# Patient Record
Sex: FEMALE | Race: WHITE | NOT HISPANIC OR LATINO | Employment: OTHER | URBAN - METROPOLITAN AREA
[De-identification: names, ages, dates, MRNs, and addresses within clinical notes are randomized per-mention and may not be internally consistent; named-entity substitution may affect disease eponyms.]

---

## 2017-03-29 DIAGNOSIS — E78.2 MIXED HYPERLIPIDEMIA: Primary | ICD-10-CM

## 2017-03-29 RX ORDER — ATORVASTATIN CALCIUM 20 MG/1
20 TABLET, FILM COATED ORAL DAILY
Qty: 90 TABLET | Refills: 0 | Status: SHIPPED | OUTPATIENT
Start: 2017-03-29 | End: 2017-08-04

## 2017-05-19 DIAGNOSIS — I25.10 CAD (CORONARY ARTERY DISEASE): Primary | ICD-10-CM

## 2017-05-19 NOTE — TELEPHONE ENCOUNTER
Per 5/25/16 chart note:  IMPRESSION AND PLAN:   1. Palpitations. She was experiencing palpitations likely related to PVCs as identified on ZIO Patch monitor; however, at this point, she is no longer experiencing any symptoms whatsoever. I suggested she continue with her metoprolol XL at 50 mg daily.   2. Very mild nonobstructive coronary disease as identified on recent angiogram. She has multiple risk factors including a history of dyslipidemia, hypertension, family history of heart disease, obesity, diabetes and a previous history of tobacco abuse. I suggested she continue her aspirin, beta blocker and statin therapy.   3. Hypertension. Blood pressure is adequately controlled.   4. Dyslipidemia, as noted above.       Ms. Gomez is going to visit with us in the future again as needed. She is aware if she has questions or concerns, she can contact our office.

## 2017-08-04 DIAGNOSIS — E78.2 MIXED HYPERLIPIDEMIA: ICD-10-CM

## 2017-08-04 RX ORDER — ATORVASTATIN CALCIUM 20 MG/1
20 TABLET, FILM COATED ORAL DAILY
Qty: 90 TABLET | Refills: 0 | Status: ON HOLD | OUTPATIENT
Start: 2017-08-04 | End: 2020-07-25

## 2017-11-01 DIAGNOSIS — E78.2 MIXED HYPERLIPIDEMIA: ICD-10-CM

## 2017-11-01 RX ORDER — ATORVASTATIN CALCIUM 20 MG/1
20 TABLET, FILM COATED ORAL DAILY
Qty: 90 TABLET | Refills: 0 | OUTPATIENT
Start: 2017-11-01

## 2019-08-29 ENCOUNTER — HOSPITAL ENCOUNTER (EMERGENCY)
Facility: CLINIC | Age: 54
Discharge: HOME | End: 2019-08-29
Attending: EMERGENCY MEDICINE | Admitting: EMERGENCY MEDICINE
Payer: COMMERCIAL

## 2019-08-29 VITALS
RESPIRATION RATE: 16 BRPM | DIASTOLIC BLOOD PRESSURE: 73 MMHG | SYSTOLIC BLOOD PRESSURE: 133 MMHG | OXYGEN SATURATION: 95 % | WEIGHT: 230 LBS | BODY MASS INDEX: 40.75 KG/M2 | TEMPERATURE: 98.4 F | HEIGHT: 63 IN

## 2019-08-29 DIAGNOSIS — K04.7 DENTAL ABSCESS: ICD-10-CM

## 2019-08-29 PROCEDURE — 99283 EMERGENCY DEPT VISIT LOW MDM: CPT | Mod: 25

## 2019-08-29 PROCEDURE — 10021 FNA BX W/O IMG GDN 1ST LES: CPT

## 2019-08-29 RX ORDER — CLINDAMYCIN HCL 300 MG
300 CAPSULE ORAL 3 TIMES DAILY
Qty: 30 CAPSULE | Refills: 0 | Status: SHIPPED | OUTPATIENT
Start: 2019-08-29 | End: 2019-08-29

## 2019-08-29 RX ORDER — CHLORHEXIDINE GLUCONATE ORAL RINSE 1.2 MG/ML
15 SOLUTION DENTAL 2 TIMES DAILY
Qty: 300 ML | Refills: 0 | Status: SHIPPED | OUTPATIENT
Start: 2019-08-29 | End: 2019-08-29

## 2019-08-29 RX ORDER — CLINDAMYCIN HCL 300 MG
300 CAPSULE ORAL 3 TIMES DAILY
Qty: 30 CAPSULE | Refills: 0 | Status: ON HOLD | OUTPATIENT
Start: 2019-08-29 | End: 2019-09-01

## 2019-08-29 RX ORDER — CHLORHEXIDINE GLUCONATE ORAL RINSE 1.2 MG/ML
15 SOLUTION DENTAL 2 TIMES DAILY
Qty: 300 ML | Refills: 0 | Status: ON HOLD | OUTPATIENT
Start: 2019-08-29 | End: 2020-07-25

## 2019-08-29 ASSESSMENT — ENCOUNTER SYMPTOMS
FEVER: 0
FACIAL SWELLING: 1
SHORTNESS OF BREATH: 0

## 2019-08-29 ASSESSMENT — MIFFLIN-ST. JEOR: SCORE: 1599.46

## 2019-08-29 NOTE — ED PROVIDER NOTES
History     Chief Complaint:  Facial swelling    HPI   Radha Gomez is a 54 year old female who presents with facial swelling. The patient noticed facial and neck swelling 3 days ago that has progressively worsened since. Today, she went to the dentist who noticed an abscess that required drainage. The patient states the abscess was not drained as the dentist was concerned the patients breathing would stop. She was referred to the ER. Here, the patient denies any shortness of breath or fever.     Allergies:  Abilify   Paxil   Contrast dye   Ditropan   Wellbutrin      Medications:    Xanax   Aspirin   Lipitor   Rexulti   Norco   Ibuprofen   Meclizine   Glucophage   Methadone   Toprol   Aygestin   Omeprazole   Pyridium   Seroquel   Senokot   Zoloft   Tizanidine   Effexor     Past Medical History:    Anxiety   Arthritis   Back injury   Bipolar 1 disorder   Borderline personality disorder   Chest pain   Coronary artery disease   Depression   Hyperlipidemia   Hypertension   IC   Osteoarthritis   Palpitations   Post traumatic stress disorder     Past Surgical History:    Abdomen surgery   Bladder biopsy  Colonoscopy   Esophagoscopy, gastroscopy, and duodenoscopy   GYN surgery   Left heart cath   Cholecystectomy   Laparoscopy   Orthopedic surgery    Family History:    Depression   Substance abuse   Dementia   mental illness   Coronary artery disease   Diabetes mellitus   Heart failure   Suicide   Bipolar disorder     Social History:  Marital Status:     Smoker:   Former, quit 2012   Smokeless:   Never   Alcohol:   No   Drugs:   No   Arrives with:        Review of Systems   Constitutional: Negative for fever.   HENT: Positive for facial swelling.    Respiratory: Negative for shortness of breath.    All other systems reviewed and are negative.    Physical Exam     Patient Vitals for the past 24 hrs:   BP Temp Temp src Heart Rate Resp SpO2 Height Weight   08/29/19 1023 133/73 98.4  F (36.9  C) Oral 76 16  "95 % 1.588 m (5' 2.5\") 104.3 kg (230 lb)     Physical Exam  Constitutional: The patient is oriented to person, place, and time.   HENT:   Head: Atraumatic  Right Ear: Normal  Left Ear: Normal  Nose: Nose normal.   Mouth/Throat: Oropharynx is clear and moist. No exudate. Swelling along the right side of the jaw. Gingival mucosa along the first premolar; erythematous and slightly swollen. No active drainage. No swelling in the floor of the mouth  Neck: Normal range of motion. Neck supple. Tender adenopathy on the right  Cardiovascular: Normal rate, regular rhythm, no murmur gallops or rubs. Intact distal pulses.    Pulmonary/Chest: CTA bilaterally. No wheezes rale or rhonchi. No stridor     Emergency Department Course     Procedures:      Aspiration     LOCATIONS:  Right side of jaw     ANESTHESIA:  Local field block using lidocaine, total of 1 mLs     PREPARATION:  Cleansed with Normal Saline and Shur Clens     PROCEDURE:   An 19-gauge needle was inserted into the area of flutuance.  Marked purulence, blood, and fluid was noted.  The wound was left open. Plan is for QID salt water rinses, follow up with dental/oral surgery.    PATIENT STATUS:        Patient tolerated the procedure well. There were no complications.    Emergency Department Course:  1022 I performed an exam of the patient as documented above.   1030 I anesthetized the patient's abscess.   1050 Abscess was drained.     Findings and plan explained. Patient discharged home with instructions regarding supportive care and reasons to return. The importance of close follow-up was reviewed. I personally answered all related questions prior to discharge.    Impression & Plan    Medical Decision Making:  Radha Gomez is a 54 years old female who presents with right sided facial swelling and tooth pain. She saw her dentist this morning who was concerned about a soft tissue abscess. On my evaluation, she has tenderness to palpation over the first premolar with " gingival inflammation and swelling. She also has darien cervical adenopathy. There are no signs of airway comprise or swelling. There is no swelling in the floor of the mouth. I did perform and aspiration at the base of the affected tooth and got a very small amount of purulent material but mostly blood. At this point, I feel she can be safely discharged to home. I placed her on clindamycin and Peridex oral rinses. She should follow up with her dentist next week for more definitive treatment and return for problems.     Diagnosis:    ICD-10-CM    1. Dental abscess K04.7      Disposition:  discharged to home with Clindamycin and Peridex.     Scribe Disclosure:  I, Angelito Mooney, am serving as a scribe on 8/29/2019 at 10:22 AM to personally document services performed by J Carlos Lane MD based on my observations and the provider's statements to me.     Angelito Mooney  8/29/2019    EMERGENCY DEPARTMENT       J Carlos Lane MD  08/29/19 [id number]

## 2019-08-29 NOTE — ED TRIAGE NOTES
Sent from dentist for eval of facial and neck swellings starting on Monday. Pt speaking in full sentences. Has pain, taking advil which helps

## 2019-08-29 NOTE — ED AVS SNAPSHOT
Emergency Department  [address] [facility] [address]  Phone:  [phone]  Fax:  [phone]                                    Radha Gomez   MRN: [id number]    Department:   Emergency Department   Date of Visit:  8/29/2019           After Visit Summary Signature Page    I have received my discharge instructions, and my questions have been answered. I have discussed any challenges I see with this plan with the nurse or doctor.    ..........................................................................................................................................  Patient/Patient Representative Signature      ..........................................................................................................................................  Patient Representative Print Name and Relationship to Patient    ..................................................               ................................................  Date                                   Time    ..........................................................................................................................................  Reviewed by Signature/Title    ...................................................              ..............................................  Date                                               Time          22EPIC Rev 08/18

## 2019-08-30 ENCOUNTER — APPOINTMENT (OUTPATIENT)
Dept: CT IMAGING | Facility: CLINIC | Age: 54
DRG: 138 | End: 2019-08-30
Payer: COMMERCIAL

## 2019-08-30 ENCOUNTER — HOSPITAL ENCOUNTER (INPATIENT)
Facility: CLINIC | Age: 54
LOS: 2 days | Discharge: HOME | DRG: 138 | End: 2019-09-01
Attending: EMERGENCY MEDICINE | Admitting: HOSPITALIST
Payer: COMMERCIAL

## 2019-08-30 ENCOUNTER — ANESTHESIA (OUTPATIENT)
Dept: SURGERY | Facility: CLINIC | Age: 54
DRG: 138 | End: 2019-08-30
Payer: COMMERCIAL

## 2019-08-30 ENCOUNTER — ANESTHESIA EVENT (OUTPATIENT)
Dept: SURGERY | Facility: CLINIC | Age: 54
DRG: 138 | End: 2019-08-30
Payer: COMMERCIAL

## 2019-08-30 DIAGNOSIS — Z78.9 FAILURE OF OUTPATIENT TREATMENT: ICD-10-CM

## 2019-08-30 DIAGNOSIS — K04.7 DENTAL ABSCESS: ICD-10-CM

## 2019-08-30 DIAGNOSIS — K04.7 TOOTH ABSCESS: Primary | ICD-10-CM

## 2019-08-30 DIAGNOSIS — K04.7 PERIAPICAL ABSCESS: ICD-10-CM

## 2019-08-30 DIAGNOSIS — L03.211 FACIAL CELLULITIS: ICD-10-CM

## 2019-08-30 LAB
ALBUMIN SERPL-MCNC: 3.2 G/DL (ref 3.4–5)
ALP SERPL-CCNC: 106 U/L (ref 40–150)
ALT SERPL W P-5'-P-CCNC: 24 U/L (ref 0–50)
ANION GAP SERPL CALCULATED.3IONS-SCNC: 6 MMOL/L (ref 3–14)
AST SERPL W P-5'-P-CCNC: 16 U/L (ref 0–45)
BASOPHILS # BLD AUTO: 0 10E9/L (ref 0–0.2)
BASOPHILS NFR BLD AUTO: 0.2 %
BILIRUB SERPL-MCNC: 0.2 MG/DL (ref 0.2–1.3)
BUN SERPL-MCNC: 17 MG/DL (ref 7–30)
CALCIUM SERPL-MCNC: 8.5 MG/DL (ref 8.5–10.1)
CHLORIDE SERPL-SCNC: 111 MMOL/L (ref 94–109)
CO2 SERPL-SCNC: 25 MMOL/L (ref 20–32)
CREAT SERPL-MCNC: 1.12 MG/DL (ref 0.52–1.04)
DIFFERENTIAL METHOD BLD: ABNORMAL
EOSINOPHIL # BLD AUTO: 0.3 10E9/L (ref 0–0.7)
EOSINOPHIL NFR BLD AUTO: 3.2 %
ERYTHROCYTE [DISTWIDTH] IN BLOOD BY AUTOMATED COUNT: 14 % (ref 10–15)
GFR SERPL CREATININE-BSD FRML MDRD: 55 ML/MIN/{1.73_M2}
GLUCOSE BLDC GLUCOMTR-MCNC: 103 MG/DL (ref 70–99)
GLUCOSE SERPL-MCNC: 123 MG/DL (ref 70–99)
GRAM STN SPEC: ABNORMAL
GRAM STN SPEC: ABNORMAL
HCT VFR BLD AUTO: 32.8 % (ref 35–47)
HGB BLD-MCNC: 11 G/DL (ref 11.7–15.7)
IMM GRANULOCYTES # BLD: 0 10E9/L (ref 0–0.4)
IMM GRANULOCYTES NFR BLD: 0.2 %
INR PPP: 0.91 (ref 0.86–1.14)
LYMPHOCYTES # BLD AUTO: 2.2 10E9/L (ref 0.8–5.3)
LYMPHOCYTES NFR BLD AUTO: 26.3 %
Lab: ABNORMAL
MCH RBC QN AUTO: 29.5 PG (ref 26.5–33)
MCHC RBC AUTO-ENTMCNC: 33.5 G/DL (ref 31.5–36.5)
MCV RBC AUTO: 88 FL (ref 78–100)
MONOCYTES # BLD AUTO: 1 10E9/L (ref 0–1.3)
MONOCYTES NFR BLD AUTO: 11.8 %
NEUTROPHILS # BLD AUTO: 4.8 10E9/L (ref 1.6–8.3)
NEUTROPHILS NFR BLD AUTO: 58.3 %
NRBC # BLD AUTO: 0 10*3/UL
NRBC BLD AUTO-RTO: 0 /100
PLATELET # BLD AUTO: 256 10E9/L (ref 150–450)
POTASSIUM SERPL-SCNC: 4.5 MMOL/L (ref 3.4–5.3)
PROT SERPL-MCNC: 6.9 G/DL (ref 6.8–8.8)
RBC # BLD AUTO: 3.73 10E12/L (ref 3.8–5.2)
SODIUM SERPL-SCNC: 142 MMOL/L (ref 133–144)
SPECIMEN SOURCE: ABNORMAL
WBC # BLD AUTO: 8.2 10E9/L (ref 4–11)

## 2019-08-30 PROCEDURE — 36000058 ZZH SURGERY LEVEL 3 EA 15 ADDTL MIN: Performed by: DENTIST

## 2019-08-30 PROCEDURE — 25000128 H RX IP 250 OP 636: Performed by: NURSE ANESTHETIST, CERTIFIED REGISTERED

## 2019-08-30 PROCEDURE — 25000132 ZZH RX MED GY IP 250 OP 250 PS 637: Performed by: DENTIST

## 2019-08-30 PROCEDURE — 87106 FUNGI IDENTIFICATION YEAST: CPT | Performed by: DENTIST

## 2019-08-30 PROCEDURE — 87076 CULTURE ANAEROBE IDENT EACH: CPT | Performed by: DENTIST

## 2019-08-30 PROCEDURE — 85025 COMPLETE CBC W/AUTO DIFF WBC: CPT | Performed by: PHYSICIAN ASSISTANT

## 2019-08-30 PROCEDURE — 96365 THER/PROPH/DIAG IV INF INIT: CPT

## 2019-08-30 PROCEDURE — 70490 CT SOFT TISSUE NECK W/O DYE: CPT

## 2019-08-30 PROCEDURE — 0W930ZZ DRAINAGE OF ORAL CAVITY AND THROAT, OPEN APPROACH: ICD-10-PCS | Performed by: DENTIST

## 2019-08-30 PROCEDURE — 87070 CULTURE OTHR SPECIMN AEROBIC: CPT | Performed by: DENTIST

## 2019-08-30 PROCEDURE — 87075 CULTR BACTERIA EXCEPT BLOOD: CPT | Performed by: DENTIST

## 2019-08-30 PROCEDURE — 87205 SMEAR GRAM STAIN: CPT | Performed by: DENTIST

## 2019-08-30 PROCEDURE — 80053 COMPREHEN METABOLIC PANEL: CPT | Performed by: PHYSICIAN ASSISTANT

## 2019-08-30 PROCEDURE — 25000128 H RX IP 250 OP 636: Performed by: DENTIST

## 2019-08-30 PROCEDURE — 12000000 ZZH R&B MED SURG/OB

## 2019-08-30 PROCEDURE — 0CDXXZ1 EXTRACTION OF LOWER TOOTH, MULTIPLE, EXTERNAL APPROACH: ICD-10-PCS | Performed by: DENTIST

## 2019-08-30 PROCEDURE — 36000056 ZZH SURGERY LEVEL 3 1ST 30 MIN: Performed by: DENTIST

## 2019-08-30 PROCEDURE — 25000125 ZZHC RX 250: Performed by: DENTIST

## 2019-08-30 PROCEDURE — 25000566 ZZH SEVOFLURANE, EA 15 MIN: Performed by: DENTIST

## 2019-08-30 PROCEDURE — 87186 SC STD MICRODIL/AGAR DIL: CPT | Performed by: DENTIST

## 2019-08-30 PROCEDURE — 37000008 ZZH ANESTHESIA TECHNICAL FEE, 1ST 30 MIN: Performed by: DENTIST

## 2019-08-30 PROCEDURE — 87077 CULTURE AEROBIC IDENTIFY: CPT | Performed by: DENTIST

## 2019-08-30 PROCEDURE — 85610 PROTHROMBIN TIME: CPT | Performed by: PHYSICIAN ASSISTANT

## 2019-08-30 PROCEDURE — 25000128 H RX IP 250 OP 636: Performed by: PHYSICIAN ASSISTANT

## 2019-08-30 PROCEDURE — 40000169 ZZH STATISTIC PRE-PROCEDURE ASSESSMENT I: Performed by: DENTIST

## 2019-08-30 PROCEDURE — 25000128 H RX IP 250 OP 636: Performed by: ANESTHESIOLOGY

## 2019-08-30 PROCEDURE — 37000009 ZZH ANESTHESIA TECHNICAL FEE, EACH ADDTL 15 MIN: Performed by: DENTIST

## 2019-08-30 PROCEDURE — 71000012 ZZH RECOVERY PHASE 1 LEVEL 1 FIRST HR: Performed by: DENTIST

## 2019-08-30 PROCEDURE — 25800030 ZZH RX IP 258 OP 636: Performed by: NURSE ANESTHETIST, CERTIFIED REGISTERED

## 2019-08-30 PROCEDURE — 25000132 ZZH RX MED GY IP 250 OP 250 PS 637: Performed by: PHYSICIAN ASSISTANT

## 2019-08-30 PROCEDURE — 96366 THER/PROPH/DIAG IV INF ADDON: CPT

## 2019-08-30 PROCEDURE — 00000146 ZZHCL STATISTIC GLUCOSE BY METER IP

## 2019-08-30 PROCEDURE — 27210794 ZZH OR GENERAL SUPPLY STERILE: Performed by: DENTIST

## 2019-08-30 PROCEDURE — 99223 1ST HOSP IP/OBS HIGH 75: CPT | Mod: AI | Performed by: HOSPITALIST

## 2019-08-30 PROCEDURE — 99285 EMERGENCY DEPT VISIT HI MDM: CPT | Mod: 25

## 2019-08-30 PROCEDURE — 25000125 ZZHC RX 250: Performed by: NURSE ANESTHETIST, CERTIFIED REGISTERED

## 2019-08-30 PROCEDURE — 25800030 ZZH RX IP 258 OP 636: Performed by: DENTIST

## 2019-08-30 PROCEDURE — 71000013 ZZH RECOVERY PHASE 1 LEVEL 1 EA ADDTL HR: Performed by: DENTIST

## 2019-08-30 RX ORDER — LIDOCAINE HYDROCHLORIDE 20 MG/ML
INJECTION, SOLUTION INFILTRATION; PERINEURAL PRN
Status: DISCONTINUED | OUTPATIENT
Start: 2019-08-30 | End: 2019-08-30

## 2019-08-30 RX ORDER — POLYETHYLENE GLYCOL 3350 17 G/17G
17 POWDER, FOR SOLUTION ORAL DAILY PRN
Status: DISCONTINUED | OUTPATIENT
Start: 2019-08-30 | End: 2019-09-01 | Stop reason: HOSPADM

## 2019-08-30 RX ORDER — HYDROMORPHONE HYDROCHLORIDE 1 MG/ML
.3-.5 INJECTION, SOLUTION INTRAMUSCULAR; INTRAVENOUS; SUBCUTANEOUS EVERY 5 MIN PRN
Status: DISCONTINUED | OUTPATIENT
Start: 2019-08-30 | End: 2019-08-30 | Stop reason: HOSPADM

## 2019-08-30 RX ORDER — POTASSIUM CHLORIDE 29.8 MG/ML
20 INJECTION INTRAVENOUS
Status: DISCONTINUED | OUTPATIENT
Start: 2019-08-30 | End: 2019-09-01 | Stop reason: HOSPADM

## 2019-08-30 RX ORDER — SODIUM CHLORIDE, SODIUM LACTATE, POTASSIUM CHLORIDE, CALCIUM CHLORIDE 600; 310; 30; 20 MG/100ML; MG/100ML; MG/100ML; MG/100ML
INJECTION, SOLUTION INTRAVENOUS CONTINUOUS
Status: DISCONTINUED | OUTPATIENT
Start: 2019-08-30 | End: 2019-08-30 | Stop reason: HOSPADM

## 2019-08-30 RX ORDER — AMOXICILLIN 250 MG
2 CAPSULE ORAL 2 TIMES DAILY PRN
Status: DISCONTINUED | OUTPATIENT
Start: 2019-08-30 | End: 2019-09-01 | Stop reason: HOSPADM

## 2019-08-30 RX ORDER — AMPICILLIN AND SULBACTAM 2; 1 G/1; G/1
3 INJECTION, POWDER, FOR SOLUTION INTRAMUSCULAR; INTRAVENOUS ONCE
Status: COMPLETED | OUTPATIENT
Start: 2019-08-30 | End: 2019-08-30

## 2019-08-30 RX ORDER — LURASIDONE HYDROCHLORIDE 60 MG/1
60 TABLET, FILM COATED ORAL EVERY EVENING
Status: ON HOLD | COMMUNITY
End: 2020-07-29

## 2019-08-30 RX ORDER — CHLORHEXIDINE GLUCONATE ORAL RINSE 1.2 MG/ML
10 SOLUTION DENTAL ONCE
Status: COMPLETED | OUTPATIENT
Start: 2019-08-30 | End: 2019-08-30

## 2019-08-30 RX ORDER — ACETAMINOPHEN 650 MG/1
650 SUPPOSITORY RECTAL EVERY 4 HOURS PRN
Status: DISCONTINUED | OUTPATIENT
Start: 2019-08-30 | End: 2019-09-01 | Stop reason: HOSPADM

## 2019-08-30 RX ORDER — LABETALOL HYDROCHLORIDE 5 MG/ML
10 INJECTION, SOLUTION INTRAVENOUS
Status: DISCONTINUED | OUTPATIENT
Start: 2019-08-30 | End: 2019-09-01 | Stop reason: HOSPADM

## 2019-08-30 RX ORDER — PROPOFOL 10 MG/ML
INJECTION, EMULSION INTRAVENOUS PRN
Status: DISCONTINUED | OUTPATIENT
Start: 2019-08-30 | End: 2019-08-30

## 2019-08-30 RX ORDER — AMOXICILLIN 250 MG
1 CAPSULE ORAL 2 TIMES DAILY PRN
Status: DISCONTINUED | OUTPATIENT
Start: 2019-08-30 | End: 2019-09-01 | Stop reason: HOSPADM

## 2019-08-30 RX ORDER — SODIUM CHLORIDE, SODIUM LACTATE, POTASSIUM CHLORIDE, CALCIUM CHLORIDE 600; 310; 30; 20 MG/100ML; MG/100ML; MG/100ML; MG/100ML
INJECTION, SOLUTION INTRAVENOUS CONTINUOUS PRN
Status: DISCONTINUED | OUTPATIENT
Start: 2019-08-30 | End: 2019-08-30

## 2019-08-30 RX ORDER — NALOXONE HYDROCHLORIDE 0.4 MG/ML
.1-.4 INJECTION, SOLUTION INTRAMUSCULAR; INTRAVENOUS; SUBCUTANEOUS
Status: DISCONTINUED | OUTPATIENT
Start: 2019-08-30 | End: 2019-09-01 | Stop reason: HOSPADM

## 2019-08-30 RX ORDER — PROPRANOLOL HYDROCHLORIDE 80 MG/1
80 CAPSULE, EXTENDED RELEASE ORAL DAILY
COMMUNITY
End: 2020-10-16

## 2019-08-30 RX ORDER — VENLAFAXINE HYDROCHLORIDE 150 MG/1
300 CAPSULE, EXTENDED RELEASE ORAL DAILY
COMMUNITY

## 2019-08-30 RX ORDER — BISACODYL 10 MG
10 SUPPOSITORY, RECTAL RECTAL DAILY PRN
Status: DISCONTINUED | OUTPATIENT
Start: 2019-08-30 | End: 2019-09-01 | Stop reason: HOSPADM

## 2019-08-30 RX ORDER — NALOXONE HYDROCHLORIDE 0.4 MG/ML
.1-.4 INJECTION, SOLUTION INTRAMUSCULAR; INTRAVENOUS; SUBCUTANEOUS
Status: DISCONTINUED | OUTPATIENT
Start: 2019-08-30 | End: 2019-08-31

## 2019-08-30 RX ORDER — PRAZOSIN HYDROCHLORIDE 5 MG/1
10 CAPSULE ORAL AT BEDTIME
COMMUNITY
End: 2020-11-23 | Stop reason: SINTOL

## 2019-08-30 RX ORDER — LIDOCAINE HYDROCHLORIDE AND EPINEPHRINE BITARTRATE 20; .01 MG/ML; MG/ML
INJECTION, SOLUTION SUBCUTANEOUS PRN
Status: DISCONTINUED | OUTPATIENT
Start: 2019-08-30 | End: 2019-08-30 | Stop reason: HOSPADM

## 2019-08-30 RX ORDER — ONDANSETRON 4 MG/1
4 TABLET, ORALLY DISINTEGRATING ORAL EVERY 30 MIN PRN
Status: DISCONTINUED | OUTPATIENT
Start: 2019-08-30 | End: 2019-08-30 | Stop reason: HOSPADM

## 2019-08-30 RX ORDER — POTASSIUM CHLORIDE 1.5 G/1.58G
20-40 POWDER, FOR SOLUTION ORAL
Status: DISCONTINUED | OUTPATIENT
Start: 2019-08-30 | End: 2019-09-01 | Stop reason: HOSPADM

## 2019-08-30 RX ORDER — POTASSIUM CHLORIDE 1500 MG/1
20-40 TABLET, EXTENDED RELEASE ORAL
Status: DISCONTINUED | OUTPATIENT
Start: 2019-08-30 | End: 2019-09-01 | Stop reason: HOSPADM

## 2019-08-30 RX ORDER — MAGNESIUM HYDROXIDE 1200 MG/15ML
LIQUID ORAL PRN
Status: DISCONTINUED | OUTPATIENT
Start: 2019-08-30 | End: 2019-08-30 | Stop reason: HOSPADM

## 2019-08-30 RX ORDER — FENTANYL CITRATE 50 UG/ML
INJECTION, SOLUTION INTRAMUSCULAR; INTRAVENOUS PRN
Status: DISCONTINUED | OUTPATIENT
Start: 2019-08-30 | End: 2019-08-30

## 2019-08-30 RX ORDER — HYDROMORPHONE HYDROCHLORIDE 1 MG/ML
.3-.5 INJECTION, SOLUTION INTRAMUSCULAR; INTRAVENOUS; SUBCUTANEOUS
Status: DISCONTINUED | OUTPATIENT
Start: 2019-08-30 | End: 2019-09-01 | Stop reason: HOSPADM

## 2019-08-30 RX ORDER — MAGNESIUM SULFATE HEPTAHYDRATE 40 MG/ML
4 INJECTION, SOLUTION INTRAVENOUS EVERY 4 HOURS PRN
Status: DISCONTINUED | OUTPATIENT
Start: 2019-08-30 | End: 2019-09-01 | Stop reason: HOSPADM

## 2019-08-30 RX ORDER — SODIUM CHLORIDE 9 MG/ML
INJECTION, SOLUTION INTRAVENOUS CONTINUOUS
Status: DISCONTINUED | OUTPATIENT
Start: 2019-08-30 | End: 2019-09-01 | Stop reason: HOSPADM

## 2019-08-30 RX ORDER — OXYCODONE AND ACETAMINOPHEN 5; 325 MG/1; MG/1
1 TABLET ORAL ONCE
Status: COMPLETED | OUTPATIENT
Start: 2019-08-30 | End: 2019-08-30

## 2019-08-30 RX ORDER — LIDOCAINE 40 MG/G
CREAM TOPICAL
Status: DISCONTINUED | OUTPATIENT
Start: 2019-08-30 | End: 2019-09-01 | Stop reason: HOSPADM

## 2019-08-30 RX ORDER — BUSPIRONE HYDROCHLORIDE 30 MG/1
TABLET ORAL 2 TIMES DAILY
COMMUNITY

## 2019-08-30 RX ORDER — AMPICILLIN AND SULBACTAM 2; 1 G/1; G/1
3 INJECTION, POWDER, FOR SOLUTION INTRAMUSCULAR; INTRAVENOUS EVERY 6 HOURS
Status: DISCONTINUED | OUTPATIENT
Start: 2019-08-30 | End: 2019-09-01 | Stop reason: HOSPADM

## 2019-08-30 RX ORDER — ACETAMINOPHEN 325 MG/1
650 TABLET ORAL EVERY 4 HOURS PRN
Status: DISCONTINUED | OUTPATIENT
Start: 2019-08-30 | End: 2019-09-01 | Stop reason: HOSPADM

## 2019-08-30 RX ORDER — ONDANSETRON 2 MG/ML
INJECTION INTRAMUSCULAR; INTRAVENOUS PRN
Status: DISCONTINUED | OUTPATIENT
Start: 2019-08-30 | End: 2019-08-30

## 2019-08-30 RX ORDER — FENTANYL CITRATE 50 UG/ML
25-100 INJECTION, SOLUTION INTRAMUSCULAR; INTRAVENOUS
Status: DISCONTINUED | OUTPATIENT
Start: 2019-08-30 | End: 2019-08-30 | Stop reason: HOSPADM

## 2019-08-30 RX ORDER — ONDANSETRON 4 MG/1
4 TABLET, ORALLY DISINTEGRATING ORAL EVERY 6 HOURS PRN
Status: DISCONTINUED | OUTPATIENT
Start: 2019-08-30 | End: 2019-09-01 | Stop reason: HOSPADM

## 2019-08-30 RX ORDER — POTASSIUM CL/LIDO/0.9 % NACL 10MEQ/0.1L
10 INTRAVENOUS SOLUTION, PIGGYBACK (ML) INTRAVENOUS
Status: DISCONTINUED | OUTPATIENT
Start: 2019-08-30 | End: 2019-09-01 | Stop reason: HOSPADM

## 2019-08-30 RX ORDER — ONDANSETRON 2 MG/ML
4 INJECTION INTRAMUSCULAR; INTRAVENOUS EVERY 30 MIN PRN
Status: DISCONTINUED | OUTPATIENT
Start: 2019-08-30 | End: 2019-08-30 | Stop reason: HOSPADM

## 2019-08-30 RX ORDER — CHLORHEXIDINE GLUCONATE ORAL RINSE 1.2 MG/ML
15 SOLUTION DENTAL 2 TIMES DAILY
Status: DISCONTINUED | OUTPATIENT
Start: 2019-08-31 | End: 2019-09-01 | Stop reason: HOSPADM

## 2019-08-30 RX ORDER — ONDANSETRON 2 MG/ML
4 INJECTION INTRAMUSCULAR; INTRAVENOUS EVERY 6 HOURS PRN
Status: DISCONTINUED | OUTPATIENT
Start: 2019-08-30 | End: 2019-09-01 | Stop reason: HOSPADM

## 2019-08-30 RX ORDER — FENTANYL CITRATE 50 UG/ML
25-50 INJECTION, SOLUTION INTRAMUSCULAR; INTRAVENOUS
Status: DISCONTINUED | OUTPATIENT
Start: 2019-08-30 | End: 2019-08-30 | Stop reason: HOSPADM

## 2019-08-30 RX ORDER — GLYCOPYRROLATE 2 MG/1
4 TABLET ORAL EVERY MORNING
COMMUNITY
End: 2020-07-31

## 2019-08-30 RX ORDER — POTASSIUM CHLORIDE 7.45 MG/ML
10 INJECTION INTRAVENOUS
Status: DISCONTINUED | OUTPATIENT
Start: 2019-08-30 | End: 2019-09-01 | Stop reason: HOSPADM

## 2019-08-30 RX ADMIN — FENTANYL CITRATE 50 MCG: 50 INJECTION, SOLUTION INTRAMUSCULAR; INTRAVENOUS at 20:58

## 2019-08-30 RX ADMIN — FENTANYL CITRATE 50 MCG: 50 INJECTION INTRAMUSCULAR; INTRAVENOUS at 22:43

## 2019-08-30 RX ADMIN — SODIUM CHLORIDE, POTASSIUM CHLORIDE, SODIUM LACTATE AND CALCIUM CHLORIDE: 600; 310; 30; 20 INJECTION, SOLUTION INTRAVENOUS at 20:46

## 2019-08-30 RX ADMIN — MIDAZOLAM 2 MG: 1 INJECTION INTRAMUSCULAR; INTRAVENOUS at 20:46

## 2019-08-30 RX ADMIN — FENTANYL CITRATE 50 MCG: 50 INJECTION, SOLUTION INTRAMUSCULAR; INTRAVENOUS at 21:10

## 2019-08-30 RX ADMIN — CHLORHEXIDINE GLUCONATE 10 ML: 1.2 RINSE ORAL at 20:41

## 2019-08-30 RX ADMIN — HYDROMORPHONE HYDROCHLORIDE 0.5 MG: 1 INJECTION, SOLUTION INTRAMUSCULAR; INTRAVENOUS; SUBCUTANEOUS at 22:51

## 2019-08-30 RX ADMIN — SUCCINYLCHOLINE CHLORIDE 100 MG: 20 INJECTION, SOLUTION INTRAMUSCULAR; INTRAVENOUS; PARENTERAL at 20:50

## 2019-08-30 RX ADMIN — HYDROMORPHONE HYDROCHLORIDE 0.5 MG: 1 INJECTION, SOLUTION INTRAMUSCULAR; INTRAVENOUS; SUBCUTANEOUS at 22:15

## 2019-08-30 RX ADMIN — SODIUM CHLORIDE: 9 INJECTION, SOLUTION INTRAVENOUS at 23:52

## 2019-08-30 RX ADMIN — FENTANYL CITRATE 50 MCG: 50 INJECTION, SOLUTION INTRAMUSCULAR; INTRAVENOUS at 20:50

## 2019-08-30 RX ADMIN — OXYCODONE HYDROCHLORIDE AND ACETAMINOPHEN 1 TABLET: 5; 325 TABLET ORAL at 16:49

## 2019-08-30 RX ADMIN — AMPICILLIN AND SULBACTAM 3 G: 1; 2 INJECTION, POWDER, FOR SOLUTION INTRAMUSCULAR; INTRAVENOUS at 16:50

## 2019-08-30 RX ADMIN — ONDANSETRON 4 MG: 2 INJECTION INTRAMUSCULAR; INTRAVENOUS at 21:15

## 2019-08-30 RX ADMIN — PROPOFOL 200 MG: 10 INJECTION, EMULSION INTRAVENOUS at 20:50

## 2019-08-30 RX ADMIN — FENTANYL CITRATE 50 MCG: 50 INJECTION INTRAMUSCULAR; INTRAVENOUS at 21:54

## 2019-08-30 RX ADMIN — FENTANYL CITRATE 50 MCG: 50 INJECTION, SOLUTION INTRAMUSCULAR; INTRAVENOUS at 21:35

## 2019-08-30 RX ADMIN — FENTANYL CITRATE 50 MCG: 50 INJECTION INTRAMUSCULAR; INTRAVENOUS at 22:29

## 2019-08-30 RX ADMIN — SODIUM CHLORIDE 1000 ML: 9 INJECTION, SOLUTION INTRAVENOUS at 16:40

## 2019-08-30 RX ADMIN — LIDOCAINE HYDROCHLORIDE 60 MG: 20 INJECTION, SOLUTION INFILTRATION; PERINEURAL at 20:50

## 2019-08-30 RX ADMIN — FENTANYL CITRATE 50 MCG: 50 INJECTION INTRAMUSCULAR; INTRAVENOUS at 22:02

## 2019-08-30 RX ADMIN — HYDROMORPHONE HYDROCHLORIDE 0.5 MG: 1 INJECTION, SOLUTION INTRAMUSCULAR; INTRAVENOUS; SUBCUTANEOUS at 23:54

## 2019-08-30 ASSESSMENT — ACTIVITIES OF DAILY LIVING (ADL)
TOILETING: 0-->INDEPENDENT
RETIRED_EATING: 0-->INDEPENDENT
TRANSFERRING: 0-->INDEPENDENT
BATHING: 0-->INDEPENDENT
FALL_HISTORY_WITHIN_LAST_SIX_MONTHS: NO
ADLS_ACUITY_SCORE: 13
COGNITION: 0 - NO COGNITION ISSUES REPORTED
RETIRED_COMMUNICATION: 0-->UNDERSTANDS/COMMUNICATES WITHOUT DIFFICULTY
DRESS: 0-->INDEPENDENT
AMBULATION: 0-->INDEPENDENT
SWALLOWING: 0-->SWALLOWS FOODS/LIQUIDS WITHOUT DIFFICULTY

## 2019-08-30 ASSESSMENT — ENCOUNTER SYMPTOMS
SHORTNESS OF BREATH: 0
FACIAL SWELLING: 1

## 2019-08-30 NOTE — PROGRESS NOTES
RECEIVING UNIT ED HANDOFF REVIEW    ED Nurse Handoff Report was reviewed by: Sierra Price RN on August 30, 2019 at 6:32 PM

## 2019-08-30 NOTE — ED NOTES
"[facility]  ED Nurse Handoff Report    ED Chief complaint: Oral Swelling (was here yesterday for mouthswelling and is not better)      ED Diagnosis:   Final diagnoses:   Dental abscess   Facial cellulitis   Failure of outpatient treatment       Code Status: Full Code    Allergies:   Allergies   Allergen Reactions     Abilify [Aripiprazole] Cramps     Total body- couldn't walk     Paxil [Paroxetine] Other (See Comments)     Total body pain     Contrast Dye Swelling     Ditropan [Oxybutynin Chloride]      Can't Urinate, Per Pt.     Wellbutrin [Bupropion] Palpitations     If not XL       Activity level - Baseline/Home:  Independent  Activity Level - Current:   Independent    Patient's Preferred language: English   Needed?: No    Isolation: No  Infection: Not Applicable  Bariatric?: No    Vital Signs:   Vitals:    08/30/19 1607   BP: (!) 176/76   Pulse: 67   Resp: 16   Temp: 97.7  F (36.5  C)   TempSrc: Temporal   SpO2: 97%       Cardiac Rhythm: ,        Pain level: 0-10 Pain Scale: 9    Is this patient confused?: No   Does this patient have a guardian?  No         If yes, is there guardianship documents in the Epic \"Code/ACP\" activity?  N/A         Guardian Notified?  N/A  [place] - Suicide Severity Rating Scale Completed?  Yes  If yes, what color did the patient score?  White    Patient Report: Initial Complaint: oral swelling  Focused Assessment: Patient has been on oral antibiotics for right dental abscess but failing outpatient treatment. Presents to ED with worsening pain and facial swelling.   Tests Performed: labs, CT  Abnormal Results: pending  Treatments provided: NS bolus, Unasyn IV, Percocet    Family Comments:  at bedside    OBS brochure/video discussed/provided to patient/family: N/A              Name of person given brochure if not patient: na              Relationship to patient: na    ED Medications:   Medications   0.9% sodium chloride BOLUS (1,000 mLs Intravenous " New Bag 8/30/19 1640)   ampicillin-sulbactam (UNASYN) 3 g vial to attach to  mL bag (3 g Intravenous New Bag 8/30/19 1650)   oxyCODONE-acetaminophen (PERCOCET) 5-325 MG per tablet 1 tablet (1 tablet Oral Given 8/30/19 1649)       Drips infusing?:  No    For the majority of the shift this patient was Green.   Interventions performed were none.    Severe Sepsis OR Septic Shock Diagnosis Present: No    To be done/followed up on inpatient unit:  close monitor    ED NURSE PHONE NUMBER: *[phone]

## 2019-08-30 NOTE — ED PROVIDER NOTES
History     Chief Complaint:  Oral Swelling    HPI   Radah Gomez is a 54 year old female who presents to the emergency department for evaluation of oral swelling. Of note, the patient was seen in the ED yesterday for oral swelling, diagnosed with dental abscess, and had an unsuccessful aspiration performed. The patient reports she first noted pain on 8/26 and swelling on 8/27. She indicates, since discharge yesterday, she has had increased swelling and pain, despite the aspiration performed; she was seen at the dentist yesterday prior to coming to the ED and was instructed to present to the ED. She states she has been taking antibiotics (Clindamycin and Peridex) as instructed; she has taken 5 doses of clindamycin total. She denies any difficulty opening her jaw or breathing. The patient notes she takes Norco at home without improvement to this pain.    Allergies:  Abilify [Aripiprazole]  Paxil [Paroxetine]  Contrast Dye  Ditropan [Oxybutynin Chloride]  Wellbutrin [Bupropion]     Medications:    Xanax  Aspirin  Lipitor  Latuda  Meclizine  Metformin  Methadone  Propranolol  Omeprazole  Effexor  Tizanidine  Zoloft  Senna  Norco  Seroquel  Pyridium  Clindamycin  Peridex     Past Medical History:    Anxiety  Arthritis  Bipolar 1 disorder  Borderline personality disorder  CAD  Depressive disorder  HLD  HTN  Interstitial cystitis  Osteoarthritis  PTSD    Past Surgical History:    Abdomen surgery  Bladder biopsy  EGD combined  Veneral warts removed  Left heart catheterization   Cholecystectomy  Laparoscopy  Orthopedic surgery    Family History:    Depression  Anxiety  Substance abuse  Dementia  Mental illness  CAD  Diabetes  Heart failure  Suicide    Social History:  Presents with .  Former smoker, 35 pack years, quit 2012.  Negative for alcohol use.  Marital Status:   [2]     Review of Systems   HENT: Positive for facial swelling.    Respiratory: Negative for shortness of breath.    All other systems  reviewed and are negative.    Physical Exam     Patient Vitals for the past 24 hrs:   BP Temp Temp src Pulse Resp SpO2   08/30/19 1607 (!) 176/76 97.7  F (36.5  C) Temporal 67 16 97 %     Physical Exam  General: Alert and interactive. Appears well. Cooperative and pleasant.   Eyes: The pupils are equal and round. EOMs intact. No scleral icterus.  ENT: Poor dentition. There is dental decay at teeth 30 with soft tissue swelling associated. No palpable abscess along gumline and no active drainage. No swelling or tenderness to the floor of the mouth. Tenderness over these teeth and associated gingiva.   Neck: Trachea is in the midline. Erythema and swelling extending into right side of neck, along anterior aspect of neck. No trismus. Protecting airway.   CV: Regular rate and rhythm. S1 and S2 normal without murmur, click, gallop or rub.   Resp: Breath sounds are clear bilaterally, without rhonchi, wheezes, rales. Non-labored, no retractions or accessory muscle use.     GI: Abdomen is soft without distension. No tenderness to palpation. No peritoneal signs.    MS: Moving all extremities well. Good muscle tone.   Skin: Warm and dry. No rash or lesions noted.  Neuro: Alert and oriented x 3. No focal neurologic deficits. Good strength and sensation in upper and lower extremities.   Psych: Awake. Alert.  Normal affect. Appropriate interactions.  Lymph: No anterior or posterior cervical lymphadenopathy noted.    Emergency Department Course     Imaging:  Radiographic findings were communicated with the patient who voiced understanding of the findings.    CT Soft Tissue Neck w/o Contrast:  1. Tooth #30 periapical abscess with overlying inflammatory change  extending into the right neck, consistent with cellulitis. No evidence  of drainable soft tissue abscess.  2. Tooth #14 periapical abscess.  3. Right level Ib lymphadenopathy, likely reactive.  As per radiology.    Laboratory:  CBC: WBC: 8.2, HGB: 11.0 (L), PLT: 256  CMP:  Glucose 123 (H), Chloride 111 (H), Creatinine 1.12 (H), GFR Estimate 55 (L), Albumin 3.2 (L), o/w WNL     Interventions:  1640 NS 1L IV Bolus  1649 Percocet 5-325 mg per tablet, 1 tablet PO  1650 Unasyn 3 g IV    Emergency Department Course:  Nursing notes and vitals reviewed. 1622 I performed an exam of the patient as documented above.     IV inserted. Medicine administered as documented above. Blood drawn. This was sent to the lab for further testing, results above.    1640 I shared service of the patient with Dr. Smith.     1656 I spoke with Dr. Welch, neuro radiology, regarding the patient.    1745 I rechecked the patient and discussed the results of her workup thus far.     1801 I spoke with Dr. Pérez, maxillofacial surgery, regarding the patient.    1810 I spoke with Dr. Dela Cruz, hospitalist, who agreed to admit the patient.    Findings and plan explained to the Patient who consents to admission. Discussed the patient with Dr. Dela Cruz, who will admit the patient to a medical bed for further monitoring, evaluation, and treatment.    I personally reviewed the laboratory results with the Patient and answered all related questions prior to admission.    Impression & Plan      Medical Decision Making:  Radha Gomez is a 54 year old female with a complicated past medical history including anxiety, hyperlipidemia, hypertension, and bipolar disorder, who presents for evaluation of dental swelling, now extending into neck. She was seen yesterday for the same, started on Clindmycin after an unsuccessful incision and drainage. She took five doses and continue to have swelling, now worsening into her neck. CT scan of the neck was obtained in the emergency department which shows signs of the periapical abscess with soft tissue swelling and inflammation surrounding tooth 30.  Discussed case with oral and maxillofacial surgeon Rachid Pérez, who plans to take the patient to the operating room tonight. Given that she  has failed outpatient antibiotic treatment, she will be admitted to the hospitalist service. I discussed the care of the patient with Dr. Dela Cruz, who will admit to an inpatient medical bed for further evaluation and treatment. She was given her first dose of IV Unasyn here as well as pain management with 1 Percocet.  Her labs here are within the normal range without evidence of systemic response or sepsis. Consideration toward Tate's angina or airway compromise, although she has no difficulty breathing and limited trismus at this point. Likely, when she gets this tooth out her swelling will markedly increase. Patient is stable for transfer to the floor at this point.     Diagnosis:    ICD-10-CM    1. Dental abscess K04.7 Comprehensive metabolic panel     INR     INR     CANCELED: INR   2. Facial cellulitis L03.211    3. Failure of outpatient treatment Z78.9    4. Periapical abscess K04.7     #30       Disposition:  Admitted to Dr. Dela Cruz.    IAlejandro, am serving as a scribe on 8/30/2019 at 4:13 PM to personally document services performed by Florencia Luis PA-C, based on my observations and the provider's statements to me.     Alejandro Bear  8/30/2019    EMERGENCY DEPARTMENT       Florencia Luis PA-C  08/30/19 1921

## 2019-08-30 NOTE — PHARMACY-ADMISSION MEDICATION HISTORY
Admission medication history interview status for the 8/30/2019  admission is complete. See EPIC admission navigator for prior to admission medications     Medication history source reliability:Moderate    Actions taken by pharmacist (provider contacted, etc): spoke with pt and called [facility] [place] to confirm several meds     Additional medication history information not noted on PTA med list :None    Medication reconciliation/reorder completed by provider prior to medication history? No    Time spent in this activity: 25 minutes    Prior to Admission medications    Medication Sig Last Dose Taking? Auth Provider   ALPRAZolam (XANAX PO) Take 0.5 mg by mouth daily as needed for anxiety  prn med Yes Reported, Patient   aspirin 81 MG EC tablet Take 1 tablet (81 mg) by mouth daily 8/29/2019 at Unknown time Yes MarchDolores MD   atorvastatin (LIPITOR) 20 MG tablet Take 1 tablet (20 mg) by mouth daily 8/29/2019 at Unknown time Yes March, Dolores Valles MD   busPIRone HCl (BUSPAR) 30 MG tablet Take 30 mg by mouth 2 times daily 8/29/2019 at Unknown time Yes Unknown, Entered By History   chlorhexidine (PERIDEX) 0.12 % solution Swish and spit 15 mLs in mouth 2 times daily 8/30/2019 at Unknown time Yes J Carlos Lane MD   clindamycin (CLEOCIN) 300 MG capsule Take 1 capsule (300 mg) by mouth 3 times daily 8/30/2019 at x2 Yes J Carlos Lane MD   dulaglutide (TRULICITY) 0.75 MG/0.5ML pen Inject 0.75 mg Subcutaneous every 7 days 8/21/2019 Yes Unknown, Entered By History   glycopyrrolate (GLYCATE) 2 MG tablet Take 2 mg by mouth 3 times daily not started Yes Unknown, Entered By History   HYDROcodone-acetaminophen (NORCO) 5-325 MG per tablet Take 2 tablets by mouth 3 times daily as needed for moderate to severe pain  prn med Yes Reported, Patient   IBUPROFEN PO Take 800 mg by mouth 2 times daily as needed for moderate pain  prn med Yes Reported, Patient   lurasidone (LATUDA) 60 MG TABS tablet Take 60  mg by mouth daily 8/29/2019 at Unknown time Yes Unknown, Entered By History   metFORMIN (GLUCOPHAGE) 1000 MG tablet Take 1,000 mg by mouth 2 times daily (with meals) 8/29/2019 at Unknown time Yes Reported, Patient   METHADONE HCL PO Take 10 mg by mouth 2 times daily  8/29/2019 at pm  Yes Reported, Patient   OMEPRAZOLE PO Take 20 mg by mouth every morning OR ZANTAC 8/29/2019 at Unknown time Yes Reported, Patient   Phenazopyridine HCl (PYRIDIUM PO) Take 200 mg by mouth 3 times daily as needed for irritation prn med Yes Unknown, Entered By History   prazosin (MINIPRESS) 5 MG capsule Take 10 mg by mouth At Bedtime 8/29/2019 at Unknown time Yes Unknown, Entered By History   propranolol ER (INDERAL LA) 80 MG 24 hr capsule Take 80 mg by mouth daily 8/29/2019 at Unknown time Yes Unknown, Entered By History   QUEtiapine Fumarate (SEROQUEL PO) Take 300 mg by mouth every evening  8/29/2019 at Unknown time Yes Reported, Patient   ranitidine (ZANTAC) 150 MG tablet Take 150 mg by mouth 2 times daily OR OMEPRAZOLE  Yes Unknown, Entered By History   Sertraline HCl (ZOLOFT PO) Take 100 mg by mouth daily 8/29/2019 at Unknown time Yes Unknown, Entered By History   venlafaxine (EFFEXOR-XR) 150 MG 24 hr capsule Take 300 mg by mouth daily 8/29/2019 at Unknown time Yes Unknown, Entered By History   Edda Rodriguez, PharmD

## 2019-08-30 NOTE — H&P
[facility]    History and Physical  Hospitalist       Date of Admission:  8/30/2019  Date of Service (when I saw the patient): 08/30/19    Assessment & Plan   Radha Gomez is a 54 year old female who presents with tooth pain. Admitted for further evaluation and treatment.     Dental abscess: In the emergency department the patient presents with a creatinine of 1.12.  Liver function testing is normal.  White blood cell count is 8.2 with a hemoglobin of 11.0.Patient INR 0.91.  Patient with a CT of the soft tissue and neck completed without contrast showing tooth #30 with a periapical abscess, see report for further details, tooth 14 with a periapical abscess.  Patient reports swelling in jaw and tooth and cheek pain.  Patient states that she was at the dental specialist yesterday for diagnosis of dental abscess and aspiration performed, unsuccessfully.  Patient returns because of ongoing issues.  Patient states that she has swelling and pain despite the aspiration, seen at the dentist.  Patient states that she has been taking antibiotics but it was getting worse.  Patient states that she can open her mouth and breathe.  Patient takes narcotics at home for pain relief.  Patient denies chest pain, shortness of breath, abdominal pain, nausea, vomiting, dysuria, diarrhea, constipation, lower extremity edema, rash, headache.  - Oral surgery consulted.   - NPO.   - IV Ampicillin.   - Close monitoring.   - Gentle IVF.     Anxiety, depression, bipolar, borderline: Stable  - PTA Xanax prn.   - PTA Rexulti  - PTA Quetiapine.   - PTA Sertraline.   - PTA Venlafaxine.     Cardiac, coronary artery disease, hyperlipidemia, hypertension: Stable. No anginal equivalent, denies chest pain or shortness of breath, low cardiac risk scoring.   - Hold PTA ASA 81mg  - PTA Metoprolol  - PTA Atorvastatin.     Chronic pain, back pain, back injury: Stable.   - PTA Norco  - PTA Methadone  - PTA Tizanidine prn.     Diabetes  mellitus: Patient maintained on metformin as an outpatient.  - PTA Metformin  - ISS    GERD: Stable.  - PTA Omeprazole.     DVT Prophylaxis: Pneumatic Compression Devices  Code Status: Full Code    Disposition: Pending oral surgery evaluation.     Dr. Joni Dela Cruz D.O.  [facility]  Pager [phone]    Primary Care Physician   J Carlos Parsons    Chief Complaint   Tooth pain and swelling.     History is obtained from the patient and medical records.     History of Present Illness   Radha Gomez is a 54 year old female who presents with tooth pain. Admitted for further evaluation and treatment. In the emergency department the patient presents with a creatinine of 1.12.  Liver function testing is normal.  White blood cell count is 8.2 with a hemoglobin of 11.0.Patient INR 0.91.  Patient with a CT of the soft tissue and neck completed without contrast showing tooth #30 with a periapical abscess, see report for further details, tooth 14 with a periapical abscess.  Patient reports swelling in jaw and tooth and cheek pain.  Patient states that she was at the dental specialist yesterday for diagnosis of dental abscess and aspiration performed, unsuccessfully.  Patient returns because of ongoing issues.  Patient states that she has swelling and pain despite the aspiration, seen at the dentist.  Patient states that she has been taking antibiotics but it was getting worse.  Patient states that she can open her mouth and breathe.  Patient takes narcotics at home for pain relief.  Patient denies chest pain, shortness of breath, abdominal pain, nausea, vomiting, dysuria, diarrhea, constipation, lower extremity edema, rash, headache.    Past Medical History    I have reviewed this patient's medical history and updated it with pertinent information if needed.   Past Medical History:   Diagnosis Date     Anxiety      Arthritis      Back injury      Bipolar 1 disorder (H)      Borderline personality  disorder (H)      Chest pain      Coronary artery disease     non-obstructive, per cath 4/2016     Depressive disorder      HLD (hyperlipidemia)      HTN (hypertension)     mild     IC (interstitial cystitis) age 21    feels like a  bladder infection- very painful; symptoms since 15     Osteoarthritis      Palpitations      PTSD (post-traumatic stress disorder)        Past Surgical History   I have reviewed this patient's surgical history and updated it with pertinent information if needed.  Past Surgical History:   Procedure Laterality Date     ABDOMEN SURGERY      3 laproscopies     BIOPSY      bladder     COLONOSCOPY  age 47     ESOPHAGOSCOPY, GASTROSCOPY, DUODENOSCOPY (EGD), COMBINED  6/24/2013    Procedure: COMBINED ESOPHAGOSCOPY, GASTROSCOPY, DUODENOSCOPY (EGD), BIOPSY SINGLE OR MULTIPLE;  gastroscopy;  Surgeon: Nathalie Cotter MD;  Location:  GI     GYN SURGERY      venereal warts removed     HC LEFT HEART CATHETERIZATION  4/15/16    non-obstructive CAD     LAPAROSCOPIC CHOLECYSTECTOMY  6/25/2013    Procedure: LAPAROSCOPIC CHOLECYSTECTOMY;  LAPAROSCOPIC CHOLECYSTECTOMY.;  Surgeon: Salomón Wall MD;  Location:  OR     laporoscopy       ORTHOPEDIC SURGERY         Prior to Admission Medications   Prior to Admission Medications   Prescriptions Last Dose Informant Patient Reported? Taking?   ALPRAZolam (XANAX PO)   Yes No   Sig: Take 0.5 mg by mouth daily as needed for anxiety    HYDROcodone-acetaminophen (NORCO) 5-325 MG per tablet  Spouse/Significant Other Yes No   Sig: Take 2 tablets by mouth 3 times daily as needed for moderate to severe pain    IBUPROFEN PO  Self Yes No   Sig: Take 800 mg by mouth 2 times daily as needed for moderate pain    MECLIZINE HCL PO   Yes No   Sig: Take 25 mg by mouth daily as needed for dizziness   METHADONE HCL PO  Pharmacy Yes No   Sig: Take 5 mg by mouth 3 times daily    OMEPRAZOLE PO  Spouse/Significant Other Yes No   Sig: Take 20 mg by mouth every morning     Phenazopyridine HCl (PYRIDIUM PO)  Self Yes No   Sig: Take 200 mg by mouth 3 times daily as needed for irritation   QUEtiapine Fumarate (SEROQUEL PO)   Yes No   Sig: Take 200 mg by mouth every evening    Sertraline HCl (ZOLOFT PO)   Yes No   Sig: Take 100 mg by mouth daily   TIZANIDINE HCL PO   Yes No   Sig: Take 4-8 mg by mouth daily as needed for muscle spasms    aspirin 81 MG EC tablet   No No   Sig: Take 1 tablet (81 mg) by mouth daily   atorvastatin (LIPITOR) 20 MG tablet   No No   Sig: Take 1 tablet (20 mg) by mouth daily   brexpiprazole (REXULTI) 0.5 MG tablet   Yes No   Sig: Take 0.5 mg by mouth daily    chlorhexidine (PERIDEX) 0.12 % solution   No No   Sig: Swish and spit 15 mLs in mouth 2 times daily   clindamycin (CLEOCIN) 300 MG capsule   No No   Sig: Take 1 capsule (300 mg) by mouth 3 times daily   metFORMIN (GLUCOPHAGE) 1000 MG tablet   Yes No   Sig: Take 1,000 mg by mouth 2 times daily (with meals)   metoprolol (TOPROL XL) 50 MG 24 hr tablet   No No   Sig: Take 1 tablet (50 mg) by mouth daily   norethindrone (AYGESTIN) 5 MG tablet   Yes No   Sig: Take 5 mg by mouth daily 14 days on then 14 days off to regulate periods   sennosides (SENOKOT) 8.6 MG tablet   Yes No   Sig: Take 1 tablet by mouth 2 times daily   venlafaxine (EFFEXOR XR) 75 MG 24 hr capsule   Yes No   Sig: Unsure of dosage      Facility-Administered Medications: None     Allergies   Allergies   Allergen Reactions     Abilify [Aripiprazole] Cramps     Total body- couldn't walk     Paxil [Paroxetine] Other (See Comments)     Total body pain     Contrast Dye Swelling     Ditropan [Oxybutynin Chloride]      Can't Urinate, Per Pt.     Wellbutrin [Bupropion] Palpitations     If not XL       Social History   I have reviewed this patient's social history and updated it with pertinent information if needed. Radha Gomez  reports that she quit smoking about 7 years ago. Her smoking use included cigarettes. She has a 35.00 pack-year smoking  history. She has never used smokeless tobacco. She reports that she does not drink alcohol or use drugs.    Family History   I have reviewed this patient's family history and updated it with pertinent information if needed.   Family History   Problem Relation Age of Onset     Depression Mother      Anxiety Disorder Mother      Substance Abuse Mother      Dementia Mother      Mental Illness Mother         sexually abused by step father      Coronary Artery Disease Mother      Diabetes Mother      Depression Father      Substance Abuse Father      Mental Illness Father         [employer] vet- blamed others, angry     Heart Failure Father      Dementia Maternal Grandmother      Suicide Sister 36        OD     Mental Illness Sister         DID, neglected, abused and in foster homes     Bipolar Disorder Son      Depression Son      Mental Illness Son         ODD     Suicide Daughter 15        attempted X2, OD and cutting; OD     Depression Daughter      Anxiety Disorder Daughter      Mental Illness Daughter 12        trichitillimania. PTSD     Depression Sister      Anxiety Disorder Sister      Depression Sister      Anxiety Disorder Sister      Suicide Sister 38        post partum- lost the son-     Substance Abuse Sister        Review of Systems   The 10 point Review of Systems is negative other than noted in the HPI or here.     Physical Exam   Temp: 97.7  F (36.5  C) Temp src: Temporal BP: (!) 176/76 Pulse: 67   Resp: 16 SpO2: 97 % O2 Device: None (Room air)    Vital Signs with Ranges  Temp:  [97.7  F (36.5  C)] 97.7  F (36.5  C)  Pulse:  [67] 67  Resp:  [16] 16  BP: (176)/(76) 176/76  SpO2:  [97 %] 97 %  230 lbs 0 oz    GENERAL: Alert. Conversational, appropriate. Disheveled.   HEENT: Normocephalic. EOMI. No icterus or injection. Nares normal.  Tooth pain and swelling present.  Able to breathe comfortably.  No sign of trismus.  LUNGS: Clear to auscultation. No dyspnea at rest.   HEART: Regular rate. Extremities  perfused.   ABDOMEN: Obese. Soft, nontender, and nondistended. Positive bowel sounds.   EXTREMITIES: No LE edema noted.   NEUROLOGIC: Moves extremities x4, follows commands.    Data   Data reviewed today:  I personally reviewed both laboratory and imaging data.   Recent Labs   Lab 08/30/19  1638   WBC 8.2   HGB 11.0*   MCV 88         POTASSIUM 4.5   CHLORIDE 111*   CO2 25   BUN 17   CR 1.12*   ANIONGAP 6   MALACHI 8.5   *   ALBUMIN 3.2*   PROTTOTAL 6.9   BILITOTAL 0.2   ALKPHOS 106   ALT 24   AST 16       Recent Results (from the past 24 hour(s))   CT Soft Tissue Neck w/o Contrast    Narrative    CT SCAN OF THE NECK WITHOUT CONTRAST  8/30/2019 5:34 PM     HISTORY: Likely periapical abscess with neck swelling extending into  face.    TECHNIQUE: Axial CT images of the neck without administration of  intravenous contrast with reformations. Radiation dose for this scan  was reduced using automated exposure control, adjustment of the mA  and/or kV according to patient size, or iterative reconstruction  technique.    COMPARISON: None.     FINDINGS:   Teeth: Tooth #30 periapical abscess is present. There is overlying  soft tissue swelling throughout the right cheek. No soft tissue  abscess or drainable fluid collection is identified. There is  overlying inflammatory change extending along the right lower neck  with asymmetric thickening of the right platysma. There is also a  periapical abscess involving tooth #14.    Visualized sinuses, nasopharynx and orbits: Unremarkable     Tongue, oral cavity and oropharynx:  Unremarkable     Hypopharynx: Unremarkable     Larynx and trachea: Unremarkable     Thyroid: Unremarkable     Submandibular glands: Unremarkable     Parotid glands: Normal.       Lymph nodes: Mildly enlarged right level Ib lymph nodes are present,  likely reactive.     Upper mediastinum and lungs: There are groundglass opacities within  the right lung apex which could represent atelectasis,  aspiration, or  infection. The esophagus is patulous. Possible small right pleural  effusion.     Bones: Moderate cervical spine degenerative changes present most  conspicuous at C5-C6 and C6-C7.       Impression    IMPRESSION:  1. Tooth #30 periapical abscess with overlying inflammatory change  extending into the right neck, consistent with cellulitis. No evidence  of drainable soft tissue abscess.  2. Tooth #14 periapical abscess.  3. Right level Ib lymphadenopathy, likely reactive.    OLLIE CHURCHILL MD

## 2019-08-31 PROBLEM — G89.29 CHRONIC PAIN: Status: ACTIVE | Noted: 2019-08-31

## 2019-08-31 PROBLEM — E11.9 TYPE 2 DIABETES MELLITUS (H): Status: ACTIVE | Noted: 2019-08-31

## 2019-08-31 LAB
ANION GAP SERPL CALCULATED.3IONS-SCNC: 5 MMOL/L (ref 3–14)
BUN SERPL-MCNC: 9 MG/DL (ref 7–30)
CALCIUM SERPL-MCNC: 8.3 MG/DL (ref 8.5–10.1)
CHLORIDE SERPL-SCNC: 108 MMOL/L (ref 94–109)
CO2 SERPL-SCNC: 27 MMOL/L (ref 20–32)
CREAT SERPL-MCNC: 0.9 MG/DL (ref 0.52–1.04)
ERYTHROCYTE [DISTWIDTH] IN BLOOD BY AUTOMATED COUNT: 14 % (ref 10–15)
GFR SERPL CREATININE-BSD FRML MDRD: 72 ML/MIN/{1.73_M2}
GLUCOSE SERPL-MCNC: 217 MG/DL (ref 70–99)
HCT VFR BLD AUTO: 31.6 % (ref 35–47)
HGB BLD-MCNC: 10.5 G/DL (ref 11.7–15.7)
MAGNESIUM SERPL-MCNC: 1.4 MG/DL (ref 1.6–2.3)
MCH RBC QN AUTO: 29.5 PG (ref 26.5–33)
MCHC RBC AUTO-ENTMCNC: 33.2 G/DL (ref 31.5–36.5)
MCV RBC AUTO: 89 FL (ref 78–100)
PLATELET # BLD AUTO: 253 10E9/L (ref 150–450)
POTASSIUM SERPL-SCNC: 4 MMOL/L (ref 3.4–5.3)
RBC # BLD AUTO: 3.56 10E12/L (ref 3.8–5.2)
SODIUM SERPL-SCNC: 140 MMOL/L (ref 133–144)
WBC # BLD AUTO: 6.6 10E9/L (ref 4–11)

## 2019-08-31 PROCEDURE — 25000132 ZZH RX MED GY IP 250 OP 250 PS 637: Performed by: HOSPITALIST

## 2019-08-31 PROCEDURE — 85027 COMPLETE CBC AUTOMATED: CPT | Performed by: DENTIST

## 2019-08-31 PROCEDURE — 83735 ASSAY OF MAGNESIUM: CPT | Performed by: DENTIST

## 2019-08-31 PROCEDURE — 99232 SBSQ HOSP IP/OBS MODERATE 35: CPT | Performed by: INTERNAL MEDICINE

## 2019-08-31 PROCEDURE — 36415 COLL VENOUS BLD VENIPUNCTURE: CPT | Performed by: DENTIST

## 2019-08-31 PROCEDURE — 25800030 ZZH RX IP 258 OP 636: Performed by: DENTIST

## 2019-08-31 PROCEDURE — 25000128 H RX IP 250 OP 636: Performed by: DENTIST

## 2019-08-31 PROCEDURE — 25000132 ZZH RX MED GY IP 250 OP 250 PS 637: Performed by: INTERNAL MEDICINE

## 2019-08-31 PROCEDURE — 25000132 ZZH RX MED GY IP 250 OP 250 PS 637: Performed by: DENTIST

## 2019-08-31 PROCEDURE — 83735 ASSAY OF MAGNESIUM: CPT | Performed by: INTERNAL MEDICINE

## 2019-08-31 PROCEDURE — 80048 BASIC METABOLIC PNL TOTAL CA: CPT | Performed by: DENTIST

## 2019-08-31 PROCEDURE — 12000000 ZZH R&B MED SURG/OB

## 2019-08-31 RX ORDER — QUETIAPINE FUMARATE 100 MG/1
300 TABLET, FILM COATED ORAL EVERY EVENING
Status: DISCONTINUED | OUTPATIENT
Start: 2019-08-31 | End: 2019-09-01 | Stop reason: HOSPADM

## 2019-08-31 RX ORDER — HYDROCODONE BITARTRATE AND ACETAMINOPHEN 5; 325 MG/1; MG/1
1-2 TABLET ORAL EVERY 4 HOURS PRN
Status: DISCONTINUED | OUTPATIENT
Start: 2019-08-31 | End: 2019-09-01 | Stop reason: HOSPADM

## 2019-08-31 RX ADMIN — SODIUM CHLORIDE: 9 INJECTION, SOLUTION INTRAVENOUS at 15:44

## 2019-08-31 RX ADMIN — HYDROCODONE BITARTRATE AND ACETAMINOPHEN 2 TABLET: 5; 325 TABLET ORAL at 20:12

## 2019-08-31 RX ADMIN — QUETIAPINE FUMARATE 300 MG: 100 TABLET ORAL at 20:12

## 2019-08-31 RX ADMIN — HYDROMORPHONE HYDROCHLORIDE 0.5 MG: 1 INJECTION, SOLUTION INTRAMUSCULAR; INTRAVENOUS; SUBCUTANEOUS at 21:08

## 2019-08-31 RX ADMIN — ACETAMINOPHEN 650 MG: 325 TABLET ORAL at 09:24

## 2019-08-31 RX ADMIN — AMPICILLIN SODIUM AND SULBACTAM SODIUM 3 G: 2; 1 INJECTION, POWDER, FOR SOLUTION INTRAMUSCULAR; INTRAVENOUS at 11:02

## 2019-08-31 RX ADMIN — HYDROMORPHONE HYDROCHLORIDE 0.5 MG: 1 INJECTION, SOLUTION INTRAMUSCULAR; INTRAVENOUS; SUBCUTANEOUS at 04:22

## 2019-08-31 RX ADMIN — ONDANSETRON 4 MG: 2 INJECTION INTRAMUSCULAR; INTRAVENOUS at 08:22

## 2019-08-31 RX ADMIN — CHLORHEXIDINE GLUCONATE 15 ML: 1.2 RINSE ORAL at 08:29

## 2019-08-31 RX ADMIN — CHLORHEXIDINE GLUCONATE 15 ML: 1.2 RINSE ORAL at 20:19

## 2019-08-31 RX ADMIN — HYDROMORPHONE HYDROCHLORIDE 0.5 MG: 1 INJECTION, SOLUTION INTRAMUSCULAR; INTRAVENOUS; SUBCUTANEOUS at 17:30

## 2019-08-31 RX ADMIN — HYDROMORPHONE HYDROCHLORIDE 0.5 MG: 1 INJECTION, SOLUTION INTRAMUSCULAR; INTRAVENOUS; SUBCUTANEOUS at 08:27

## 2019-08-31 RX ADMIN — QUETIAPINE FUMARATE 300 MG: 100 TABLET ORAL at 00:54

## 2019-08-31 RX ADMIN — MAGNESIUM SULFATE IN WATER 4 G: 40 INJECTION, SOLUTION INTRAVENOUS at 20:16

## 2019-08-31 RX ADMIN — HYDROMORPHONE HYDROCHLORIDE 0.5 MG: 1 INJECTION, SOLUTION INTRAMUSCULAR; INTRAVENOUS; SUBCUTANEOUS at 13:38

## 2019-08-31 RX ADMIN — HYDROCODONE BITARTRATE AND ACETAMINOPHEN 1 TABLET: 5; 325 TABLET ORAL at 15:49

## 2019-08-31 RX ADMIN — HYDROMORPHONE HYDROCHLORIDE 0.5 MG: 1 INJECTION, SOLUTION INTRAMUSCULAR; INTRAVENOUS; SUBCUTANEOUS at 06:40

## 2019-08-31 RX ADMIN — HYDROMORPHONE HYDROCHLORIDE 0.5 MG: 1 INJECTION, SOLUTION INTRAMUSCULAR; INTRAVENOUS; SUBCUTANEOUS at 11:08

## 2019-08-31 RX ADMIN — AMPICILLIN SODIUM AND SULBACTAM SODIUM 3 G: 2; 1 INJECTION, POWDER, FOR SOLUTION INTRAMUSCULAR; INTRAVENOUS at 05:39

## 2019-08-31 RX ADMIN — MAGNESIUM SULFATE IN WATER 4 G: 40 INJECTION, SOLUTION INTRAVENOUS at 15:44

## 2019-08-31 RX ADMIN — AMPICILLIN SODIUM AND SULBACTAM SODIUM 3 G: 2; 1 INJECTION, POWDER, FOR SOLUTION INTRAMUSCULAR; INTRAVENOUS at 00:08

## 2019-08-31 RX ADMIN — ACETAMINOPHEN 650 MG: 325 TABLET ORAL at 13:41

## 2019-08-31 ASSESSMENT — ACTIVITIES OF DAILY LIVING (ADL)
ADLS_ACUITY_SCORE: 13
ADLS_ACUITY_SCORE: 11
ADLS_ACUITY_SCORE: 11
ADLS_ACUITY_SCORE: 13
ADLS_ACUITY_SCORE: 11
ADLS_ACUITY_SCORE: 13

## 2019-08-31 NOTE — PROGRESS NOTES
[facility] Brief Consult Note    54 year old female with 4 days of right sided facial pain, 2 days of swelling with failure of outpatient clindamycin therapy. Afebrile.     Physical Exam:   Blood pressure 130/73, pulse 65, temperature 97.4  F (36.3  C), temperature source Oral, resp. rate 18, weight 106.9 kg (235 lb 10.8 oz), SpO2 96 %.  Right mandibular swelling and erythema. Swelling is in buccal region and extends into submandibular region. Inferior border of mandible is not palpable in area of swelling. Mandibular opening >45mm. Tenderness to percussion/palpation of tooth #30, residual root fragment tooth #28.     No leukocytosis. WBC 8.2  No coagulopathy. INR 0.91 Plt 256    Assessment/Plan: 54 year old female with history of diabetes, anxiety, depression, CAD, HTN, PTSD who presents with right mandibular cellulitis vs. Abscess related to tooth #30, as well as retained root tip of tooth #28.     Pre-op H&P with Hospitalist, patient deemed low cardiac risk to proceed.    To OR for I&D and extraction of #28,30. Risks reviewed as noted in pre-op note. Patient elects to proceed as discussed. See consult note for full plan.     Rachid Pérez DDS  [facility]  [address], [address]  [place] [address]  Clinic/On-call Phone: [phone]  Clinic Fax: [phone]

## 2019-08-31 NOTE — PROGRESS NOTES
RN [id number]:  Patient continues with pain/swelling  to right side of face.  Getting PO norco/IV dilaudid for breakthrough & tylenol as needed.  Tolerating diet.  Magnesium 1.4, started to replace IV & pt complained of IV hurting even after slowing it down.  Changed IV.  Will try again after IV antibiotic finished. Vss

## 2019-08-31 NOTE — BRIEF OP NOTE
"   [facility]    Brief Operative Note    Pre-operative diagnosis: Right mandibular cellulitis vs. Abscess, odontogenic abscess #30. Retained root #28  Post-operative diagnosis Right mandibular abscess, odontogenic abscess #30. Retained root #28  Procedure: Procedure(s):  INCISION AND DRAINAGE, MANDIBLE ABSCESS  EXTRACTION, TOOTH #30 AND #28  Surgeon: Surgeon(s) and Role:     * Rachid Pérez DDS - Primary  Anesthesia: General   Estimated blood loss: 15 mL  Drains:  Penrose drain (1/4\") - sutured with 3-0 silk in right mandibular vestibule  Specimens:   ID Type Source Tests Collected by Time Destination   1 : Right Mandible Abscess Fluid Mandible ANAEROBIC BACTERIAL CULTURE, FLUID CULTURE AEROBIC BACTERIAL, GRAM STAIN aRchid Pérez DDS 8/30/2019  9:07 PM      Findings:   2cc purulent drainage right mandibular vestibule.  Complications: None  Implants:  * No implants in log *     Rachid Pérez DDS  [facility]  [address], [address]  [place] [address]  Clinic/On-call Phone: [phone]  Clinic Fax: [phone]        "

## 2019-08-31 NOTE — PLAN OF CARE
Cognitive Concerns/ Orientation : Alert and oriented x 4    BEHAVIOR & AGGRESSION TOOL COLOR: Green   CIWA SCORE: n/a     ABNL VS/O2: VSS 95% 3 lpm NC   MOBILITY: SBA   PAIN MANAGMENT: Complains of right side facial pain, prn dilaudid and ice packs as needed   DIET: [facility] dental soft   BOWEL/BLADDER: Continent   ABNL LAB/BG: n/a   DRAIN/DEVICES: Penrose drain where tooth #30 was located.  Peripheral IV right hand infusing NS at 75cc per hour.   TELEMETRY RHYTHM: n/a   SKIN: Bruising   TESTS/PROCEDURES: I&D of mandible abscess and tooth extraction of #28 and #30 on 8-30-19   D/C DAY/GOALS/PLACE: pending   OTHER IMPORTANT INFO: Capnography in place.  IV Unasyn per orders.

## 2019-08-31 NOTE — PROGRESS NOTES
Oral & Maxillofacial Surgery pre-operative note:    Radha Gomez MRN# [id number]   Age: 54 year old YOB: [birth year]    Pre-Operative Diagnosis:  1. Right mandibular cellulitis vs. Abscess  2. Odontogenic abscess, tooth #30  3. Retained root #28    Planned Procedure:  1. Extraction of teeth #28,30  2. Incision and drainage, right mandible    Planned Anesthesia: General via oral endotracheal tube    Surgeon: Rachid Pérez DDS    Consent:  Written and verbal consent obtained from patient.  Discussion included risks/complications including but not limited post-operative pain, swelling, bleeding, infection, dehiscence of wounds, need for follow-up, temporary/permanent paresthesia/anesthesia of CN V3 mental nerve distribution, damage to CN VII (motor to muscles of facial expression) with temporary or permanent paralysis, scar formation, malocclusion, failure to resolve chief complaint, or need for additional procedures.  Patient agrees to procedure as written, signed consent in chart.    Rachid Pérez DDS  [facility]  [address], [address]  [place] [address]  Clinic/On-call Phone: [phone]  Clinic Fax: [phone]

## 2019-08-31 NOTE — OP NOTE
Oral & Maxillofacial Surgery Operative Report  [facility]     Date of service: 08/30/2019    Patient: Radha Gomez  Patient MRN: [id number]  Patient YOB: 1965    Surgeon: Rachid Pérez DDS    Pre-operative diagnosis:  1. Right mandibular cellulitis vs. Abscess  2. Odontogenic abscess, tooth #30  3. Retained root #28    Post-operative diagnosis:  1. Right mandibular vestibular abscess  2. Odontogenic abscess, tooth #30  3. Retained root #28    Procedures performed  1. Extraction of teeth #28,30  2. Incision and drainage, right mandibular vestibule    Anesthesia: General via oral endotracheal tube. 4ml of 2% lidocaine with 1:100,000 epinehprine administered    Indications for the procedure: See full consult note. Radha Gomez is a 54 year old female who presented with worsening right mandibular pain and swelling following outpatient failure of clindamycin therapy. Patient afebrile, no leukocytosis, no airway compromise at present; however, worsening swelling with extension into submandibular region with concern for airway compromise, notably going into the Labor Day weekend. Recommended urgent extraction of teeth #28,30 and incision and drainage to allow for resolution of infectious process and minimize risk potential airway compromise. Written and verbal consent obtained from patient.  Discussion included risks/complications including but not limited post-operative pain, swelling, bleeding, infection, dehiscence of wounds, need for follow-up, temporary/permanent paresthesia/anesthesia of CN V3 mental nerve distribution, damage to CN VII (motor to muscles of facial expression) with temporary or permanent paralysis, scar formation, malocclusion, failure to resolve chief complaint, or need for additional procedures.  Patient agrees to procedure as written, signed consent in chart.      Description of procedure: The patient was met in the preoperative holding area. The history and  physical was reviewed and the informed consent was reviewed and signed. The patient met with the anesthesia team, reviewed the history and physical and no contraindications were noted. The patient was then transported to operating room #31 via [facility]. The patient was transferred from the [facility] to the operating room table. The patient was then placed in the supine position. The patient was appropriately tucked and padded. The patient then underwent a smooth IV induction and placement of an oral endotracheal tube. The patient was then prepped and draped in a customary fashion for oral maxillofacial surgical procedures. A timeout was performed according to the [facility] protocol.     Local anesthesia was administered as noted above. A throat pack was placed. Tooth #28 was elevated with a #2 molt curette and removed. The site was curetted and irrigated. The right mandibular vestibular swelling was noted buccal to teeth #29,30,31. A vestibular incision was made along the distal of #30 and mesial of #31 approximately 1cm in length. Incision made along distal of #30 to avoid the mental foramen/mental nerve. Approximately 2cc of purulent drainage encountered upon incision. Culture swab obtained. Exploration with closed hemostats to break up loculations. Copious irrigation. Removed tooth #30 with #23 forceps. Curettage/irrigation. Penrose drain placed into vestibular incision and secured with 3-0 silk suture. Copious irrigation of sites and suction. Ghost pack placed at site #30, hemostasis noted.     The throat pack was then removed with suction. The needle and sponge counts were noted to be correct.  The patient was then turned over to the Anesthesia team for emergence from anesthesia and was extubated in the operating room without complications. The patient was then transported to the PACU in stable condition with plans for transfer to the floor for post-operative airway monitoring and pain  "control.    EBL: 15 mL  Fluids: See anesthesia report  Drains: 1/4\" penrose, right mandibular vestibule  Complications: None  Specimens: Right mandibular abscess, aerobe/anaerobe and gram stain  Findings: 2cc purulent drainage, right mandibular vestibule. Residual root tip #28. Forcep (23 forcep) removal of tooth #30 with granulation tissue noted at apex of #30 (curetted as above).     Rachid Pérez DDS  [facility]  [facility], [address]  [place] [address]  Clinic/On-call Phone: [phone]  Clinic Fax: [phone]            "

## 2019-08-31 NOTE — PLAN OF CARE
DATE & TIME: 8/31/19 7-3pm   Cognitive Concerns/ Orientation : A&OX4   BEHAVIOR & AGGRESSION TOOL COLOR: Green  CIWA SCORE: NA   ABNL VS/O2: Vss, on RA  MOBILITY: Independent  PAIN MANAGMENT: IV dilaudid (Md to change to PO norco)/PO tylenol  DIET: Mechanical soft  BOWEL/BLADDER: Continent of both  ABNL LAB/BG: WNL  DRAIN/DEVICES: 2 teeth removed #28 & #30, has penrose drain   TELEMETRY RHYTHM: NA  SKIN: WNL  TESTS/PROCEDURES: NA  D/C DAY/GOALS/PLACE: Discharge tomorrow  OTHER IMPORTANT INFO: Right side of face/chin/neck swollen, but improved.  Getting IV antibiotics. Pt refused Capno.  Tolerating diet.  Using ice packs, saline swich/spit.

## 2019-08-31 NOTE — ANESTHESIA PREPROCEDURE EVALUATION
Anesthesia Pre-Procedure Evaluation    Patient: Radha Gomez   MRN: [id number] : 1965          Preoperative Diagnosis: unknown    Procedure(s):  INCISION AND DRAINAGE, MANDIBLE Abscess  EXTRACTION, TOOTH #30    Past Medical History:   Diagnosis Date     Anxiety      Arthritis      Back injury      Bipolar 1 disorder (H)      Borderline personality disorder (H)      Chest pain      Coronary artery disease     non-obstructive, per cath 2016     Depressive disorder      HLD (hyperlipidemia)      HTN (hypertension)     mild     IC (interstitial cystitis) age 21    feels like a  bladder infection- very painful; symptoms since 16     Osteoarthritis      Palpitations      PTSD (post-traumatic stress disorder)      Past Surgical History:   Procedure Laterality Date     ABDOMEN SURGERY      3 laproscopies     BIOPSY      bladder     COLONOSCOPY  age 47     ESOPHAGOSCOPY, GASTROSCOPY, DUODENOSCOPY (EGD), COMBINED  2013    Procedure: COMBINED ESOPHAGOSCOPY, GASTROSCOPY, DUODENOSCOPY (EGD), BIOPSY SINGLE OR MULTIPLE;  gastroscopy;  Surgeon: Nathalie Cotter MD;  Location:  GI     GYN SURGERY      venereal warts removed     HC LEFT HEART CATHETERIZATION  4/15/16    non-obstructive CAD     LAPAROSCOPIC CHOLECYSTECTOMY  2013    Procedure: LAPAROSCOPIC CHOLECYSTECTOMY;  LAPAROSCOPIC CHOLECYSTECTOMY.;  Surgeon: Salomón Wall MD;  Location:  OR     laporoscopy       ORTHOPEDIC SURGERY         Anesthesia Evaluation     . Pt has had prior anesthetic.     No history of anesthetic complications          ROS/MED HX    ENT/Pulmonary:     (+), . Other pulmonary disease dental abscess.   (-) sleep apnea   Neurologic:       Cardiovascular:     (+) Dyslipidemia, -range: pt denies having this at all and is only on propranolol for anxiety, -CAD, --. : . . . :. . Previous cardiac testing date:results:Stress Testdate:2016 results:EF normal, negative for ischemia date: results:Cath date: 2016 results:Mild  Non-obstructing CAD         (-) hypertension   METS/Exercise Tolerance:     Hematologic:         Musculoskeletal:         GI/Hepatic:        (-) GERD   Renal/Genitourinary:     (+) Other Renal/ Genitourinary, interstitial cystitis      Endo:     (+) type II DM (metformin) Not using insulin Obesity (BMI 41), .      Psychiatric:     (+) psychiatric history (Bipolar I, Borderline personality disorder, PTSD) anxiety, bipolar and other (comment)      Infectious Disease:         Malignancy:         Other: Comment: Methadone and norco     (+) H/O Chronic Pain,H/O chronic opiod use ,                         Physical Exam  Normal systems: cardiovascular, pulmonary and dental    Airway   Mallampati: III  TM distance: >3 FB  Neck ROM: full    Dental     Cardiovascular       Pulmonary     Other findings: Pt could open her mouth pretty well        Lab Results   Component Value Date    WBC 8.2 08/30/2019    HGB 11.0 (L) 08/30/2019    HCT 32.8 (L) 08/30/2019     08/30/2019     08/30/2019    POTASSIUM 4.5 08/30/2019    CHLORIDE 111 (H) 08/30/2019    CO2 25 08/30/2019    BUN 17 08/30/2019    CR 1.12 (H) 08/30/2019     (H) 08/30/2019    MALACHI 8.5 08/30/2019    ALBUMIN 3.2 (L) 08/30/2019    PROTTOTAL 6.9 08/30/2019    ALT 24 08/30/2019    AST 16 08/30/2019    ALKPHOS 106 08/30/2019    BILITOTAL 0.2 08/30/2019    LIPASE 82 06/23/2013    PTT 27 04/15/2016    INR 0.91 08/30/2019    TSH 1.22 03/31/2016    T4 1.45 06/24/2014    HCG Negative 04/21/2015    HCGS Negative 04/15/2016       Preop Vitals  BP Readings from Last 3 Encounters:   08/30/19 133/69   08/29/19 133/73   07/02/16 132/78    Pulse Readings from Last 3 Encounters:   08/30/19 65   07/02/16 84   05/26/16 60      Resp Readings from Last 3 Encounters:   08/30/19 16   08/29/19 16   07/02/16 16    SpO2 Readings from Last 3 Encounters:   08/30/19 98%   08/29/19 95%   07/02/16 100%      Temp Readings from Last 1 Encounters:   08/30/19 36.5  C (97.7  F) (Temporal)     "Ht Readings from Last 1 Encounters:   08/29/19 1.588 m (5' 2.5\")      Wt Readings from Last 1 Encounters:   08/30/19 104.3 kg (230 lb)    Estimated body mass index is 41.4 kg/m  as calculated from the following:    Height as of 8/29/19: 1.588 m (5' 2.5\").    Weight as of this encounter: 104.3 kg (230 lb).       Anesthesia Plan      History & Physical Review  History and physical reviewed and following examination; no interval change.    ASA Status:  3 emergent.    NPO Status:  > 8 hours    Plan for General, RSI and ETT with Intravenous induction. Maintenance will be Balanced.    PONV prophylaxis:  Ondansetron (or other 5HT-3)  Oral ETT per surgeon request      Postoperative Care  Postoperative pain management:  IV analgesics.      Consents  Anesthetic plan, risks, benefits and alternatives discussed with:  Patient..                 Griffin Irwin MD  "

## 2019-08-31 NOTE — PROVIDER NOTIFICATION
Patient returned from PACU at 2315 requesting to have applesauce and water.  Continues to have NPO except for meds order, call out to hospitalist service, awaiting return call.

## 2019-08-31 NOTE — ANESTHESIA CARE TRANSFER NOTE
Patient: Radha Gomez    Procedure(s):  INCISION AND DRAINAGE, MANDIBLE ABSCESS  EXTRACTION, TOOTH #30 AND #28    Diagnosis: unknown  Diagnosis Additional Information: No value filed.    Anesthesia Type:   General, RSI, ETT     Note:  Airway :Face Mask  Patient transferred to:PACU  Comments: Transferred to PACU, spontaneous respirations, 10L oxygen via facemask.  All monitors and alarms on and functioning, VSS.  Patient awake, comfortable.  Report to PACU RN.Handoff Report: Identifed the Patient, Identified the Reponsible Provider, Reviewed the pertinent medical history, Discussed the surgical course, Reviewed Intra-OP anesthesia mangement and issues during anesthesia, Set expectations for post-procedure period and Allowed opportunity for questions and acknowledgement of understanding      Vitals: (Last set prior to Anesthesia Care Transfer)    CRNA VITALS  8/30/2019 2058 - 8/30/2019 2136 8/30/2019             SpO2:  89 %  (Abnormal)                 Electronically Signed By: JONY Jarvis CRNA  August 30, 2019  9:36 PM

## 2019-08-31 NOTE — PROGRESS NOTES
[facility] Progress Note    Radha Gomez  1965  [id number]    Today's Date: 08/31/19    S: POD #1 s/p I&D right mandibular vestibular abscess and removal of teeth #28,30. Pain controlled. Post-surgical edema, no significant increase in swelling. Sore throat, but no dysphagia or odynophagia. Afebrile. Denies nausea/vomiting. No hypoesthesia/paresthesia of the lip/chin.     O: Blood pressure 139/74, pulse 95, temperature 98.1  F (36.7  C), temperature source Oral, resp. rate 16, weight 107.1 kg (236 lb 1.8 oz), SpO2 93 %.  Gen: Sitting up in bed, no acute distress, pleasant  HEENT: Right buccal and submandibular swelling. Tenderness to palpation. Right inferior mandibular border not palpable. Overlying skin erythema. No erythema extension into neck or clavicular region. Mandibular opening 45mm. Right mandibular vestibular penrose drain in place. Surgical sites hemostatic. Floor of mouth soft, non-tender, non-distended.   Neck: No lymphadenopathy. No neck erythema. Normal range of motion.   Neuro: CN V3 sensation intact to light touch along the right lip/chin.    WBC   Date Value Ref Range Status   08/31/2019 6.6 4.0 - 11.0 10e9/L Final     A/P: 54 year old female with a history of anxiety, chronic pain management (on methadone), bipolar disorder, CAD, HTN, PTSD POD #1 s/p I&D right mandibular vestibular abscess and extraction of teeth #28,30. Progressing as expected.    - Pain control, transition to PO for discharge planning. Appreciate Hospitalist cares and management given chronic pain history.  - Continue IV unasyn while inpatient, augmentin on discharge. Would like to see at least 24h of IV antibiotics post-op. Will follow cultures and sensitivities.   - Monitor airway. Continuous pulse oximetry  - Mechanical soft diet  - Continue penrose drain at this time. Will remove in 1-2 days, [facility] to remove.    Thank you for your consultation. [facility] will continue to follow. Anticipate discharge tomorrow pending airway  stability and pain management.  Please contact [facility] with any questions or concerns.     Rachid Pérez DDS  [facility]  [address], [address]  [place] [address]  Clinic/On-call Phone: [phone]  Clinic Fax: [phone]

## 2019-08-31 NOTE — PROGRESS NOTES
[facility]    Hospitalist Progress Note    Assessment & Plan   54 year old female who was admitted on 8/30/2019 with tooth abscess and swollen right jaw:    Principal Problem:    Tooth abscess -- S/P 2 teeth extracted 8/30/19    Active Problems:    Bipolar 1 disorder (H)      HTN (hypertension)      Chronic pain -- on Methadone      DM 2 -- Hgb A1C 6.0 on 5/29/19       Plan:  Continue IV antibiotics and pain medications.  Discussed with patient and .   DVT Prophylaxis: Pneumatic Compression Devices  Code Status: Full Code    Disposition: Expected discharge in 1 day     Tim Ellis MD  Pager [phone]  Cell Phone [phone]  Text Page (7am to 6pm)    Interval History   Right jaw pain improving, but still 5 out of 10 and moderate jaw swelling.     Physical Exam   Temp: 98.1  F (36.7  C) Temp src: Oral BP: 139/74 Pulse: 95 Heart Rate: 94 Resp: 18 SpO2: 93 % O2 Device: None (Room air) Oxygen Delivery: 3 LPM  Vitals:    08/30/19 1746 08/30/19 1945 08/31/19 0500   Weight: 104.3 kg (230 lb) 106.9 kg (235 lb 10.8 oz) 107.1 kg (236 lb 1.8 oz)     Vital Signs with Ranges  Temp:  [97.4  F (36.3  C)-98.8  F (37.1  C)] 98.1  F (36.7  C)  Pulse:  [64-95] 95  Heart Rate:  [59-94] 94  Resp:  [12-18] 18  BP: (130-176)/(50-99) 139/74  SpO2:  [92 %-98 %] 93 %  I/O last 3 completed shifts:  In: 2577 [P.O.:290; I.V.:1187; IV Piggyback:1100]  Out: 15 [Blood:15]    # Pain Assessment:  Current Pain Score 8/31/2019   Patient currently in pain? yes   Pain score (0-10) 8   Pain location -   Pain descriptors -   - Radha is experiencing pain due to tooth abscess. Pain management was discussed and the plan was created in a collaborative fashion.  Radha's response to the current recommendations: see orders    Constitutional: Awake, alert, cooperative, no apparent distress  Respiratory: Clear to auscultation bilaterally, no crackles or wheezing  Cardiovascular: Regular rate and rhythm, normal S1 and S2, and  no murmur noted  GI: Normal bowel sounds, soft, non-distended, non-tender  Extrem: No calf tenderness, no ankle edema  Neuro: Ox3, no focal motor or sensory deficits  HEENT:  Moderate redness and swelling and tenderness over right mandibular area     Medications     sodium chloride 75 mL/hr at 08/30/19 2352       ampicillin-sulbactam (UNASYN) IV  3 g Intravenous Q6H     chlorhexidine  15 mL Swish & Spit BID     QUEtiapine  300 mg Oral QPM     sodium chloride (PF)  3 mL Intracatheter Q8H       Data   Recent Labs   Lab 08/31/19  0908 08/30/19  1638   WBC 6.6 8.2   HGB 10.5* 11.0*   MCV 89 88    256   INR  --  0.91    142   POTASSIUM 4.0 4.5   CHLORIDE 108 111*   CO2 27 25   BUN 9 17   CR 0.90 1.12*   ANIONGAP 5 6   MALACHI 8.3* 8.5   * 123*   ALBUMIN  --  3.2*   PROTTOTAL  --  6.9   BILITOTAL  --  0.2   ALKPHOS  --  106   ALT  --  24   AST  --  16       Imaging:   Recent Results (from the past 24 hour(s))   CT Soft Tissue Neck w/o Contrast    Narrative    CT SCAN OF THE NECK WITHOUT CONTRAST  8/30/2019 5:34 PM     HISTORY: Likely periapical abscess with neck swelling extending into  face.    TECHNIQUE: Axial CT images of the neck without administration of  intravenous contrast with reformations. Radiation dose for this scan  was reduced using automated exposure control, adjustment of the mA  and/or kV according to patient size, or iterative reconstruction  technique.    COMPARISON: None.     FINDINGS:   Teeth: Tooth #30 periapical abscess is present. There is overlying  soft tissue swelling throughout the right cheek. No soft tissue  abscess or drainable fluid collection is identified. There is  overlying inflammatory change extending along the right lower neck  with asymmetric thickening of the right platysma. There is also a  periapical abscess involving tooth #14.    Visualized sinuses, nasopharynx and orbits: Unremarkable     Tongue, oral cavity and oropharynx:  Unremarkable     Hypopharynx:  Unremarkable     Larynx and trachea: Unremarkable     Thyroid: Unremarkable     Submandibular glands: Unremarkable     Parotid glands: Normal.       Lymph nodes: Mildly enlarged right level Ib lymph nodes are present,  likely reactive.     Upper mediastinum and lungs: There are groundglass opacities within  the right lung apex which could represent atelectasis, aspiration, or  infection. The esophagus is patulous. Possible small right pleural  effusion.     Bones: Moderate cervical spine degenerative changes present most  conspicuous at C5-C6 and C6-C7.       Impression    IMPRESSION:  1. Tooth #30 periapical abscess with overlying inflammatory change  extending into the right neck, consistent with cellulitis. No evidence  of drainable soft tissue abscess.  2. Tooth #14 periapical abscess.  3. Right level Ib lymphadenopathy, likely reactive.    OLLIE CHURCHILL MD

## 2019-08-31 NOTE — ANESTHESIA POSTPROCEDURE EVALUATION
Patient: Radha Gomez    Procedure(s):  INCISION AND DRAINAGE, MANDIBLE ABSCESS  EXTRACTION, TOOTH #30 AND #28    Diagnosis:unknown  Diagnosis Additional Information: No value filed.    Anesthesia Type:  General, RSI, ETT    Note:  Anesthesia Post Evaluation    Patient location during evaluation: PACU  Patient participation: Able to fully participate in evaluation  Level of consciousness: awake  Pain management: adequate  Airway patency: patent  Cardiovascular status: acceptable  Respiratory status: acceptable  Hydration status: acceptable  PONV: controlled     Anesthetic complications: None          Last vitals:  Vitals:    08/30/19 2150 08/30/19 2154 08/30/19 2200   BP: (!) 154/86  (!) 147/99   Pulse: 76  68   Resp: 15 14 16   Temp: 37.1  C (98.8  F)  37.1  C (98.7  F)   SpO2: 95%  93%         Electronically Signed By: Griffin Irwin MD  August 30, 2019  10:13 PM

## 2019-09-01 VITALS
OXYGEN SATURATION: 97 % | WEIGHT: 238.4 LBS | TEMPERATURE: 98 F | BODY MASS INDEX: 42.91 KG/M2 | HEART RATE: 71 BPM | RESPIRATION RATE: 16 BRPM | SYSTOLIC BLOOD PRESSURE: 157 MMHG | DIASTOLIC BLOOD PRESSURE: 87 MMHG

## 2019-09-01 LAB — MAGNESIUM SERPL-MCNC: 1.5 MG/DL (ref 1.6–2.3)

## 2019-09-01 PROCEDURE — 99238 HOSP IP/OBS DSCHRG MGMT 30/<: CPT | Performed by: INTERNAL MEDICINE

## 2019-09-01 PROCEDURE — 36415 COLL VENOUS BLD VENIPUNCTURE: CPT | Performed by: INTERNAL MEDICINE

## 2019-09-01 PROCEDURE — 25000128 H RX IP 250 OP 636: Performed by: DENTIST

## 2019-09-01 PROCEDURE — 25000132 ZZH RX MED GY IP 250 OP 250 PS 637: Performed by: HOSPITALIST

## 2019-09-01 PROCEDURE — 83735 ASSAY OF MAGNESIUM: CPT | Performed by: INTERNAL MEDICINE

## 2019-09-01 PROCEDURE — 25000132 ZZH RX MED GY IP 250 OP 250 PS 637: Performed by: INTERNAL MEDICINE

## 2019-09-01 RX ADMIN — HYDROCODONE BITARTRATE AND ACETAMINOPHEN 2 TABLET: 5; 325 TABLET ORAL at 06:00

## 2019-09-01 RX ADMIN — Medication 1 SPRAY: at 06:01

## 2019-09-01 RX ADMIN — AMPICILLIN SODIUM AND SULBACTAM SODIUM 3 G: 2; 1 INJECTION, POWDER, FOR SOLUTION INTRAMUSCULAR; INTRAVENOUS at 00:20

## 2019-09-01 RX ADMIN — Medication 1 SPRAY: at 00:21

## 2019-09-01 RX ADMIN — AMPICILLIN SODIUM AND SULBACTAM SODIUM 3 G: 2; 1 INJECTION, POWDER, FOR SOLUTION INTRAMUSCULAR; INTRAVENOUS at 06:04

## 2019-09-01 RX ADMIN — CHLORHEXIDINE GLUCONATE 15 ML: 1.2 RINSE ORAL at 08:22

## 2019-09-01 RX ADMIN — HYDROCODONE BITARTRATE AND ACETAMINOPHEN 2 TABLET: 5; 325 TABLET ORAL at 10:03

## 2019-09-01 ASSESSMENT — ACTIVITIES OF DAILY LIVING (ADL)
ADLS_ACUITY_SCORE: 11

## 2019-09-01 NOTE — DISCHARGE SUMMARY
[facility]    Discharge Summary  Hospitalist    Date of Admission:  8/30/2019  Date of Discharge:  9/1/2019 10:14 AM  Discharging Provider: Tim Ellis MD    Discharge Diagnoses   Principal Problem:    Tooth abscess -- S/P 2 teeth extracted 8/30/19  Active Problems:    Bipolar 1 disorder (H)    HTN (hypertension)    Chronic pain -- on Methadone    DM 2 -- Hgb A1C 6.0 on 5/29/19      History of Present Illness   54 year old female who presents with tooth pain. Admitted for further evaluation and treatment. In the emergency department the patient presents with a creatinine of 1.12.  Liver function testing is normal.  White blood cell count is 8.2 with a hemoglobin of 11.0.Patient INR 0.91.  Patient with a CT of the soft tissue and neck completed without contrast showing tooth #30 with a periapical abscess, see report for further details, tooth 14 with a periapical abscess.  Patient reports swelling in jaw and tooth and cheek pain.  Patient states that she was at the dental specialist yesterday for diagnosis of dental abscess and aspiration performed, unsuccessfully.  Patient returns because of ongoing issues.  Patient states that she has swelling and pain despite the aspiration, seen at the dentist.  Patient states that she has been taking antibiotics but it was getting worse.  Patient states that she can open her mouth and breathe.  Patient takes narcotics at home for pain relief.    Hospital Course   Treated with IV antibiotics, seen by oral surgeon, Rachid Pérez, and had:  1. Extraction of teeth #28,30  2. Incision and drainage, right mandibular vestibule    Jaw swelling decreased and pain much improved, and will continue on Augmentin 875 mg bid x 10 days,  and follow-up with oral surgery as scheduled.     Tim Ellis MD, MD  Pager: [phone]  Cell Phone:  [phone]       Significant Results and Procedures   As above    Pending Results   These results will be followed  up by Dr. Whiet  Unresulted Labs Ordered in the Past 30 Days of this Admission     Date and Time Order Name Status Description    8/30/2019 2108 Fluid Culture Aerobic Bacterial Preliminary     8/30/2019 2108 Anaerobic bacterial culture Preliminary           Code Status   Full Code       Primary Care Physician   J Carlos Parsons    Physical Exam   Temp: 98  F (36.7  C) Temp src: Oral BP: (!) 157/87 Pulse: 71 Heart Rate: 70 Resp: 16 SpO2: 97 % O2 Device: None (Room air)    Vitals:    08/30/19 1945 08/31/19 0500 09/01/19 0552   Weight: 106.9 kg (235 lb 10.8 oz) 107.1 kg (236 lb 1.8 oz) 108.1 kg (238 lb 6.4 oz)     Vital Signs with Ranges  Temp:  [98  F (36.7  C)-98.4  F (36.9  C)] 98  F (36.7  C)  Pulse:  [71] 71  Heart Rate:  [55-74] 70  Resp:  [16-18] 16  BP: (106-157)/(47-87) 157/87  SpO2:  [92 %-97 %] 97 %  I/O last 3 completed shifts:  In: 2274 [P.O.:880; I.V.:1394]  Out: -     Exam on discharge:   Slight swelling right mandible area    Discharge Disposition   Discharged to home  Condition at discharge: Stable    Consultations This Hospital Stay   ORAL SURGERY IP CONSULT    Time Spent on this Encounter   I spent a total of 25 minutes discharging this patient.     Discharge Orders      Reason for your hospital stay    Tooth abscess     Follow-up and recommended labs and tests     Follow up with Dr. Rachid Pérez, phone [phone], address [address] [place] [address], oral surgeon, in 2 days.     Activity    Your activity upon discharge: activity as tolerated     Discharge Instructions    Call Dr. White if any medical questions at Cell Phone [phone].     Full Code     Diet    Follow this diet upon discharge: Orders Placed This Encounter      Mechanical/Dental Soft Diet -- then resume regular diet once tooth abscess resolved.     Discharge Medications   Current Discharge Medication List      START taking these medications    Details   amoxicillin-clavulanate (AUGMENTIN) 875-125 MG tablet Take 1 tablet  by mouth 2 times daily  Qty: 20 tablet, Refills: 0    Associated Diagnoses: Tooth abscess         CONTINUE these medications which have NOT CHANGED    Details   ALPRAZolam (XANAX PO) Take 0.5 mg by mouth daily as needed for anxiety       aspirin 81 MG EC tablet Take 1 tablet (81 mg) by mouth daily  Qty: 90 tablet, Refills: 3    Associated Diagnoses: CAD (coronary artery disease)      atorvastatin (LIPITOR) 20 MG tablet Take 1 tablet (20 mg) by mouth daily  Qty: 90 tablet, Refills: 0    Associated Diagnoses: Mixed hyperlipidemia      busPIRone HCl (BUSPAR) 30 MG tablet Take 30 mg by mouth 2 times daily      chlorhexidine (PERIDEX) 0.12 % solution Swish and spit 15 mLs in mouth 2 times daily  Qty: 300 mL, Refills: 0      dulaglutide (TRULICITY) 0.75 MG/0.5ML pen Inject 0.75 mg Subcutaneous every 7 days      glycopyrrolate (GLYCATE) 2 MG tablet Take 2 mg by mouth 3 times daily      HYDROcodone-acetaminophen (NORCO) 5-325 MG per tablet Take 2 tablets by mouth 3 times daily as needed for moderate to severe pain       IBUPROFEN PO Take 800 mg by mouth 2 times daily as needed for moderate pain       lurasidone (LATUDA) 60 MG TABS tablet Take 60 mg by mouth daily      metFORMIN (GLUCOPHAGE) 1000 MG tablet Take 1,000 mg by mouth 2 times daily (with meals)      METHADONE HCL PO Take 10 mg by mouth 2 times daily       OMEPRAZOLE PO Take 20 mg by mouth every morning OR ZANTAC      Phenazopyridine HCl (PYRIDIUM PO) Take 200 mg by mouth 3 times daily as needed for irritation      prazosin (MINIPRESS) 5 MG capsule Take 10 mg by mouth At Bedtime      propranolol ER (INDERAL LA) 80 MG 24 hr capsule Take 80 mg by mouth daily      QUEtiapine Fumarate (SEROQUEL PO) Take 300 mg by mouth every evening       ranitidine (ZANTAC) 150 MG tablet Take 150 mg by mouth 2 times daily OR OMEPRAZOLE      Sertraline HCl (ZOLOFT PO) Take 100 mg by mouth daily      venlafaxine (EFFEXOR-XR) 150 MG 24 hr capsule Take 300 mg by mouth daily         STOP  taking these medications       clindamycin (CLEOCIN) 300 MG capsule Comments:   Reason for Stopping:             Allergies   Allergies   Allergen Reactions     Abilify [Aripiprazole] Cramps     Total body- couldn't walk     Paxil [Paroxetine] Other (See Comments)     Total body pain     Contrast Dye Swelling     Ditropan [Oxybutynin Chloride]      Can't Urinate, Per Pt.     Wellbutrin [Bupropion] Palpitations     If not XL     Data   Most Recent 3 CBC's:  Recent Labs   Lab Test 08/31/19  0908 08/30/19  1638 04/09/16  1436   WBC 6.6 8.2 9.5   HGB 10.5* 11.0* 13.5   MCV 89 88 87    256 284      Most Recent 3 BMP's:  Recent Labs   Lab Test 08/31/19  0908 08/30/19 1638 04/09/16  1436    142 138   POTASSIUM 4.0 4.5 4.1   CHLORIDE 108 111* 107   CO2 27 25 24   BUN 9 17 10   CR 0.90 1.12* 0.81   ANIONGAP 5 6 7   MALACHI 8.3* 8.5 8.5   * 123* 207*     Most Recent 2 LFT's:  Recent Labs   Lab Test 08/30/19  1638 05/26/16  0900 04/21/15  1445   AST 16  --  42   ALT 24 20 56*   ALKPHOS 106  --  83   BILITOTAL 0.2  --  0.2     Most Recent INR's and Anticoagulation Dosing History:  Anticoagulation Dose History     Recent Dosing and Labs Latest Ref Rng & Units 4/15/2016 8/30/2019    INR 0.86 - 1.14 0.96 0.91        Most Recent 3 Troponin's:  Recent Labs   Lab Test 04/10/16  0030 04/09/16 2005 04/09/16  1436   TROPI <0.015  The 99th percentile for upper reference range is 0.045 ug/L.  Troponin values in   the range of 0.045 - 0.120 ug/L may be associated with risks of adverse   clinical events.   <0.015  The 99th percentile for upper reference range is 0.045 ug/L.  Troponin values in   the range of 0.045 - 0.120 ug/L may be associated with risks of adverse   clinical events.   <0.015  The 99th percentile for upper reference range is 0.045 ug/L.  Troponin values in   the range of 0.045 - 0.120 ug/L may be associated with risks of adverse   clinical events.       Most Recent Cholesterol Panel:  Recent Labs   Lab  Test 05/26/16  0900   CHOL 105   LDL 37   HDL 38*   TRIG 148     Most Recent 6 Bacteria Isolates From Any Culture (See EPIC Reports for Culture Details):  Recent Labs   Lab Test 08/30/19  2107 06/24/14  1205   CULT Light growth  beta hemolytic   Streptococcus constellatus  *  On day 2, isolated in broth only:  Candida glabrata  Susceptibility testing not routinely done  *  Moderate growth  Fusobacterium nucleatum  Susceptibility testing not routinely done  * 10,000 to 50,000 colonies/mL Mixed gram negative and positive bony  Multiple species present, probable perineal contamination.  Susceptibility testing not routinely done       Most Recent TSH, T4 and A1c Labs:  Recent Labs   Lab Test 04/09/16  1436 03/31/16  1225  06/24/14  1110   TSH  --  1.22   < > 1.21   T4  --   --   --  1.45   A1C 8.0*  --   --   --     < > = values in this interval not displayed.

## 2019-09-01 NOTE — PROGRESS NOTES
[facility] Progress Note    Radha Gomez  1965  [id number]    Today's Date: 09/01/19    S: POD #2 s/p I&D right mandibular vestibular abscess and removal of teeth #28,30. Pain controlled. Slight decrease in edema. No dysphagia or odynophagia. Afebrile. Denies nausea/vomiting. No hypoesthesia/paresthesia of the lip/chin.     O: Blood pressure 132/64, pulse 95, temperature 98.4  F (36.9  C), temperature source Oral, resp. rate 18, weight 108.1 kg (238 lb 6.4 oz), SpO2 92 %.  HEENT: RIght buccal and submandibular swelling. Interval decrease in right submandibular swelling. Tenderr to palpation. Right inferior mandibular border not palpable in area body; however, anteriorly, there has been interval decrease in edema. No overlying skin erythema. Mandibular opening ~45mm. Right  Mandibular vestibular penrose drain in place. Surgical sites hemostatic. Floor of mouth soft, non-tender, non-distended.   Neck: No lymphadnopathy  Neuro: CN V3 sensation intact to light touch along lip/chin.    Cultures: rare gm + cocci on gram stain; no growth to date with cultures    A/P: 54 year old female with a history of anxiety, chronic pain management (on methadone), bipolar disorder, CAD, HTN, PTSD POD #2 s/p I&D right mandibular vestibular abscess and extraction of teeth #28,30.     - Pain control, PO for discharge planning. Appreciate Hospitalist cares and management given chronic pain history.  - Continue IV unasyn while inpatient, augmentin on discharge. Will follow cultures and sensitivities.   - Airway stable  - Mechanical soft diet  - Penrose drain removed today.     No airway compromise throughout hospitalization.  Anticipate discharge today pending pain management.  Please contact [facility] with any questions or concerns.      Rachid Pérez DDS  [facility]  [address], [address]  [place] [address]  Clinic/On-call Phone: [phone]  Clinic Fax: [phone]

## 2019-09-01 NOTE — PLAN OF CARE
Cognitive Concerns/ Orientation : Alert and oriented x 4   BEHAVIOR & AGGRESSION TOOL COLOR: Green   CIWA SCORE: n/a   ABNL VS/O2: VSS 96% room air   MOBILITY: Independent   PAIN MANAGMENT: Complains of right facial pain/tooth, rates pain at 10, prn Norco, dilaudid and ice packs as needed.  Pain down to 6 after [place] and Dilaudid administration.   DIET: [facility] dental soft   BOWEL/BLADDER: Continent   ABNL LAB/BG: Mg 1.4, attempted x 2 to replace but patients complains of burning at IV site, new IV site placed and still complains of burning.  Received 1/4 of dose.  Will recheck Mg in am.    DRAIN/DEVICES: Peripheral IV right arm infusing NS at 75cc per hour   TELEMETRY RHYTHM: n/a   SKIN: Bruising   TESTS/PROCEDURES: n/a   D/C DAY/GOALS/PLACE: Possible discharge Sunday   OTHER IMPORTANT INFO: Swelling to right side of face, penrose drain in place where teeth (#28 and #30 ) were extracted, doing saline rinses edward 2 hours.

## 2019-09-01 NOTE — PLAN OF CARE
Discharge    Patient discharged to home via w/c with   Care plan note: Patient alert/orient X4, up independently in room.  Lungs clear, on RA, swelling improved.  Pain continues but getting some relief with norco.  Vss.  Tolerating diet.  Discharging to home today.    Listed belongings gathered and returned to patient. Yes  Care Plan and Patient education resolved: Yes  Prescriptions if needed, hard copies sent with patient  Yes  Home and hospital acquired medications returned to patient: NA  Medication Bin checked and emptied on discharge Yes  Follow up appointment made for patient: No, patient going to make it.

## 2019-09-03 LAB
BACTERIA SPEC CULT: ABNORMAL
BACTERIA SPEC CULT: ABNORMAL
Lab: ABNORMAL
SPECIMEN SOURCE: ABNORMAL

## 2019-09-06 LAB
BACTERIA SPEC CULT: ABNORMAL
BACTERIA SPEC CULT: ABNORMAL
Lab: ABNORMAL
SPECIMEN SOURCE: ABNORMAL

## 2020-07-25 ENCOUNTER — APPOINTMENT (OUTPATIENT)
Dept: CT IMAGING | Facility: CLINIC | Age: 55
DRG: 314 | End: 2020-07-25
Attending: EMERGENCY MEDICINE
Payer: COMMERCIAL

## 2020-07-25 ENCOUNTER — APPOINTMENT (OUTPATIENT)
Dept: ULTRASOUND IMAGING | Facility: CLINIC | Age: 55
DRG: 314 | End: 2020-07-25
Attending: EMERGENCY MEDICINE
Payer: COMMERCIAL

## 2020-07-25 ENCOUNTER — APPOINTMENT (OUTPATIENT)
Dept: NUCLEAR MEDICINE | Facility: CLINIC | Age: 55
DRG: 314 | End: 2020-07-25
Attending: EMERGENCY MEDICINE
Payer: COMMERCIAL

## 2020-07-25 ENCOUNTER — APPOINTMENT (OUTPATIENT)
Dept: ULTRASOUND IMAGING | Facility: CLINIC | Age: 55
DRG: 314 | End: 2020-07-25
Attending: INTERNAL MEDICINE
Payer: COMMERCIAL

## 2020-07-25 ENCOUNTER — HOSPITAL ENCOUNTER (INPATIENT)
Facility: CLINIC | Age: 55
LOS: 4 days | Discharge: HOME | DRG: 314 | End: 2020-07-29
Attending: EMERGENCY MEDICINE | Admitting: INTERNAL MEDICINE
Payer: COMMERCIAL

## 2020-07-25 DIAGNOSIS — R79.89 ELEVATED TROPONIN: ICD-10-CM

## 2020-07-25 DIAGNOSIS — J96.01 ACUTE RESPIRATORY FAILURE WITH HYPOXIA (H): ICD-10-CM

## 2020-07-25 DIAGNOSIS — R06.02 SHORTNESS OF BREATH: ICD-10-CM

## 2020-07-25 DIAGNOSIS — R79.89 ELEVATED BRAIN NATRIURETIC PEPTIDE (BNP) LEVEL: ICD-10-CM

## 2020-07-25 DIAGNOSIS — I50.30 HEART FAILURE WITH PRESERVED EJECTION FRACTION, NYHA CLASS II (H): Primary | ICD-10-CM

## 2020-07-25 DIAGNOSIS — N17.9 AKI (ACUTE KIDNEY INJURY) (H): ICD-10-CM

## 2020-07-25 DIAGNOSIS — R79.89 ELEVATED LFTS: ICD-10-CM

## 2020-07-25 LAB
ALBUMIN SERPL-MCNC: 2.6 G/DL (ref 3.4–5)
ALBUMIN UR-MCNC: 30 MG/DL
ALP SERPL-CCNC: 127 U/L (ref 40–150)
ALT SERPL W P-5'-P-CCNC: 1064 U/L (ref 0–50)
ANION GAP SERPL CALCULATED.3IONS-SCNC: 13 MMOL/L (ref 3–14)
APAP SERPL-MCNC: <2 MG/L (ref 10–20)
APPEARANCE UR: ABNORMAL
AST SERPL W P-5'-P-CCNC: 1950 U/L (ref 0–45)
BACTERIA #/AREA URNS HPF: ABNORMAL /HPF
BASE DEFICIT BLDV-SCNC: 14.3 MMOL/L
BASOPHILS # BLD AUTO: 0 10E9/L (ref 0–0.2)
BASOPHILS NFR BLD AUTO: 0.2 %
BILIRUB SERPL-MCNC: 0.3 MG/DL (ref 0.2–1.3)
BILIRUB UR QL STRIP: ABNORMAL
BUN SERPL-MCNC: 34 MG/DL (ref 7–30)
CALCIUM SERPL-MCNC: 8.1 MG/DL (ref 8.5–10.1)
CHLORIDE SERPL-SCNC: 106 MMOL/L (ref 94–109)
CK SERPL-CCNC: 104 U/L (ref 30–225)
CO2 SERPL-SCNC: 16 MMOL/L (ref 20–32)
COLOR UR AUTO: YELLOW
CREAT BLD-MCNC: 4.1 MG/DL (ref 0.52–1.04)
CREAT SERPL-MCNC: 3.79 MG/DL (ref 0.52–1.04)
CREAT UR-MCNC: 167 MG/DL
D DIMER PPP FEU-MCNC: 18.2 UG/ML FEU (ref 0–0.5)
DIFFERENTIAL METHOD BLD: ABNORMAL
EOSINOPHIL # BLD AUTO: 0 10E9/L (ref 0–0.7)
EOSINOPHIL NFR BLD AUTO: 0.1 %
ERYTHROCYTE [DISTWIDTH] IN BLOOD BY AUTOMATED COUNT: 15.5 % (ref 10–15)
FRACT EXCRET NA UR+SERPL-RTO: 0.6 %
GFR SERPL CREATININE-BSD FRML MDRD: 11 ML/MIN/{1.73_M2}
GFR SERPL CREATININE-BSD FRML MDRD: 13 ML/MIN/{1.73_M2}
GLUCOSE BLDC GLUCOMTR-MCNC: 132 MG/DL (ref 70–99)
GLUCOSE SERPL-MCNC: 140 MG/DL (ref 70–99)
GLUCOSE UR STRIP-MCNC: NEGATIVE MG/DL
HCO3 BLDV-SCNC: 14 MMOL/L (ref 21–28)
HCT VFR BLD AUTO: 33.1 % (ref 35–47)
HGB BLD-MCNC: 10.6 G/DL (ref 11.7–15.7)
HGB UR QL STRIP: NEGATIVE
HYALINE CASTS #/AREA URNS LPF: 47 /LPF (ref 0–2)
IMM GRANULOCYTES # BLD: 0.1 10E9/L (ref 0–0.4)
IMM GRANULOCYTES NFR BLD: 1.1 %
INR PPP: 1.29 (ref 0.86–1.14)
INTERPRETATION ECG - MUSE: NORMAL
INTERPRETATION ECG - MUSE: NORMAL
KETONES BLD-SCNC: 0.7 MMOL/L (ref 0–0.6)
KETONES UR STRIP-MCNC: 5 MG/DL
LABORATORY COMMENT REPORT: NORMAL
LACTATE BLD-SCNC: 2.7 MMOL/L (ref 0.7–2)
LACTATE BLD-SCNC: 4.9 MMOL/L (ref 0.7–2)
LEUKOCYTE ESTERASE UR QL STRIP: ABNORMAL
LYMPHOCYTES # BLD AUTO: 1.9 10E9/L (ref 0.8–5.3)
LYMPHOCYTES NFR BLD AUTO: 14.6 %
MCH RBC QN AUTO: 31.7 PG (ref 26.5–33)
MCHC RBC AUTO-ENTMCNC: 32 G/DL (ref 31.5–36.5)
MCV RBC AUTO: 99 FL (ref 78–100)
MONOCYTES # BLD AUTO: 0.7 10E9/L (ref 0–1.3)
MONOCYTES NFR BLD AUTO: 5 %
MUCOUS THREADS #/AREA URNS LPF: PRESENT /LPF
NEUTROPHILS # BLD AUTO: 10.6 10E9/L (ref 1.6–8.3)
NEUTROPHILS NFR BLD AUTO: 79 %
NITRATE UR QL: NEGATIVE
NRBC # BLD AUTO: 0.2 10*3/UL
NRBC BLD AUTO-RTO: 1 /100
NT-PROBNP SERPL-MCNC: ABNORMAL PG/ML (ref 0–900)
O2/TOTAL GAS SETTING VFR VENT: 2 %
OXYHGB MFR BLDV: 35 %
PCO2 BLDV: 44 MM HG (ref 40–50)
PH BLDV: 7.12 PH (ref 7.32–7.43)
PH UR STRIP: 5.5 PH (ref 5–7)
PLATELET # BLD AUTO: 321 10E9/L (ref 150–450)
PLATELET # BLD AUTO: 331 10E9/L (ref 150–450)
PO2 BLDV: 26 MM HG (ref 25–47)
POTASSIUM SERPL-SCNC: 4.5 MMOL/L (ref 3.4–5.3)
PROCALCITONIN SERPL-MCNC: 0.16 NG/ML
PROT SERPL-MCNC: 6.8 G/DL (ref 6.8–8.8)
RBC # BLD AUTO: 3.34 10E12/L (ref 3.8–5.2)
RBC #/AREA URNS AUTO: 3 /HPF (ref 0–2)
SARS-COV-2 RNA SPEC QL NAA+PROBE: NEGATIVE
SARS-COV-2 RNA SPEC QL NAA+PROBE: NORMAL
SODIUM SERPL-SCNC: 135 MMOL/L (ref 133–144)
SODIUM UR-SCNC: 35 MMOL/L
SOURCE: ABNORMAL
SP GR UR STRIP: 1.03 (ref 1–1.03)
SPECIMEN SOURCE: NORMAL
SPECIMEN SOURCE: NORMAL
SQUAMOUS #/AREA URNS AUTO: 3 /HPF (ref 0–1)
TROPONIN I SERPL-MCNC: 0.07 UG/L (ref 0–0.04)
TROPONIN I SERPL-MCNC: 0.07 UG/L (ref 0–0.04)
TROPONIN I SERPL-MCNC: 0.12 UG/L (ref 0–0.04)
UROBILINOGEN UR STRIP-MCNC: 4 MG/DL (ref 0–2)
WBC # BLD AUTO: 13.3 10E9/L (ref 4–11)
WBC #/AREA URNS AUTO: 38 /HPF (ref 0–5)
WBC CLUMPS #/AREA URNS HPF: PRESENT /HPF

## 2020-07-25 PROCEDURE — 34300033 ZZH RX 343: Performed by: EMERGENCY MEDICINE

## 2020-07-25 PROCEDURE — 82565 ASSAY OF CREATININE: CPT

## 2020-07-25 PROCEDURE — A9540 TC99M MAA: HCPCS | Performed by: EMERGENCY MEDICINE

## 2020-07-25 PROCEDURE — 86038 ANTINUCLEAR ANTIBODIES: CPT | Performed by: INTERNAL MEDICINE

## 2020-07-25 PROCEDURE — 25800030 ZZH RX IP 258 OP 636: Performed by: EMERGENCY MEDICINE

## 2020-07-25 PROCEDURE — 82550 ASSAY OF CK (CPK): CPT | Performed by: EMERGENCY MEDICINE

## 2020-07-25 PROCEDURE — 40000257 ZZH STATISTIC CONSULT NO CHARGE VASC ACCESS

## 2020-07-25 PROCEDURE — 83880 ASSAY OF NATRIURETIC PEPTIDE: CPT | Performed by: EMERGENCY MEDICINE

## 2020-07-25 PROCEDURE — 96376 TX/PRO/DX INJ SAME DRUG ADON: CPT

## 2020-07-25 PROCEDURE — 96375 TX/PRO/DX INJ NEW DRUG ADDON: CPT

## 2020-07-25 PROCEDURE — 84145 PROCALCITONIN (PCT): CPT | Performed by: EMERGENCY MEDICINE

## 2020-07-25 PROCEDURE — 87086 URINE CULTURE/COLONY COUNT: CPT | Performed by: EMERGENCY MEDICINE

## 2020-07-25 PROCEDURE — 86704 HEP B CORE ANTIBODY TOTAL: CPT | Performed by: INTERNAL MEDICINE

## 2020-07-25 PROCEDURE — 74176 CT ABD & PELVIS W/O CONTRAST: CPT

## 2020-07-25 PROCEDURE — 84484 ASSAY OF TROPONIN QUANT: CPT | Performed by: INTERNAL MEDICINE

## 2020-07-25 PROCEDURE — 25000128 H RX IP 250 OP 636: Performed by: EMERGENCY MEDICINE

## 2020-07-25 PROCEDURE — 96361 HYDRATE IV INFUSION ADD-ON: CPT

## 2020-07-25 PROCEDURE — 25000128 H RX IP 250 OP 636: Performed by: INTERNAL MEDICINE

## 2020-07-25 PROCEDURE — 70450 CT HEAD/BRAIN W/O DYE: CPT

## 2020-07-25 PROCEDURE — 93005 ELECTROCARDIOGRAM TRACING: CPT | Mod: 76

## 2020-07-25 PROCEDURE — 93005 ELECTROCARDIOGRAM TRACING: CPT

## 2020-07-25 PROCEDURE — 25000132 ZZH RX MED GY IP 250 OP 250 PS 637: Performed by: INTERNAL MEDICINE

## 2020-07-25 PROCEDURE — 81001 URINALYSIS AUTO W/SCOPE: CPT | Performed by: EMERGENCY MEDICINE

## 2020-07-25 PROCEDURE — 93970 EXTREMITY STUDY: CPT

## 2020-07-25 PROCEDURE — 87186 SC STD MICRODIL/AGAR DIL: CPT | Performed by: EMERGENCY MEDICINE

## 2020-07-25 PROCEDURE — 84300 ASSAY OF URINE SODIUM: CPT | Performed by: INTERNAL MEDICINE

## 2020-07-25 PROCEDURE — 83605 ASSAY OF LACTIC ACID: CPT | Performed by: EMERGENCY MEDICINE

## 2020-07-25 PROCEDURE — 99291 CRITICAL CARE FIRST HOUR: CPT | Performed by: INTERNAL MEDICINE

## 2020-07-25 PROCEDURE — 82570 ASSAY OF URINE CREATININE: CPT | Performed by: INTERNAL MEDICINE

## 2020-07-25 PROCEDURE — 85610 PROTHROMBIN TIME: CPT | Performed by: INTERNAL MEDICINE

## 2020-07-25 PROCEDURE — 80329 ANALGESICS NON-OPIOID 1 OR 2: CPT | Performed by: EMERGENCY MEDICINE

## 2020-07-25 PROCEDURE — 76705 ECHO EXAM OF ABDOMEN: CPT

## 2020-07-25 PROCEDURE — 76700 US EXAM ABDOM COMPLETE: CPT

## 2020-07-25 PROCEDURE — 87088 URINE BACTERIA CULTURE: CPT | Performed by: EMERGENCY MEDICINE

## 2020-07-25 PROCEDURE — 99291 CRITICAL CARE FIRST HOUR: CPT | Mod: 25

## 2020-07-25 PROCEDURE — 86803 HEPATITIS C AB TEST: CPT | Performed by: INTERNAL MEDICINE

## 2020-07-25 PROCEDURE — 85379 FIBRIN DEGRADATION QUANT: CPT | Performed by: EMERGENCY MEDICINE

## 2020-07-25 PROCEDURE — 86706 HEP B SURFACE ANTIBODY: CPT | Performed by: INTERNAL MEDICINE

## 2020-07-25 PROCEDURE — 00000146 ZZHCL STATISTIC GLUCOSE BY METER IP

## 2020-07-25 PROCEDURE — 36415 COLL VENOUS BLD VENIPUNCTURE: CPT | Performed by: INTERNAL MEDICINE

## 2020-07-25 PROCEDURE — 96365 THER/PROPH/DIAG IV INF INIT: CPT

## 2020-07-25 PROCEDURE — 87340 HEPATITIS B SURFACE AG IA: CPT | Performed by: INTERNAL MEDICINE

## 2020-07-25 PROCEDURE — 99292 CRITICAL CARE ADDL 30 MIN: CPT

## 2020-07-25 PROCEDURE — 78580 LUNG PERFUSION IMAGING: CPT

## 2020-07-25 PROCEDURE — 87040 BLOOD CULTURE FOR BACTERIA: CPT | Performed by: EMERGENCY MEDICINE

## 2020-07-25 PROCEDURE — 82010 KETONE BODYS QUAN: CPT | Performed by: EMERGENCY MEDICINE

## 2020-07-25 PROCEDURE — 85049 AUTOMATED PLATELET COUNT: CPT | Performed by: INTERNAL MEDICINE

## 2020-07-25 PROCEDURE — 82805 BLOOD GASES W/O2 SATURATION: CPT | Performed by: EMERGENCY MEDICINE

## 2020-07-25 PROCEDURE — 84484 ASSAY OF TROPONIN QUANT: CPT | Performed by: EMERGENCY MEDICINE

## 2020-07-25 PROCEDURE — 85025 COMPLETE CBC W/AUTO DIFF WBC: CPT | Performed by: EMERGENCY MEDICINE

## 2020-07-25 PROCEDURE — 80053 COMPREHEN METABOLIC PANEL: CPT | Performed by: EMERGENCY MEDICINE

## 2020-07-25 PROCEDURE — C9803 HOPD COVID-19 SPEC COLLECT: HCPCS

## 2020-07-25 PROCEDURE — U0003 INFECTIOUS AGENT DETECTION BY NUCLEIC ACID (DNA OR RNA); SEVERE ACUTE RESPIRATORY SYNDROME CORONAVIRUS 2 (SARS-COV-2) (CORONAVIRUS DISEASE [COVID-19]), AMPLIFIED PROBE TECHNIQUE, MAKING USE OF HIGH THROUGHPUT TECHNOLOGIES AS DESCRIBED BY CMS-2020-01-R: HCPCS | Performed by: EMERGENCY MEDICINE

## 2020-07-25 PROCEDURE — 20000003 ZZH R&B ICU

## 2020-07-25 RX ORDER — ONDANSETRON 4 MG/1
4 TABLET, ORALLY DISINTEGRATING ORAL EVERY 6 HOURS PRN
Status: DISCONTINUED | OUTPATIENT
Start: 2020-07-25 | End: 2020-07-29 | Stop reason: HOSPADM

## 2020-07-25 RX ORDER — NICOTINE 21 MG/24HR
1 PATCH, TRANSDERMAL 24 HOURS TRANSDERMAL DAILY
Status: DISCONTINUED | OUTPATIENT
Start: 2020-07-25 | End: 2020-07-29 | Stop reason: HOSPADM

## 2020-07-25 RX ORDER — PRAZOSIN HYDROCHLORIDE 5 MG/1
5 CAPSULE ORAL EVERY MORNING
Status: DISCONTINUED | OUTPATIENT
Start: 2020-07-26 | End: 2020-07-29 | Stop reason: HOSPADM

## 2020-07-25 RX ORDER — QUETIAPINE FUMARATE 300 MG/1
300 TABLET, FILM COATED ORAL EVERY EVENING
COMMUNITY

## 2020-07-25 RX ORDER — BUSPIRONE HYDROCHLORIDE 15 MG/1
30 TABLET ORAL 2 TIMES DAILY
Status: DISCONTINUED | OUTPATIENT
Start: 2020-07-27 | End: 2020-07-26

## 2020-07-25 RX ORDER — PHENAZOPYRIDINE HYDROCHLORIDE 200 MG/1
200 TABLET, FILM COATED ORAL 3 TIMES DAILY PRN
COMMUNITY

## 2020-07-25 RX ORDER — ALPRAZOLAM 0.5 MG
0.5 TABLET ORAL 2 TIMES DAILY PRN
Status: DISCONTINUED | OUTPATIENT
Start: 2020-07-25 | End: 2020-07-29 | Stop reason: HOSPADM

## 2020-07-25 RX ORDER — NALOXONE HYDROCHLORIDE 0.4 MG/ML
.1-.4 INJECTION, SOLUTION INTRAMUSCULAR; INTRAVENOUS; SUBCUTANEOUS
Status: DISCONTINUED | OUTPATIENT
Start: 2020-07-25 | End: 2020-07-29 | Stop reason: HOSPADM

## 2020-07-25 RX ORDER — HEPARIN SODIUM 5000 [USP'U]/.5ML
5000 INJECTION, SOLUTION INTRAVENOUS; SUBCUTANEOUS EVERY 8 HOURS
Status: DISCONTINUED | OUTPATIENT
Start: 2020-07-25 | End: 2020-07-26

## 2020-07-25 RX ORDER — ALPRAZOLAM 0.25 MG
2 TABLET ORAL DAILY PRN
COMMUNITY

## 2020-07-25 RX ORDER — PRAZOSIN HYDROCHLORIDE 5 MG/1
5 CAPSULE ORAL EVERY MORNING
COMMUNITY
End: 2020-11-02

## 2020-07-25 RX ORDER — PRAZOSIN HYDROCHLORIDE 5 MG/1
10 CAPSULE ORAL AT BEDTIME
Status: DISCONTINUED | OUTPATIENT
Start: 2020-07-25 | End: 2020-07-29 | Stop reason: HOSPADM

## 2020-07-25 RX ORDER — IBUPROFEN 800 MG/1
800 TABLET, FILM COATED ORAL DAILY PRN
Status: ON HOLD | COMMUNITY
End: 2020-07-29

## 2020-07-25 RX ORDER — ONDANSETRON 2 MG/ML
4 INJECTION INTRAMUSCULAR; INTRAVENOUS EVERY 6 HOURS PRN
Status: DISCONTINUED | OUTPATIENT
Start: 2020-07-25 | End: 2020-07-29 | Stop reason: HOSPADM

## 2020-07-25 RX ORDER — AMOXICILLIN 250 MG
2 CAPSULE ORAL 2 TIMES DAILY PRN
Status: DISCONTINUED | OUTPATIENT
Start: 2020-07-25 | End: 2020-07-29 | Stop reason: HOSPADM

## 2020-07-25 RX ORDER — POLYETHYLENE GLYCOL 3350 17 G/17G
17 POWDER, FOR SOLUTION ORAL DAILY PRN
Status: DISCONTINUED | OUTPATIENT
Start: 2020-07-25 | End: 2020-07-29 | Stop reason: HOSPADM

## 2020-07-25 RX ORDER — AMOXICILLIN 250 MG
1 CAPSULE ORAL 2 TIMES DAILY PRN
Status: DISCONTINUED | OUTPATIENT
Start: 2020-07-25 | End: 2020-07-29 | Stop reason: HOSPADM

## 2020-07-25 RX ORDER — PIPERACILLIN SODIUM, TAZOBACTAM SODIUM 3; .375 G/15ML; G/15ML
3.38 INJECTION, POWDER, LYOPHILIZED, FOR SOLUTION INTRAVENOUS ONCE
Status: COMPLETED | OUTPATIENT
Start: 2020-07-25 | End: 2020-07-25

## 2020-07-25 RX ORDER — IBUPROFEN 800 MG/1
800 TABLET, FILM COATED ORAL EVERY MORNING
Status: ON HOLD | COMMUNITY
End: 2020-07-29

## 2020-07-25 RX ORDER — METHADONE HYDROCHLORIDE 10 MG/1
10 TABLET ORAL 2 TIMES DAILY
COMMUNITY

## 2020-07-25 RX ORDER — METHADONE HYDROCHLORIDE 10 MG/1
10 TABLET ORAL 2 TIMES DAILY
Status: DISCONTINUED | OUTPATIENT
Start: 2020-07-25 | End: 2020-07-29 | Stop reason: HOSPADM

## 2020-07-25 RX ORDER — ATORVASTATIN CALCIUM 40 MG/1
40 TABLET, FILM COATED ORAL DAILY
Status: ON HOLD | COMMUNITY
End: 2020-07-29

## 2020-07-25 RX ORDER — QUETIAPINE FUMARATE 100 MG/1
300 TABLET, FILM COATED ORAL AT BEDTIME
Status: DISCONTINUED | OUTPATIENT
Start: 2020-07-25 | End: 2020-07-29 | Stop reason: HOSPADM

## 2020-07-25 RX ORDER — METHADONE HYDROCHLORIDE 10 MG/1
10 TABLET ORAL 2 TIMES DAILY
Status: DISCONTINUED | OUTPATIENT
Start: 2020-07-25 | End: 2020-07-25

## 2020-07-25 RX ORDER — HYDROCODONE BITARTRATE AND ACETAMINOPHEN 5; 325 MG/1; MG/1
2 TABLET ORAL 3 TIMES DAILY PRN
Status: DISCONTINUED | OUTPATIENT
Start: 2020-07-25 | End: 2020-07-29 | Stop reason: HOSPADM

## 2020-07-25 RX ORDER — GLYCOPYRROLATE 2 MG/1
2 TABLET ORAL EVERY EVENING
COMMUNITY
End: 2020-07-31

## 2020-07-25 RX ORDER — PIPERACILLIN SODIUM, TAZOBACTAM SODIUM 2; .25 G/10ML; G/10ML
2.25 INJECTION, POWDER, LYOPHILIZED, FOR SOLUTION INTRAVENOUS EVERY 6 HOURS
Status: DISCONTINUED | OUTPATIENT
Start: 2020-07-25 | End: 2020-07-27

## 2020-07-25 RX ADMIN — Medication 2000 MG: at 16:41

## 2020-07-25 RX ADMIN — PRAZOSIN HYDROCHLORIDE 10 MG: 5 CAPSULE ORAL at 22:30

## 2020-07-25 RX ADMIN — Medication 2000 MG: at 14:25

## 2020-07-25 RX ADMIN — KIT FOR THE PREPARATION OF TECHNETIUM TC 99M ALBUMIN AGGREGATED 3.6 MILLICURIE: 2.5 INJECTION, POWDER, FOR SOLUTION INTRAVENOUS at 16:15

## 2020-07-25 RX ADMIN — METHADONE HYDROCHLORIDE 10 MG: 10 TABLET ORAL at 22:30

## 2020-07-25 RX ADMIN — QUETIAPINE 300 MG: 300 TABLET, FILM COATED ORAL at 22:30

## 2020-07-25 RX ADMIN — HEPARIN SODIUM 5000 UNITS: 5000 INJECTION, SOLUTION INTRAVENOUS; SUBCUTANEOUS at 22:30

## 2020-07-25 RX ADMIN — PIPERACILLIN SODIUM AND TAZOBACTAM SODIUM 2.25 G: 2; .25 INJECTION, POWDER, LYOPHILIZED, FOR SOLUTION INTRAVENOUS at 19:59

## 2020-07-25 RX ADMIN — PIPERACILLIN SODIUM AND TAZOBACTAM SODIUM 3.38 G: 3; .375 INJECTION, POWDER, LYOPHILIZED, FOR SOLUTION INTRAVENOUS at 13:34

## 2020-07-25 RX ADMIN — SODIUM CHLORIDE 1000 ML: 9 INJECTION, SOLUTION INTRAVENOUS at 12:20

## 2020-07-25 ASSESSMENT — ACTIVITIES OF DAILY LIVING (ADL): ADLS_ACUITY_SCORE: 12

## 2020-07-25 ASSESSMENT — MIFFLIN-ST. JEOR
SCORE: 1556.13
SCORE: 1498.08

## 2020-07-25 NOTE — PHARMACY-ADMISSION MEDICATION HISTORY
Pharmacy Medication History  Admission medication history interview status for the 7/25/2020  admission is complete. See EPIC admission navigator for prior to admission medications     Medication history sources: Patient and Care Everywhere  Medication history source reliability: Moderate-Good  Adherence assessment: Unable to assess    Significant changes made to the medication list:  -changed alprazolam to patient reported: 0.25mg x 6 tablets once daily when needed  -changed atorvastatin from 20mg to 40mg  -changed glycopyrrolate from 2mg tid to 4mg QAM & 2mg QPM  -changed prazosin from 10mg at bedtime to 5mg QAM & 10mg QPM    Additional medication history information:   -She reported buspirone increased from 30mg bid to 40mg bid in past week in effort to decrease alprazolam; not able to confirm dose increase recommendation in documentation.  -She takes ibuprofen 800mg QAM and extra 800mg daily if needed - she has been taking extra 800mg in past week.  -She is currently taking omeprazole as she ran out of ranitidine.  -She rarely takes phenazopyridine.  -She recently took doxycycline for about 2 weeks, plan was 3 weeks but she stopped about 1 week early about 1 week ago because making her sick.    Medication reconciliation completed by provider prior to medication history? Yes    Time spent in this activity: 30 min      Prior to Admission medications    Medication Sig Last Dose Taking? Auth Provider   ALPRAZolam (XANAX) 0.25 MG tablet Take 1.5 mg by mouth daily as needed for anxiety 0.25 mg x 6 tablets Past Week at Unknown time Yes Unknown, Entered By History   aspirin 81 MG EC tablet Take 1 tablet (81 mg) by mouth daily 7/25/2020 at am Yes March, Dolorse Valles MD   atorvastatin (LIPITOR) 40 MG tablet Take 40 mg by mouth daily 7/25/2020 at am Yes Unknown, Entered By History   busPIRone HCl (BUSPAR) 30 MG tablet Take by mouth 2 times daily. She reports dose inc from 30mg bid to 40mg bid in past week. 7/25/2020 at  am Yes Unknown, Entered By History   dulaglutide (TRULICITY) 0.75 MG/0.5ML pen Inject 0.75 mg Subcutaneous every 7 days 7/24/2020 Yes Unknown, Entered By History   glycopyrrolate (GLYCATE) 2 MG tablet Take 2 mg by mouth every evening 7/24/2020 at am Yes Unknown, Entered By History   glycopyrrolate (GLYCATE) 2 MG tablet Take 4 mg by mouth every morning 2 x 2mg 7/25/2020 at am Yes Unknown, Entered By History   HYDROcodone-acetaminophen (NORCO) 5-325 MG per tablet Take 2 tablets by mouth 3 times daily as needed for moderate to severe pain  7/25/2020 at am Yes Reported, Patient   ibuprofen (ADVIL/MOTRIN) 800 MG tablet Take 800 mg by mouth every morning 7/25/2020 at am Yes Unknown, Entered By History   ibuprofen (ADVIL/MOTRIN) 800 MG tablet Take 800 mg by mouth daily as needed for moderate pain Past Week at am Yes Unknown, Entered By History   lurasidone (LATUDA) 60 MG TABS tablet Take 60 mg by mouth every evening  7/24/2020 at pm Yes Unknown, Entered By History   metFORMIN (GLUCOPHAGE) 1000 MG tablet Take 1,000 mg by mouth 2 times daily (with meals) 7/25/2020 at am Yes Reported, Patient   methadone (DOLOPHINE) 10 MG tablet Take 10 mg by mouth 2 times daily 7/25/2020 at am Yes Unknown, Entered By History   omeprazole (PRILOSEC) 20 MG DR capsule Take 20 mg by mouth daily OR ZANTAC 7/25/2020 at am Yes Unknown, Entered By History   prazosin (MINIPRESS) 5 MG capsule Take 5 mg by mouth every morning 7/25/2020 at am Yes Unknown, Entered By History   prazosin (MINIPRESS) 5 MG capsule Take 10 mg by mouth At Bedtime 7/24/2020 at pm Yes Unknown, Entered By History   propranolol ER (INDERAL LA) 80 MG 24 hr capsule Take 80 mg by mouth daily 7/25/2020 at am Yes Unknown, Entered By History   QUEtiapine (SEROQUEL) 300 MG tablet Take 300 mg by mouth every evening 7/24/2020 at pm Yes Unknown, Entered By History   venlafaxine (EFFEXOR-XR) 150 MG 24 hr capsule Take 300 mg by mouth daily 7/25/2020 at am Yes Unknown, Entered By History    phenazopyridine (PYRIDIUM) 200 MG tablet Take 200 mg by mouth 3 times daily as needed for irritation More than a month at Unknown time  Unknown, Entered By History   ranitidine (ZANTAC) 150 MG tablet Take 150 mg by mouth 2 times daily OR OMEPRAZOLE Not taking  Unknown, Entered By History

## 2020-07-25 NOTE — ED NOTES
Pt reports that she feels much better now then when she arrived. Pt states she feels like she can breathe better at rest although SOB reoccurs when she tries to move or ambulate. Pt also continues to c/o some dizziness.

## 2020-07-25 NOTE — PROGRESS NOTES
Called to place picc line for access.  Obtained consent and evaluated patients arms for access.  Patient noted to have vessels on both arms that are measuring too small for triple lumen picc placement.    Discussed with Dr. Engel and recommended possible jugular line placement versus picc line.

## 2020-07-25 NOTE — ED NOTES
[facility]  ED Nurse Handoff Report    ED Chief complaint: Shortness of Breath and Dizziness      ED Diagnosis:   Final diagnoses:   Acute respiratory failure with hypoxia (H)   Elevated LFTs   Elevated brain natriuretic peptide (BNP) level   Elevated troponin   APOLLO (acute kidney injury) (H)       Code Status: Full Code    Allergies:   Allergies   Allergen Reactions     Abilify [Aripiprazole] Cramps     Total body- couldn't walk     Paxil [Paroxetine] Other (See Comments)     Total body pain     Contrast Dye Swelling     Ditropan [Oxybutynin Chloride]      Can't Urinate, Per Pt.     Wellbutrin [Bupropion] Palpitations     If not XL       Patient Story: Pt C/O SOB, cough and dizziness  Focused Assessment:  Pt hypotensive after being here a short time. SOB with activity. C/O dizziness    Treatments and/or interventions provided: IV fluid bolus, antibiotics. Unable to picc line due to limited access  Patient's response to treatments and/or interventions: BP improvement    To be done/followed up on inpatient unit:  Will do VQ scan. Re evaluate sepsis and renal failure    Does this patient have any cognitive concerns?: no    Activity level - Baseline/Home:  Independent  Activity Level - Current:   Stand with Assist    Patient's Preferred language: English   Needed?: No    Isolation: None and Other: covid precautions  Infection: Not Applicable  Bariatric?: No    Vital Signs:   Vitals:    07/25/20 1400 07/25/20 1415 07/25/20 1430 07/25/20 1500   BP: 97/60 101/63 98/67 108/71   Pulse: 71 71 71 72   Resp: 17 13 20 24   Temp:       TempSrc:       SpO2: 99% 100% (!) 86% 96%   Weight:       Height:           Cardiac Rhythm:Cardiac Rhythm: Normal sinus rhythm    Was the PSS-3 completed:   Yes  What interventions are required if any?               Family Comments: no family is here  OBS brochure/video discussed/provided to patient/family: N/A              Name of person given brochure if not patient:  na              Relationship to patient: na    For the majority of the shift this patient's behavior was Green.   Behavioral interventions performed were na.    ED NURSE PHONE NUMBER: [phone]

## 2020-07-25 NOTE — H&P
Critical Care  Note      07/25/2020    Name: Radha Gomez MRN#: [id number]   Age: 55 year old YOB: 1965     Hsptl Day# 0  ICU DAY #1    MV DAY #NA             Problem List:     PTSD  Bipolar disorder  Chronic Pain  Anxiety  Tobacco use  Dyspnea on exertion  Elevated transaminases  Acute kidney injury  DM 2            HPI and ED course:     CC: multiple lab abnormalities, shortness of breath, fatigue    HPI:   Ms. Radha Gomez is a 55 year old female with a history of DM, HTN, CAD, Bipolar disorder, and chronic methadone use who presented to the [facility] ER today with symptoms of shortness of breath and dizziness.  Of note, the patient recently completed treatment with doxycycline for a known tick exposure.     On interview this evening, the patient reports that she has been feeling generally unwell for the past 1 month.  She endorses having a tick bite at the beginning of the month, and started treatment with doxycycline at that time.  While she was getting treated with doxycycline, she endorsed worsening feelings of abdominal pain, nausea, and vomiting.  She reports she stopped the doxycycline in the past week and last Saturday felt well.  Overall in the past month, she endorses reduced PO intake and has lost 17lbs.     She reports that 2 days ago, she reports worsening dyspnea on exertion, which is new for her; she had a difficult time even ambulating to the bathroom, and generally she is not at all limited in any of her daily activities. She endorses a cough which she attributes to smoking.  She does not report wheezing. She denies any associated fevers, sweats, or chills. She has had no sick contacts.  She has not travelled recently.  She endorses headaches and has been taking her 800mg ibuprofen tabs at least twice a day for the past several days.      In the ER, the patient was found to have multiple lab abnormalities including an elevated lactate (5), markedly elevated transaminases (ALT  1064 and AST 1950), markedly elevated BNP to 27,000, and acute kidney injury with a creatine of 4.  CT Chest/Abdomen demonstrated diffuse patchy ground glass opacities, bilateral pleural effusions, and no obvious abdominal abnormalities.  Her D-dimer was also elevated to 18 and she underwent a V/Q scan which was negative for PE.  She was given 1L of NS and a dose of zosyn, and pan cultures were obtained.  Due to multiple lab abnormalities, the patient was admitted to the ICU for further evaluation.       Assessment and plan :     Radha Gomez IS a 55 year old female admitted on 7/25/2020 for multiple lab abnormalities, notably acute kidney injury (Cr 4.0), markedly elevated LFTs, and markedly elevated Nt-pro BNP.      I have personally reviewed the daily labs, imaging studies, cultures and discussed the case with referring physician and consulting physicians.     My assessment and plan by system for this patient is as follows:    Neurology/Psychiatry:   Bipolar disorder  Chronic pain  Tobacco abuse, 1ppd    Home medications:   xanax 0.5 Qday PRN  Buspar 30mg BID  Glycopyrrolate  Norco  Lurasidone 60mg daily   Methadone 10mg daily  Prazosin 10mg HS  Seroquel 300mg HS  Venlafaxine 300mg daily     In a brief med review, I am concerned that several of her above medications may be exacerbating her current issues (LFTs and renal failure), specifically her lurasidone, sertraline (possible increase in LFTs), and venlafaxine.      Plan:   - I will continue her xanax (daily as needed), buspar, methadone, prazosin, and seroquel - no known issues with worsening of renal failure, elevation of LFTs  - holding lurasidone, sertraline, and venlafaxine for now  - continue methadone/norco  - nicotine patch  - psychiatry consult to aid in med management in the setting of multiple lab abnormalities; I am worried about precipitating a manic episode.    Cardiovascular:   Non obstructive CAD ([facility] 2016)  Elevated Nt-Pro BNP, no recent  ECHO   Elevated troponin, no EKG changes    Plan:   - stat ECHO  - trend troponin  - hold diuretics pending further eval, could trial 20 of lasix if breathing worsens overnight      Pulmonary/Ventilator Management:   Dyspnea on exertion/Acute hypoxic respiratory failure: 2/2 likely volume overload in the setting of renal failure, possible heart failure.  Denies orthopnea.  Currently comfortable on 3L NC    GI and Nutrition :   Marked elevation of LFTs: tylenol level negative, no obvious drug toxicity.  Question possible ?decreased PO intake over the past 4 month, possible orthostatic hypotension contributing, in addition to persistent effects from her very complicated medication regimen? CK level normal. On an up to date review, statins, NSAIDS, ASA are possible culprits.     Plan:   - complete abdominal ultrasound with dopplers  - hepatitis studies, utox, autoimmune workup for the AM     Renal/Fluids/Electrolytes:   1. APOLLO - yes, in the setting of increased ibuprofen use over the past few weeks, decreased PO intake  2. Electrolyte abnormality- no  3. Acid/base status- improving, metabolic acidosis 2/2 elevated lactate which has improved  4. Volume status- appears euvolemic to hypovolemic    Plan  - add on FENA to UA from admission, trend creatinine  - monitor function and electrolytes as needed with replacement per ICU protocols. - generally avoid nephrotoxic agents such as NSAID, IV contrast unless specifically required  - adjust medications as needed for renal clearance  - follow I/O's as appropriate.    Infectious Disease:   History of tick bite, negative lyme antibody, s/p empiric treatment with doxycycline (partial?)  COVID ruled out  - no obvious s/sx of infections.  Procal 0.16.  One dose of zosyn given in the ED.  Continue x48 hours pending cultures.     Endocrine:   H/O DM 2 on dulaglutide (injected yesterday), metformin    Plan:   - hold trulicity, metformin  - ICU insulin protocol, goal sugar  <180      Hematology/Oncology:   No obvious issues     IV/Access:   1. Venous access - PIV  2. Arterial access - not required        ICU Prophylaxis:   1. DVT: Hep Subq  2. VAP: NA, not vented  3. Stress Ulcer: continue home omeprazole  4. Restraints :not needed  5. Wound care : NA  6. Feeding - clear liquids after ultrasound  7. Family Update: patient updated at bedside  8. Disposition - ICU tonight, if labs improve in the AM can transfer to hospitalist team        Key goals for next 24 hours:   1. Abdominal ultrasound, renal ultrasound, ECHO  2. Trend troponins  3. Resume clear liquids after ultrasound        Medical History:     Past Medical History:   Diagnosis Date     Anxiety      Arthritis      Back injury      Bipolar 1 disorder (H)      Borderline personality disorder (H)      Chest pain      Coronary artery disease     non-obstructive, per cath 4/2016     Depressive disorder      HLD (hyperlipidemia)      HTN (hypertension)     mild     IC (interstitial cystitis) age 21    feels like a  bladder infection- very painful; symptoms since 15     Osteoarthritis      Palpitations      PTSD (post-traumatic stress disorder)      Past Surgical History:   Procedure Laterality Date     ABDOMEN SURGERY      3 laproscopies     BIOPSY      bladder     COLONOSCOPY  age 47     ESOPHAGOSCOPY, GASTROSCOPY, DUODENOSCOPY (EGD), COMBINED  6/24/2013    Procedure: COMBINED ESOPHAGOSCOPY, GASTROSCOPY, DUODENOSCOPY (EGD), BIOPSY SINGLE OR MULTIPLE;  gastroscopy;  Surgeon: Nathalie Cotter MD;  Location:  GI     EXTRACTION(S) DENTAL N/A 8/30/2019    Procedure: EXTRACTION, TOOTH #30 AND #28;  Surgeon: Rachid Pérez DDS;  Location:  OR     GYN SURGERY      venereal warts removed     HC LEFT HEART CATHETERIZATION  4/15/16    non-obstructive CAD     INCISION AND DRAINAGE MANDIBLE, COMBINED N/A 8/30/2019    Procedure: INCISION AND DRAINAGE, MANDIBLE ABSCESS;  Surgeon: Rachid Pérez DDS;  Location:  OR      LAPAROSCOPIC CHOLECYSTECTOMY  2013    Procedure: LAPAROSCOPIC CHOLECYSTECTOMY;  LAPAROSCOPIC CHOLECYSTECTOMY.;  Surgeon: Salomón Wall MD;  Location: [facility] OR     laporoscopy       ORTHOPEDIC SURGERY       Social History     Socioeconomic History     Marital status:      Spouse name: Not on file     Number of children: Not on file     Years of education: Not on file     Highest education level: Not on file   Occupational History     Not on file   Social Needs     Financial resource strain: Not on file     Food insecurity     Worry: Not on file     Inability: Not on file     Transportation needs     Medical: Not on file     Non-medical: Not on file   Tobacco Use     Smoking status: Former Smoker     Packs/day: 1.00     Years: 35.00     Pack years: 35.00     Types: Cigarettes     Last attempt to quit: 2012     Years since quittin.0     Smokeless tobacco: Never Used   Substance and Sexual Activity     Alcohol use: No     Drug use: No     Sexual activity: Yes     Partners: Male     Birth control/protection: Surgical   Lifestyle     Physical activity     Days per week: Not on file     Minutes per session: Not on file     Stress: Not on file   Relationships     Social connections     Talks on phone: Not on file     Gets together: Not on file     Attends Gnosticism service: Not on file     Active member of club or organization: Not on file     Attends meetings of clubs or organizations: Not on file     Relationship status: Not on file     Intimate partner violence     Fear of current or ex partner: Not on file     Emotionally abused: Not on file     Physically abused: Not on file     Forced sexual activity: Not on file   Other Topics Concern     Parent/sibling w/ CABG, MI or angioplasty before 65F 55M? Not Asked      Service Not Asked     Blood Transfusions Not Asked     Caffeine Concern No     Comment: minimal.      Occupational Exposure Not Asked     Hobby Hazards Not Asked     Sleep  Concern Not Asked     Stress Concern Not Asked     Weight Concern Not Asked     Special Diet Not Asked     Back Care Not Asked     Exercise No     Bike Helmet Not Asked     Seat Belt Not Asked     Self-Exams Not Asked   Social History Narrative     Not on file        Allergies   Allergen Reactions     Abilify [Aripiprazole] Cramps     Total body- couldn't walk     Paxil [Paroxetine] Other (See Comments)     Total body pain     Contrast Dye Swelling     Ditropan [Oxybutynin Chloride]      Can't Urinate, Per Pt.     Wellbutrin [Bupropion] Palpitations     If not XL              Key Medications:       sodium chloride (PF)  10 mL Intracatheter Q8H     [COMPLETED] vancomycin (VANCOCIN) IV  2,000 mg Intravenous Once          Home Meds  No current facility-administered medications on file prior to encounter.   ALPRAZolam (XANAX PO), Take 0.5 mg by mouth daily as needed for anxiety   amoxicillin-clavulanate (AUGMENTIN) 875-125 MG tablet, Take 1 tablet by mouth 2 times daily  aspirin 81 MG EC tablet, Take 1 tablet (81 mg) by mouth daily  atorvastatin (LIPITOR) 20 MG tablet, Take 1 tablet (20 mg) by mouth daily  busPIRone HCl (BUSPAR) 30 MG tablet, Take 30 mg by mouth 2 times daily  chlorhexidine (PERIDEX) 0.12 % solution, Swish and spit 15 mLs in mouth 2 times daily  dulaglutide (TRULICITY) 0.75 MG/0.5ML pen, Inject 0.75 mg Subcutaneous every 7 days  glycopyrrolate (GLYCATE) 2 MG tablet, Take 2 mg by mouth 3 times daily  HYDROcodone-acetaminophen (NORCO) 5-325 MG per tablet, Take 2 tablets by mouth 3 times daily as needed for moderate to severe pain   IBUPROFEN PO, Take 800 mg by mouth 2 times daily as needed for moderate pain   lurasidone (LATUDA) 60 MG TABS tablet, Take 60 mg by mouth daily  metFORMIN (GLUCOPHAGE) 1000 MG tablet, Take 1,000 mg by mouth 2 times daily (with meals)  METHADONE HCL PO, Take 10 mg by mouth 2 times daily   OMEPRAZOLE PO, Take 20 mg by mouth every morning OR ZANTAC  Phenazopyridine HCl (PYRIDIUM  PO), Take 200 mg by mouth 3 times daily as needed for irritation  prazosin (MINIPRESS) 5 MG capsule, Take 10 mg by mouth At Bedtime  propranolol ER (INDERAL LA) 80 MG 24 hr capsule, Take 80 mg by mouth daily  QUEtiapine Fumarate (SEROQUEL PO), Take 300 mg by mouth every evening   ranitidine (ZANTAC) 150 MG tablet, Take 150 mg by mouth 2 times daily OR OMEPRAZOLE  Sertraline HCl (ZOLOFT PO), Take 100 mg by mouth daily  venlafaxine (EFFEXOR-XR) 150 MG 24 hr capsule, Take 300 mg by mouth daily               Physical Examination:   Temp:  [97.8  F (36.6  C)] 97.8  F (36.6  C)  Pulse:  [67-78] 69  Heart Rate:  [69-73] 69  Resp:  [11-26] 18  BP: ()/(56-73) 104/67  SpO2:  [85 %-100 %] 95 %    Intake/Output Summary (Last 24 hours) at 7/25/2020 1716  Last data filed at 7/25/2020 1320  Gross per 24 hour   Intake 1000 ml   Output --   Net 1000 ml     Wt Readings from Last 4 Encounters:   07/25/20 93.9 kg (207 lb)   09/01/19 108.1 kg (238 lb 6.4 oz)   08/29/19 104.3 kg (230 lb)   05/26/16 111.1 kg (245 lb)     BP - Mean:  [64-87] 87  Resp: 18    Recent Labs   Lab 07/25/20  1235   O2PER 2       GEN: no acute distress, AOx4  HEENT: head ncat, sclera anicteric, OP patent, trachea midline   PULM: breathing comfortably on nasal cannula.    CV/COR: RRR S1S2 no gallop,  No rub, no murmur  ABD: soft nontender, hypoactive bowel sounds, no mass  EXT:  No edema; warm and well perfused  NEURO: grossly intact.  Moving all 4 extremities without difficulty  SKIN: no obvious rash  LINES: clean, dry intact         Data:   All data and imaging reviewed     ROUTINE ICU LABS (Last four results)  CMP  Recent Labs   Lab 07/25/20  1241 07/25/20  1215   NA  --  135   POTASSIUM  --  4.5   CHLORIDE  --  106   CO2  --  16*   ANIONGAP  --  13   GLC  --  140*   BUN  --  34*   CR  --  3.79*   GFRESTIMATED 11* 13*   GFRESTBLACK 14* 15*   MALACHI  --  8.1*   PROTTOTAL  --  6.8   ALBUMIN  --  2.6*   BILITOTAL  --  0.3   ALKPHOS  --  127   AST  --  1,950*    ALT  --  1,064*     CBC  Recent Labs   Lab 07/25/20  1215   WBC 13.3*   RBC 3.34*   HGB 10.6*   HCT 33.1*   MCV 99   MCH 31.7   MCHC 32.0   RDW 15.5*        INRNo lab results found in last 7 days.  Arterial Blood Gas  Recent Labs   Lab 07/25/20  1235   O2PER 2       All cultures:  Recent Labs   Lab 07/25/20  1311 07/25/20  1235   CULT No growth after 1 hour No growth after 2 hours     Recent Results (from the past 24 hour(s))   Head CT w/o contrast    Narrative    CT SCAN OF THE HEAD WITHOUT CONTRAST   7/25/2020 3:04 PM     HISTORY: Fall. Pain.    TECHNIQUE:  Axial images of the head and coronal reformations without  IV contrast material. Radiation dose for this scan was reduced using  automated exposure control, adjustment of the mA and/or kV according  to patient size, or iterative reconstruction technique.    COMPARISON: 22 December 2014 head CT.    FINDINGS: 10 mm calcified lesion arising from the left side of the  anterior falx is redemonstrated. Extra-axial. No change since the  previous exam and typical appearance for meningioma. Minimal localized  mass effect without midline shift or brain edema. Mild, generalized  atrophy and mild chronic small vessel vascular change. The visualized  portions of the sinuses and mastoids appear normal. The bony calvarium  and bones of the skull base appear intact.       Impression    IMPRESSION: Negative for acute intracranial process. No fractures.  Small, stable left frontal parafalcine meningioma redemonstrated.    PORFIRIO GUERRERO MD   US Lower Extremity Venous Duplex Bilateral    Narrative    US LOWER EXTREMITY VENOUS DUPLEX BILATERAL 7/25/2020 3:19 PM    CLINICAL HISTORY/INDICATION: Bilat LE swelling, eval for DVT, elevated  D-dimer, hypotension    COMPARISON: 4/19/2020    TECHNIQUE:   Grayscale, color-flow, and spectral waveform analysis were performed  of the deep veins of the bilateral lower extremities    FINDINGS:   The right common femoral vein,  femoral vein, popliteal vein and tibial  veins demonstrate normal compressibility, spectral waveform, color  flow and augmentation.    The left common femoral vein, femoral vein, popliteal vein and tibial  veins demonstrate normal compressibility, spectral waveform, color  flow and augmentation.      Impression    IMPRESSION:   No deep vein thrombosis in either lower extremity.    ELADIA ABRAHAM, DO   CT Chest Abdomen Pelvis w/o Contrast    Narrative    CT CHEST, ABDOMEN, AND PELVIS WITHOUT CONTRAST 7/25/2020 3:22 PM     HISTORY: Hypoxia.    COMPARISON: None.    TECHNIQUE: Volumetric helical acquisition of CT images from the lung  apices through the symphysis pubis without contrast. Radiation dose  for this scan was reduced using automated exposure control, adjustment  of the mA and/or kV according to patient size, or iterative  reconstruction technique.    FINDINGS:     Chest: Minimal right and trace left pleural effusion. No pericardial  effusion. Moderately extensive interstitial and groundglass  infiltrates. A few fissural nodules and subpleural nodules are noted  measuring 5 mm or less.    Abdomen and pelvis: Small amount of pelvic free fluid which is  nonspecific. There are no dilated loops of small intestine or large  bowel to suggest ileus or obstruction. Cholecystectomy change. The  liver, spleen, adrenal glands, kidneys, and pancreas demonstrate no  worrisome focal lesion in the absence of intravenous contrast. There  are mild atherosclerotic changes of the visualized aorta and its  branches. There is no evidence of aortic aneurysm. No diverticulitis.    Survey of the visualized bony structures demonstrates no destructive  bony lesions.      Impression    IMPRESSION:  1. Moderately extensive interstitial and groundglass infiltrates which  are nonspecific.  2. Minimal right and trace left pleural effusion.  3. Small amount of free fluid in the abdomen and pelvis which is  nonspecific.    SUNITA BONILLA MD    NM Lung Scan Perfusion Particulate    Narrative    NM LUNG SCAN PERFUSION PARTICULATE 7/25/2020 4:40 PM    HISTORY: PE suspected, high pretest probability.    COMPARISON: March 31, 2016    TECHNIQUE: Tc-99m MAA injected intravenously for evaluation of  pulmonary perfusion.    DOSE: 3.6mCi Tc99m MAA @ 1615, L arm IV.      FINDINGS: There are no wedge shaped perfusion defects to suggest  pulmonary emboli. No pattern to suggest chronic pulmonary emboli.      Impression    IMPRESSION: Negative perfusion scan.     SUNITA BONILLA MD         Billing: This patient is critically ill: Yes. Total critical care time today 40 min.            Irma Rollins MD   of Medicine  Division of Pulmonary, Critical Care, Allergy, and Sleep Medicine  Pager [phone]

## 2020-07-25 NOTE — ED PROVIDER NOTES
History     Chief Complaint:  Shortness of Breath and Dizziness     HPI:  Radha Gomez is a 55 year old female with a history of CAD, hyperlipidemia, hypertension, and type 2 diabetes who presents with shortness of breath and dizziness. Patient states that she has been feeling ill for the past month but was feeling better last week. Patient mentions a worsening headache which has been ongoing for 3-4 weeks. She was also on doxycycline a couple weeks ago after sustaining a tick bite. Patient reports a recent fall at which time she hit her head and reports some posterior neck pain here. She also mentions 1 day of constipation a few weeks ago which she believes may be due to her methadone medication. Today, she reports cough, dizziness, and worsening shortness of breath. She has low O2 saturation here and does not usually wear oxygen. Denies any known COVID exposure, chest pain, pressure, or heaviness.    Allergies:  Abilify   Paxil   Contrast Dye  Ditropan   Wellbutrin      Medications:    Xanax  Augmentin  Aspirin, 81 mg  Lipitor  Buspar  peridex  Trulicity  Glycate  Norco  Latuda  Glucophage  Methadone  Omeprazole  Pyridium  Minipress  Inderal  Seroquel  Zantac  Zoloft  Effexor    Past Medical History:    Anxiety  Arthritis  Bipolar 1 disorder  Borderline personality disorder  CAD  Depression  Hyperlipidemia  Hypertension  Interstitial cystitis  Osteoarthritis  PTSD  Type 2 diabetes     Past Surgical History:    Laproscopy x3  Bladder biopsy  EGD  Dental extractions  Venereal wart removal  Left heart catheter  Incision & drainage, mandible abscess  Cholecystectomy  Orthopedic surgery     Family History:    Depression  Anxiety  Substance abuse  Dementia  CAD  Diabetes  Heart failure  Suicide  Mental illness    Social History:  Smoking status: former  Alcohol use: no  Drug use: no  Patient presents alone.  PCP: J Carlos Parsons    Marital Status:       Review of Systems  Please see HPI. All other  "systems reviewed and are negative.    Physical Exam     Vitals:  Patient Vitals for the past 24 hrs:   BP Temp Temp src Pulse Heart Rate Resp SpO2 Height Weight   07/25/20 1550 103/66 -- -- 71 -- 20 95 % -- --   07/25/20 1530 93/62 -- -- 70 71 11 98 % -- --   07/25/20 1529 99/63 -- -- 71 71 15 97 % -- --   07/25/20 1500 108/71 -- -- 72 72 24 96 % -- --   07/25/20 1430 98/67 -- -- 71 71 20 (!) 86 % -- --   07/25/20 1415 101/63 -- -- 71 71 13 100 % -- --   07/25/20 1400 97/60 -- -- 71 71 17 99 % -- --   07/25/20 1330 103/64 -- -- 72 73 21 97 % -- --   07/25/20 1315 95/59 -- -- 71 71 19 96 % -- --   07/25/20 1300 90/56 -- -- -- -- -- 93 % -- --   07/25/20 1245 (!) 87/59 -- -- 67 -- -- 97 % -- --   07/25/20 1153 98/63 97.8  F (36.6  C) Oral 78 -- 22 (!) 85 % 1.6 m (5' 3\") 93.9 kg (207 lb)       Physical Exam:   Constitutional: Alert, attentive, GCS 15  HENT:    Nose: Nose normal.    Mouth/Throat: Oropharynx is clear, mucous membranes are dry  Eyes: EOM are normal, anicteric, conjugate gaze  CV: regular rate and rhythm; no murmurs  Chest: Tachypneic, on nasal cannula, bilateral crackles.  GI:  non tender. No distension. No guarding or rebound.  Faint ecchymosis over ventral abdomen.  MSK: 2+ lower extremity edema, no tenderness to palpation of BLE.  Neurological: Alert, attentive, moving all extremities equally.   Skin: Skin is warm and dry.    Emergency Department Course     ECG (12:23:31):  Rate 73 bpm. MA interval 172. QRS duration 118. QT/QTc 448/493. P-R-T axes 24 29 -52. Normal sinus rhythm. Low voltage QRS. Incomplete right bundle branch block. Cannot rule out inferior infarct. Cannot rule out anterior infarct. ST & T wave abnormality, consider lateral ischemia. When compared with EKG dated 07/02/2016, T-wave inversions are new. Interpreted at 1227 by Alessandro Engel MD.    ECG (12:47:10):  Rate 73 bpm. MA interval 172. QRS duration 114. QT/QTc 432/475. P-R-T axes 26 47 -34. Normal sinus rhythm. Low voltage " QRS. Incomplete right bundle branch block. T wave abnormality, consider inferior and anterior ischemia. Prolonged QT. Interpreted at 1253 by Alessandro Engel MD.    Imaging:  US Lower extremity venous duplex, bilateral  No deep vein thrombosis in either lower extremity.    CT Abdomen Pelvis Chest w/o contrast  1. Moderately extensive interstitial and groundglass infiltrates which  are nonspecific.  2. Minimal right and trace left pleural effusion.  3. Small amount of free fluid in the abdomen and pelvis which is  nonspecific.  Per radiology.    CT Head w/o contrast  Negative for acute intracranial process. No fractures.  Small, stable left frontal parafalcine meningioma redemonstrated.  Per radiology.    Laboratory:  CBC: WBC 13.3 9 (H), HGB 10.6 (L),     CMP: Carbon dioxide 16 (L), Glucose 140 (H), Bun 34 (H), Creatinine 3.79 (H), GFR 13 (L), Calcium 8.1 (L), Albumin 2.6 (L), ALT 1065 (HH), AST 1950 (HH) o/w WNL     Troponin (Collected 1215): 0.118    Creatinine POCT: Creatinine 4.1 (H), GFR 11 (L)    BNP: 27,021 (H)    D dimer: 18.2 (H)    Lactic acid (resulted 1331): 4.9 (HH)  Lactic acid (Resulted 1544): 2.7 (H)    Procalcitonin: 0.16    UA with microscopic: Bilin small (A), Ketones 5 (A), Albumin 30 (A), Urobilinogen 4.0 (H), Leukocyte esterase large (A), WBC/HPF 38 (H), RBC/HPF 3 (H), WBC clumps present (A), Bacteria many (A), Squamous epithelial/HPF 3 (H), Mucous present (A), Hyaline casts 47 (H) o/w WNL    Ketone B-hydroxybutyrate: Ketones 0.7 (H)    Acetaminophen level: <2    CK total: 104    Blood gas venous: pH 7.12 (LL), PCO2 44, PO2 26, Bicarbonate 14 (L), FlO2 2, Oxyhgb 35, base deficit 14.3    Symptomatic COVID-19 virus: pending    Blood culture x2: pending    Urine culture: pending    Interventions:   1220 0.9% NS bolus, 1000 mL, IV  1334 Zosyn, 3.375 g, IV  1425 Vancomycin, 2000 mg, IV    Emergency Department Course:  1155  Past medical records, nursing notes, and vitals reviewed.   I  performed an exam of the patient and obtained history, as documented above.    1223 EKG was taken in the ED.     1229 IV was inserted and blood was drawn for laboratory testing, results above.     1247 EKG was taken in the ED.     1301 I performed a POC ultrasound, results above.    1333 The patient provided a urine sample here in the emergency department. This was sent for laboratory testing, findings above.    1400 RN attempted PICC line placement. No PICC access.    1429 The patient was sent for chest, abdominal, and head CTs while in the emergency department, results above.     1505 Findings and plan explained to the Patient who consents to admission. Discussed the patient with Dr. Avendano, who will admit the patient for further monitoring, evaluation, and treatment.    I personally reviewed the laboratory results with the Patient and answered all related questions prior to admission.     Impression & Plan     Critical Care Time: 60 minutes excluding procedures    Medical Decision Making:  Radha Gomez is a 55 year old female past medical history significant for bipolar disorder, diabetes, hypertension, coronary artery disease presenting for evaluation of several days increasing shortness of breath especially with exertion, fatigue malaise, diarrhea this is in the setting of treatment for tick exposure 1 month prior with 2 weeks of doxycycline.  On arrival here, she was noted to have low blood pressure and hypoxia to 85% this was readily resolved with 4 to 6 L nasal cannula.  Her history is less suggestive of PE and most suggestive of infectious etiology, most likely COVID-19 and sepsis eval was undertaken.  Her lactate was elevated at 4.9, she did get 1 L IV fluids however further bolusing was stopped due to a BNP of 27,000, I did perform bedside echo and IVC was large at 2.4 cm suggestive of heart failure or RV dysfunction.  On limited bedside echo, her LV function does not look great though no effusion was  there are no clear RV dilation.  With her hypotension and hypoxia, PE had to be considered her dimer is markedly elevated at 18 however her acute renal failure (creatinine 3.79) and severe contrast allergy precluded CT pulmonary angiogram.  As such a VQ scan was done which was negative as well as ultrasound of bilateral lower extremities.  CT chest and abdomen was obtained without contrast which shows diffuse bilateral groundglass opacities suggestive of possible infectious etiology.  She was initiated on broad-spectrum antibiotics including Vanco and Zosyn and her UA does show possible infection though she has a history of chronic cystitis.  Urine culture sent.  Blood cultures in process.  AST 1900, ALT 1000, tylenol level negative, ? Hypotension/hypoxia. Repeat lactate is improving to 2.7.  Her blood pressure has stabilized with her 1 L of IV fluids with maps greater than 65% at this time we do not see any indication for pressors and central line was deferred.  Of note, the PIC team did evaluate the patient and said she has poor access for this.  She was discussed with admitted to the intensivist (Dr. Bower) for multisystem organ failure thought secondary to sepsis of unclear etiology with high concern for COVID-19.    Diagnosis:    ICD-10-CM    1. Acute respiratory failure with hypoxia (H)  J96.01 Blood culture     Blood culture     Lactic acid   2. Elevated LFTs  R79.89    3. Elevated brain natriuretic peptide (BNP) level  R79.89    4. Elevated troponin  R79.89    5. APOLLO (acute kidney injury) (H)  N17.9         Disposition:  Admitted to Dr. Avendano.     Alessandro Engel MD  [employer]  5:00 PM 07/25/20     Scribe Disclosure:  I, Eloisa William, am serving as a scribe at 12:16 PM on 7/25/2020 to document services personally performed by Alessandro Engel MD based on my observations and the provider's statements to me.       Eloisa William  7/25/2020     Alessandro Engel  MD [facility]  07/25/20 [id number]

## 2020-07-26 ENCOUNTER — APPOINTMENT (OUTPATIENT)
Dept: CARDIOLOGY | Facility: CLINIC | Age: 55
DRG: 314 | End: 2020-07-26
Attending: INTERNAL MEDICINE
Payer: COMMERCIAL

## 2020-07-26 ENCOUNTER — APPOINTMENT (OUTPATIENT)
Dept: OCCUPATIONAL THERAPY | Facility: CLINIC | Age: 55
DRG: 314 | End: 2020-07-26
Attending: INTERNAL MEDICINE
Payer: COMMERCIAL

## 2020-07-26 LAB
ALBUMIN SERPL-MCNC: 2.3 G/DL (ref 3.4–5)
ALP SERPL-CCNC: 102 U/L (ref 40–150)
ALT SERPL W P-5'-P-CCNC: 987 U/L (ref 0–50)
ANION GAP SERPL CALCULATED.3IONS-SCNC: 10 MMOL/L (ref 3–14)
AST SERPL W P-5'-P-CCNC: 1332 U/L (ref 0–45)
BILIRUB SERPL-MCNC: 0.7 MG/DL (ref 0.2–1.3)
BUN SERPL-MCNC: 41 MG/DL (ref 7–30)
CALCIUM SERPL-MCNC: 7.4 MG/DL (ref 8.5–10.1)
CHLORIDE SERPL-SCNC: 108 MMOL/L (ref 94–109)
CO2 SERPL-SCNC: 19 MMOL/L (ref 20–32)
CREAT SERPL-MCNC: 4.14 MG/DL (ref 0.52–1.04)
ERYTHROCYTE [DISTWIDTH] IN BLOOD BY AUTOMATED COUNT: 15.2 % (ref 10–15)
ERYTHROCYTE [DISTWIDTH] IN BLOOD BY AUTOMATED COUNT: 15.4 % (ref 10–15)
GFR SERPL CREATININE-BSD FRML MDRD: 11 ML/MIN/{1.73_M2}
GLUCOSE BLDC GLUCOMTR-MCNC: 168 MG/DL (ref 70–99)
GLUCOSE BLDC GLUCOMTR-MCNC: 170 MG/DL (ref 70–99)
GLUCOSE BLDC GLUCOMTR-MCNC: 79 MG/DL (ref 70–99)
GLUCOSE BLDC GLUCOMTR-MCNC: 88 MG/DL (ref 70–99)
GLUCOSE BLDC GLUCOMTR-MCNC: 97 MG/DL (ref 70–99)
GLUCOSE SERPL-MCNC: 88 MG/DL (ref 70–99)
HCT VFR BLD AUTO: 28.8 % (ref 35–47)
HCT VFR BLD AUTO: 31 % (ref 35–47)
HGB BLD-MCNC: 10.2 G/DL (ref 11.7–15.7)
HGB BLD-MCNC: 9.6 G/DL (ref 11.7–15.7)
INR PPP: 1.26 (ref 0.86–1.14)
LMWH PPP CHRO-ACNC: <0.1 IU/ML
MCH RBC QN AUTO: 32.2 PG (ref 26.5–33)
MCH RBC QN AUTO: 32.5 PG (ref 26.5–33)
MCHC RBC AUTO-ENTMCNC: 32.9 G/DL (ref 31.5–36.5)
MCHC RBC AUTO-ENTMCNC: 33.3 G/DL (ref 31.5–36.5)
MCV RBC AUTO: 98 FL (ref 78–100)
MCV RBC AUTO: 98 FL (ref 78–100)
PLATELET # BLD AUTO: 289 10E9/L (ref 150–450)
PLATELET # BLD AUTO: 305 10E9/L (ref 150–450)
POTASSIUM SERPL-SCNC: 4.3 MMOL/L (ref 3.4–5.3)
PROT SERPL-MCNC: 5.8 G/DL (ref 6.8–8.8)
RBC # BLD AUTO: 2.95 10E12/L (ref 3.8–5.2)
RBC # BLD AUTO: 3.17 10E12/L (ref 3.8–5.2)
SODIUM SERPL-SCNC: 137 MMOL/L (ref 133–144)
SODIUM UR-SCNC: 33 MMOL/L
WBC # BLD AUTO: 11.6 10E9/L (ref 4–11)
WBC # BLD AUTO: 9.9 10E9/L (ref 4–11)

## 2020-07-26 PROCEDURE — 12000000 ZZH R&B MED SURG/OB

## 2020-07-26 PROCEDURE — 25000128 H RX IP 250 OP 636: Performed by: INTERNAL MEDICINE

## 2020-07-26 PROCEDURE — 99232 SBSQ HOSP IP/OBS MODERATE 35: CPT | Performed by: INTERNAL MEDICINE

## 2020-07-26 PROCEDURE — P9047 ALBUMIN (HUMAN), 25%, 50ML: HCPCS | Performed by: INTERNAL MEDICINE

## 2020-07-26 PROCEDURE — 85520 HEPARIN ASSAY: CPT | Performed by: INTERNAL MEDICINE

## 2020-07-26 PROCEDURE — 99207 ZZC MOONLIGHTING INDICATOR: CPT | Performed by: INTERNAL MEDICINE

## 2020-07-26 PROCEDURE — 85027 COMPLETE CBC AUTOMATED: CPT | Performed by: INTERNAL MEDICINE

## 2020-07-26 PROCEDURE — 25500064 ZZH RX 255 OP 636: Performed by: INTERNAL MEDICINE

## 2020-07-26 PROCEDURE — 99221 1ST HOSP IP/OBS SF/LOW 40: CPT | Performed by: PSYCHIATRY & NEUROLOGY

## 2020-07-26 PROCEDURE — 36415 COLL VENOUS BLD VENIPUNCTURE: CPT | Performed by: INTERNAL MEDICINE

## 2020-07-26 PROCEDURE — 25800030 ZZH RX IP 258 OP 636: Performed by: INTERNAL MEDICINE

## 2020-07-26 PROCEDURE — 25000132 ZZH RX MED GY IP 250 OP 250 PS 637: Performed by: INTERNAL MEDICINE

## 2020-07-26 PROCEDURE — 00000146 ZZHCL STATISTIC GLUCOSE BY METER IP

## 2020-07-26 PROCEDURE — 80053 COMPREHEN METABOLIC PANEL: CPT | Performed by: INTERNAL MEDICINE

## 2020-07-26 PROCEDURE — 85610 PROTHROMBIN TIME: CPT | Performed by: INTERNAL MEDICINE

## 2020-07-26 PROCEDURE — 99233 SBSQ HOSP IP/OBS HIGH 50: CPT | Performed by: INTERNAL MEDICINE

## 2020-07-26 PROCEDURE — 99207 ZZC CDG-MDM COMPONENT: MEETS LOW - DOWN CODED: CPT | Performed by: INTERNAL MEDICINE

## 2020-07-26 PROCEDURE — 97535 SELF CARE MNGMENT TRAINING: CPT | Mod: GO

## 2020-07-26 PROCEDURE — 93306 TTE W/DOPPLER COMPLETE: CPT | Mod: 26 | Performed by: INTERNAL MEDICINE

## 2020-07-26 PROCEDURE — 99221 1ST HOSP IP/OBS SF/LOW 40: CPT | Mod: 25 | Performed by: INTERNAL MEDICINE

## 2020-07-26 PROCEDURE — 97165 OT EVAL LOW COMPLEX 30 MIN: CPT | Mod: GO

## 2020-07-26 PROCEDURE — 86709 HEPATITIS A IGM ANTIBODY: CPT | Performed by: INTERNAL MEDICINE

## 2020-07-26 PROCEDURE — 40000264 ECHOCARDIOGRAM COMPLETE

## 2020-07-26 RX ORDER — QUETIAPINE FUMARATE 25 MG/1
50 TABLET, FILM COATED ORAL 3 TIMES DAILY PRN
Status: DISCONTINUED | OUTPATIENT
Start: 2020-07-26 | End: 2020-07-29 | Stop reason: HOSPADM

## 2020-07-26 RX ORDER — BUSPIRONE HYDROCHLORIDE 15 MG/1
30 TABLET ORAL 2 TIMES DAILY
Status: DISCONTINUED | OUTPATIENT
Start: 2020-07-27 | End: 2020-07-29 | Stop reason: HOSPADM

## 2020-07-26 RX ORDER — HEPARIN SODIUM 10000 [USP'U]/100ML
1650 INJECTION, SOLUTION INTRAVENOUS CONTINUOUS
Status: DISCONTINUED | OUTPATIENT
Start: 2020-07-26 | End: 2020-07-28

## 2020-07-26 RX ORDER — FUROSEMIDE 10 MG/ML
20 INJECTION INTRAMUSCULAR; INTRAVENOUS ONCE
Status: COMPLETED | OUTPATIENT
Start: 2020-07-26 | End: 2020-07-26

## 2020-07-26 RX ORDER — ALBUMIN (HUMAN) 12.5 G/50ML
25 SOLUTION INTRAVENOUS EVERY 6 HOURS
Status: COMPLETED | OUTPATIENT
Start: 2020-07-26 | End: 2020-07-26

## 2020-07-26 RX ORDER — HEPARIN SODIUM 10000 [USP'U]/100ML
1300 INJECTION, SOLUTION INTRAVENOUS CONTINUOUS
Status: DISCONTINUED | OUTPATIENT
Start: 2020-07-26 | End: 2020-07-26

## 2020-07-26 RX ADMIN — HEPARIN SODIUM 5000 UNITS: 5000 INJECTION, SOLUTION INTRAVENOUS; SUBCUTANEOUS at 13:31

## 2020-07-26 RX ADMIN — HUMAN ALBUMIN MICROSPHERES AND PERFLUTREN 9 ML: 10; .22 INJECTION, SOLUTION INTRAVENOUS at 08:45

## 2020-07-26 RX ADMIN — SODIUM CHLORIDE 500 ML: 9 INJECTION, SOLUTION INTRAVENOUS at 11:00

## 2020-07-26 RX ADMIN — HEPARIN SODIUM 5000 UNITS: 5000 INJECTION, SOLUTION INTRAVENOUS; SUBCUTANEOUS at 04:58

## 2020-07-26 RX ADMIN — PRAZOSIN HYDROCHLORIDE 5 MG: 5 CAPSULE ORAL at 08:14

## 2020-07-26 RX ADMIN — HYDROCODONE BITARTRATE AND ACETAMINOPHEN 2 TABLET: 5; 325 TABLET ORAL at 02:26

## 2020-07-26 RX ADMIN — QUETIAPINE 300 MG: 300 TABLET, FILM COATED ORAL at 21:30

## 2020-07-26 RX ADMIN — PRAZOSIN HYDROCHLORIDE 10 MG: 5 CAPSULE ORAL at 21:29

## 2020-07-26 RX ADMIN — HEPARIN SODIUM 1300 UNITS/HR: 10000 INJECTION, SOLUTION INTRAVENOUS at 14:30

## 2020-07-26 RX ADMIN — PIPERACILLIN SODIUM AND TAZOBACTAM SODIUM 2.25 G: 2; .25 INJECTION, POWDER, LYOPHILIZED, FOR SOLUTION INTRAVENOUS at 20:23

## 2020-07-26 RX ADMIN — ALBUMIN HUMAN 25 G: 0.25 SOLUTION INTRAVENOUS at 17:55

## 2020-07-26 RX ADMIN — METHADONE HYDROCHLORIDE 10 MG: 10 TABLET ORAL at 20:22

## 2020-07-26 RX ADMIN — OMEPRAZOLE 20 MG: 20 CAPSULE, DELAYED RELEASE ORAL at 08:14

## 2020-07-26 RX ADMIN — FUROSEMIDE 20 MG: 10 INJECTION, SOLUTION INTRAVENOUS at 08:14

## 2020-07-26 RX ADMIN — HUMAN ALBUMIN MICROSPHERES AND PERFLUTREN 9 ML: 10; .22 INJECTION, SOLUTION INTRAVENOUS at 11:55

## 2020-07-26 RX ADMIN — Medication 4500 UNITS: at 22:10

## 2020-07-26 RX ADMIN — PIPERACILLIN SODIUM AND TAZOBACTAM SODIUM 2.25 G: 2; .25 INJECTION, POWDER, LYOPHILIZED, FOR SOLUTION INTRAVENOUS at 08:14

## 2020-07-26 RX ADMIN — HEPARIN SODIUM 1700 UNITS/HR: 10000 INJECTION, SOLUTION INTRAVENOUS at 22:11

## 2020-07-26 RX ADMIN — ALBUMIN HUMAN 25 G: 0.25 SOLUTION INTRAVENOUS at 12:03

## 2020-07-26 RX ADMIN — PIPERACILLIN SODIUM AND TAZOBACTAM SODIUM 2.25 G: 2; .25 INJECTION, POWDER, LYOPHILIZED, FOR SOLUTION INTRAVENOUS at 02:14

## 2020-07-26 RX ADMIN — PIPERACILLIN SODIUM AND TAZOBACTAM SODIUM 2.25 G: 2; .25 INJECTION, POWDER, LYOPHILIZED, FOR SOLUTION INTRAVENOUS at 13:31

## 2020-07-26 RX ADMIN — METHADONE HYDROCHLORIDE 10 MG: 10 TABLET ORAL at 08:14

## 2020-07-26 ASSESSMENT — ACTIVITIES OF DAILY LIVING (ADL)
ADLS_ACUITY_SCORE: 13
ADLS_ACUITY_SCORE: 15
ADLS_ACUITY_SCORE: 13
ADLS_ACUITY_SCORE: 11
ADLS_ACUITY_SCORE: 13
ADLS_ACUITY_SCORE: 13

## 2020-07-26 ASSESSMENT — MIFFLIN-ST. JEOR: SCORE: 1584.71

## 2020-07-26 NOTE — PROGRESS NOTES
Neuro: A/OX4   Pulm: LS clear. On 4L via NC. GUSMAN  CV: SR. Hypotensive improved after bolus and albumin.  : Voiding. Strain to void which is her baseline.    GI: Clear liquids. BS present. Distended abdomen. Constipation per pt.   Extremities: SBA for mobility. Generalized weakness.   Skin: intact  Lines: PIVx1 on LUE  Family:  updated at bedside  Plan: transfer out of ICU today and continue to monitor.

## 2020-07-26 NOTE — CONSULTS
Consult Date:  07/26/2020      IDENTIFICATION:  This is a 55-year-old female admitted through the Emergency Room, dated 07/25/2020 in the setting of shortness of breath and dizziness.      REASON FOR CONSULTATION:  Evaluation and assessment with respect to elevated serum creatinine.      IMPRESSION:   1. Apparent near baseline normal renal function on the basis of data review.  Creatinine prior to admission dated 08/31/2019 in the range of 0.9 mg/dL.   2. Historically bland urinalysis by review.   3. Type 2 diabetes.   4. Acute kidney injury, borderline oliguric with creatinine today noted to be 4.14 mg/dL.  Diagnostic imaging studies in the form of an ultrasound and CT scan on admission do not demonstrate renal abnormality.  Urine sodium was noted to be 35.   5. Urinalysis demonstrating multiple hyaline casts.  Patient's history and presentation most consistent at this time with acute tubular necrosis secondary to decreased p.o. intake and a large use of nonsteroidal anti-inflammatory medications.  She does have elevated liver function tests, but her CK is within normal limits, and this does not represent rhabdomyolysis.  Low suspicion for underlying glomerular disease given history.   6. Lactic acidosis, improving.   7. Elevated transaminases with a negative Tylenol level.   8. Presentation with hypotension contributing to her dizziness, likely decreased renal perfusion and possibly decreased liver perfusion.      DISCUSSION:  Middle-aged woman with no known renal history who presents with acute renal insufficiency in the setting of dizziness, hypotension, several-week history of poor intake and significant nonsteroidal anti-inflammatory use.  She has ATN on this basis.      RECOMMENDATIONS:   1. No further nephrotoxic agents.   2. Monitor urine output.   3. Follow laboratory studies.   4. No indication for dialysis at this time.   5. Maintain systolic blood pressure greater than 100.   6. Will continue to follow  with you.      HISTORY:  This is a 55-year-old female with a history of diabetes, as well as some mental health issues, who presented to the Emergency Room in the setting of shortness of breath and dizziness.  Her initial presenting blood pressure was 98/60.  She did receive saline in the Emergency Room.  Her initial creatinine was 3.79.  She had elevated transaminases as well.  Her CK was within normal limits.  Her urine sodium was 35.  Diagnostic imaging studies were negative.  She had a mild acidosis, which is correcting.      Urinalysis demonstrated hyaline casts.  Imaging studies nondiagnostic.      Several-week history of poor intake and headache.  She has not been taking Tylenol.  She has been using ibuprofen.  She has prescription strength 800 mg tablets.  She has been taking 1 each morning up until approximately 2 weeks ago when she began taking 2 per morning.  This has been going on up until admission.      At present, she is sitting in a chair, awake, alert and interactive.  She has urinated since admission.  Her blood pressure is in the 80s.  Sats are adequate but requiring nasal cannula oxygen.  Her weight is up slightly despite I's and O's not concordant with that she did receive 20 of Lasix earlier today.      Given her blood pressure, she may benefit from volume.      PAST MEDICAL HISTORY:  Anxiety, arthritis, bipolar 1, borderline personality, coronary disease, depression, hyperlipidemia, hypertension, history of interstitial cystitis, osteoarthritis, type 2 diabetes.      Modest lactic acidosis, likely in part secondary to renal failure and ongoing use of metformin.      ADMISSION MEDICATIONS:  Xanax, Augmentin, aspirin, Lipitor, BuSpar, Trulicity, Glycate, Norco, Latuda, Glucophage, methadone, omeprazole, Pyridium, Minipress, Inderal, Seroquel, Zantac, Zoloft, Effexor.      SOCIAL HISTORY:  Remote smoker.  No current alcohol.  .      FAMILY HISTORY:  Remarkable for psychiatric issues, some  dementia, coronary artery disease, diabetes, no overt renal failure.      REVIEW OF SYSTEMS:  Complete 10-point review of systems undertaken.  Pertinent positives and negatives are as I noted above.      PHYSICAL EXAMINATION:   VITAL SIGNS:  She is afebrile.  Rhythm sinus.  Her blood pressure is in the range of 90.  Sats are adequate on nasal cannula oxygen.   GENERAL:  No acute distress, appropriate.   HEENT:  Normocephalic, atraumatic.  Pupils equal, round, and reactive to light and accommodation.  Extraocular muscles intact.  Sclerae nonicteric.  Conjunctivae not injected.  External cardiac auditory canals and nares visually patent.  Pharynx without lesion.   CHEST:  Symmetric and clear.   ABDOMEN:  Soft.   EXTREMITIES:  Warm without edema.   NEUROLOGIC:  Grossly nonfocal.   PSYCHIATRIC:  Pleasant.      LABORATORY DATA:  Current and historic reviewed in detail.            RON SY MD             D: 2020   T: 2020   MT:       Name:     RONNIE PACHECO   MRN:      [id number]        Account:       [id number]   :      1965           Consult Date:  2020      Document: [id number]

## 2020-07-26 NOTE — PLAN OF CARE
Patient ate well up in chair. Up to the bathroom with SBA, mild SOB with activity. Patient states ribs hurt when up walking but better when sitting in the chair. Voided well,VSS. Ok to transfer to .

## 2020-07-26 NOTE — CONSULTS
[facility]    Cardiology Consultation     Date of Admission:  7/25/2020  Date of Consult (When I saw the patient): 07/26/20    Assessment & Plan   Radha Gomez is a 55 year old female who was admitted on 7/25/2020. I was asked to see the patient for RV dilation noted from echo. Friendly pt with bipolar disorder on chronic methadone, htn and dm. Had normal cath in 2016 done for chest discomfort along with equivocal stress test. Presented with acute onset of sob 2 days pta with inability to ambulate to the bathroom. Noted discomfort below rib cage but not on chest. ecg shows nsr with incomplete RBBB new in comparison to 2016. Markedly abnl lab including elevated transaminases (ALT 1064, AST 1950), bnp 27 k and ARF with creat of 3.8 and mildly elevated trop peaked at 0.12.     CT with contrast not done due to ARF, VQ scan negative for PE, however. Echo does show moderate apical RV dilation and dysfunction but preserved LVEF. Renal felt ARF due to ATN from reduced po intake and large use of NSAIDs. Sob resolved for the most part.    Acute onset of sob along with echo findings of RV dilation and dysfunction coupled with positive troponin does suggestive of acute PE but VQ scan was negative. Moreover, given multiorgan involvement including liver and kidneys somewhat odd for acute PE as the lone culprit. It is reasonable to continue heparin in the absence of any contraindication. There is no recent echo for comparison. But if RV dilation persists and other organs dysfunction resolved then we may consider obtaining MRI to look for evidence of ARVD in light of incomplete RBBB noted on ECG.  We will follow peripherally.    Jose Manuel Rocha    Code Status    Full Code    Primary Care Physician   J Carlos Parsons    History is obtained from the patient    Past Medical History   I have reviewed this patient's medical history and updated it with pertinent information if needed.   Past Medical History:    Diagnosis Date     Anxiety      Arthritis      Back injury      Bipolar 1 disorder (H)      Borderline personality disorder (H)      Chest pain      Coronary artery disease     non-obstructive, per cath 4/2016     Depressive disorder      HLD (hyperlipidemia)      HTN (hypertension)     mild     IC (interstitial cystitis) age 21    feels like a  bladder infection- very painful; symptoms since 15     Osteoarthritis      Palpitations      PTSD (post-traumatic stress disorder)        Past Surgical History   I have reviewed this patient's surgical history and updated it with pertinent information if needed.  Past Surgical History:   Procedure Laterality Date     ABDOMEN SURGERY      3 laproscopies     BIOPSY      bladder     COLONOSCOPY  age 47     ESOPHAGOSCOPY, GASTROSCOPY, DUODENOSCOPY (EGD), COMBINED  6/24/2013    Procedure: COMBINED ESOPHAGOSCOPY, GASTROSCOPY, DUODENOSCOPY (EGD), BIOPSY SINGLE OR MULTIPLE;  gastroscopy;  Surgeon: Nathalie Cotter MD;  Location:  GI     EXTRACTION(S) DENTAL N/A 8/30/2019    Procedure: EXTRACTION, TOOTH #30 AND #28;  Surgeon: Rachid Pérez DDS;  Location:  OR     GYN SURGERY      venereal warts removed     HC LEFT HEART CATHETERIZATION  4/15/16    non-obstructive CAD     INCISION AND DRAINAGE MANDIBLE, COMBINED N/A 8/30/2019    Procedure: INCISION AND DRAINAGE, MANDIBLE ABSCESS;  Surgeon: Rachid Pérez DDS;  Location:  OR     LAPAROSCOPIC CHOLECYSTECTOMY  6/25/2013    Procedure: LAPAROSCOPIC CHOLECYSTECTOMY;  LAPAROSCOPIC CHOLECYSTECTOMY.;  Surgeon: Salomón Wall MD;  Location:  OR     laporoscopy       ORTHOPEDIC SURGERY         Prior to Admission Medications   Prior to Admission Medications   Prescriptions Last Dose Informant Patient Reported? Taking?   ALPRAZolam (XANAX) 0.25 MG tablet Past Week at Unknown time  Yes Yes   Sig: Take 1.5 mg by mouth daily as needed for anxiety 0.25 mg x 6 tablets   HYDROcodone-acetaminophen (NORCO) 5-325 MG per  tablet 7/25/2020 at am Spouse/Significant Other Yes Yes   Sig: Take 2 tablets by mouth 3 times daily as needed for moderate to severe pain    QUEtiapine (SEROQUEL) 300 MG tablet 7/24/2020 at pm  Yes Yes   Sig: Take 300 mg by mouth every evening   aspirin 81 MG EC tablet 7/25/2020 at am  No Yes   Sig: Take 1 tablet (81 mg) by mouth daily   atorvastatin (LIPITOR) 40 MG tablet 7/25/2020 at am  Yes Yes   Sig: Take 40 mg by mouth daily   busPIRone HCl (BUSPAR) 30 MG tablet 7/25/2020 at am  Yes Yes   Sig: Take by mouth 2 times daily    dulaglutide (TRULICITY) 0.75 MG/0.5ML pen 7/24/2020  Yes Yes   Sig: Inject 0.75 mg Subcutaneous every 7 days   glycopyrrolate (GLYCATE) 2 MG tablet 7/25/2020 at am  Yes Yes   Sig: Take 4 mg by mouth every morning 2 x 2mg   glycopyrrolate (GLYCATE) 2 MG tablet 7/24/2020 at am  Yes Yes   Sig: Take 2 mg by mouth every evening   ibuprofen (ADVIL/MOTRIN) 800 MG tablet 7/25/2020 at am  Yes Yes   Sig: Take 800 mg by mouth every morning   ibuprofen (ADVIL/MOTRIN) 800 MG tablet Past Week at am  Yes Yes   Sig: Take 800 mg by mouth daily as needed for moderate pain   lurasidone (LATUDA) 60 MG TABS tablet 7/24/2020 at pm  Yes Yes   Sig: Take 60 mg by mouth every evening    metFORMIN (GLUCOPHAGE) 1000 MG tablet 7/25/2020 at am  Yes Yes   Sig: Take 1,000 mg by mouth 2 times daily (with meals)   methadone (DOLOPHINE) 10 MG tablet 7/25/2020 at am  Yes Yes   Sig: Take 10 mg by mouth 2 times daily   omeprazole (PRILOSEC) 20 MG DR capsule 7/25/2020 at am  Yes Yes   Sig: Take 20 mg by mouth daily OR ZANTAC   phenazopyridine (PYRIDIUM) 200 MG tablet More than a month at Unknown time  Yes No   Sig: Take 200 mg by mouth 3 times daily as needed for irritation   prazosin (MINIPRESS) 5 MG capsule 7/24/2020 at pm  Yes Yes   Sig: Take 10 mg by mouth At Bedtime   prazosin (MINIPRESS) 5 MG capsule 7/25/2020 at am  Yes Yes   Sig: Take 5 mg by mouth every morning   propranolol ER (INDERAL LA) 80 MG 24 hr capsule 7/25/2020  at am  Yes Yes   Sig: Take 80 mg by mouth daily   ranitidine (ZANTAC) 150 MG tablet Not taking  Yes No   Sig: Take 150 mg by mouth 2 times daily OR OMEPRAZOLE   venlafaxine (EFFEXOR-XR) 150 MG 24 hr capsule 7/25/2020 at am  Yes Yes   Sig: Take 300 mg by mouth daily      Facility-Administered Medications: None     Allergies   Allergies   Allergen Reactions     Abilify [Aripiprazole] Cramps     Total body- couldn't walk     Paxil [Paroxetine] Other (See Comments)     Total body pain     Contrast Dye Swelling     Ditropan [Oxybutynin Chloride]      Can't Urinate, Per Pt.     Wellbutrin [Bupropion] Palpitations     If not XL       Social History   I have reviewed this patient's social history and updated it with pertinent information if needed. Radha Gomez  reports that she quit smoking about 8 years ago. Her smoking use included cigarettes. She has a 35.00 pack-year smoking history. She has never used smokeless tobacco. She reports that she does not drink alcohol or use drugs.    Family History   I have reviewed this patient's family history and updated it with pertinent information if needed.   Family History   Problem Relation Age of Onset     Depression Mother      Anxiety Disorder Mother      Substance Abuse Mother      Dementia Mother      Mental Illness Mother         sexually abused by step father      Coronary Artery Disease Mother      Diabetes Mother      Depression Father      Substance Abuse Father      Mental Illness Father         [employer] vet- blamed others, angry     Heart Failure Father      Dementia Maternal Grandmother      Suicide Sister 36        OD     Mental Illness Sister         DID, neglected, abused and in foster homes     Bipolar Disorder Son      Depression Son      Mental Illness Son         ODD     Suicide Daughter 15        attempted X2, OD and cutting; OD     Depression Daughter      Anxiety Disorder Daughter      Mental Illness Daughter 12        trichitillimania. PTSD      Depression Sister      Anxiety Disorder Sister      Depression Sister      Anxiety Disorder Sister      Suicide Sister 38        post partum- lost the son-     Substance Abuse Sister        Review of Systems   Comprehensive review of systems was performed with pertinent positives and negatives listed in assessment and plan section.    Physical Exam   Temp: 98.3  F (36.8  C) Temp src: Axillary BP: 101/47 Pulse: 63 Heart Rate: 60 Resp: 12 SpO2: 95 % O2 Device: Nasal cannula Oxygen Delivery: 2 LPM  Vital Signs with Ranges  Temp:  [97.9  F (36.6  C)-99.5  F (37.5  C)] 98.3  F (36.8  C)  Pulse:  [63-73] 63  Heart Rate:  [60-73] 60  Resp:  [10-36] 12  BP: ()/(42-89) 101/47  SpO2:  [91 %-100 %] 95 %  226 lbs 1.6 oz    Constitutional: awake, alert, cooperative, no apparent distress, and appears stated age  Eyes: Lids and lashes normal, pupils equal, round and reactive to light, extra ocular muscles intact, sclera clear, conjunctiva normal  ENT: Normocephalic, without obvious abnormality, atraumatic, sinuses nontender on palpation, external ears without lesions, oral pharynx with moist mucous membranes, tonsils without erythema or exudates, gums normal and good dentition.  Hematologic / Lymphatic: no cervical lymphadenopathy  Respiratory: No increased work of breathing, good air exchange, clear to auscultation bilaterally, no crackles or wheezing  Cardiovascular: Normal apical impulse, regular rate and rhythm, normal S1 and S2, no S3 or S4, and no murmur noted  GI: No scars, normal bowel sounds, soft, non-distended, non-tender, no masses palpated, no hepatosplenomegally  Skin: no bruising or bleeding  Musculoskeletal: There is no redness, warmth, or swelling of the joints.  Full range of motion noted.    Neurologic: Awake, alert, .  Neuropsychiatric: General: normal, calm and normal eye contact    Data   I personally reviewed all recent ECGs and images.  Results for orders placed or performed during the hospital  encounter of 07/25/20 (from the past 24 hour(s))   NM Lung Scan Perfusion Particulate    Narrative    NM LUNG SCAN PERFUSION PARTICULATE 7/25/2020 4:40 PM    HISTORY: PE suspected, high pretest probability.    COMPARISON: March 31, 2016    TECHNIQUE: Tc-99m MAA injected intravenously for evaluation of  pulmonary perfusion.    DOSE: 3.6mCi Tc99m MAA @ 1615, L arm IV.      FINDINGS: There are no wedge shaped perfusion defects to suggest  pulmonary emboli. No pattern to suggest chronic pulmonary emboli.      Impression    IMPRESSION: Negative perfusion scan.     SUNITA BONILLA MD   Glucose by meter   Result Value Ref Range    Glucose 132 (H) 70 - 99 mg/dL   Psychiatry IP Consult: complex psych history on multiple medications with new acute hepatitis and acute kidney injury.  Assist with resuming medications.; Consultant may enter orders: Yes; Patient to be seen: Routine; Call back #: ICU; Requesting ...    Narrative    Pernell Rashid MD     7/26/2020 11:04 AM  Patient seen this AM, note to follow     US Abdomen Complete Portable    Narrative    EXAM: US ABDOMEN COMPLETE PORTABLE  LOCATION: [facility]  DATE/TIME: 7/25/2020 6:39 PM    INDICATION: Elevated liver function tests, renal disease  COMPARISON: CT earlier today  TECHNIQUE: Complete abdominal ultrasound.    FINDINGS:    GALLBLADDER: Surgically removed.    BILE DUCTS: No biliary dilatation. The common duct measures 8 mm.    LIVER: Dense hepatic steatosis. No focal mass.    RIGHT KIDNEY: Normal size. Normal echogenicity with no hydronephrosis or mass.     LEFT KIDNEY: Normal size. Normal echogenicity with no hydronephrosis or mass.     SPLEEN: Normal.    PANCREAS: The visualized portions are normal.    AORTA: Normal in caliber.     IVC: Normal where visualized.    Trace ascites.      Impression    IMPRESSION:  1.  Dense hepatic steatosis.  2.  Common hepatic duct mildly prominent at 8 mm without intrahepatic biliary dilatation.   Platelet count    Result Value Ref Range    Platelet Count 321 150 - 450 10e9/L   Troponin I (STAT q4h x3)   Result Value Ref Range    Troponin I ES 0.071 (H) 0.000 - 0.045 ug/L   INR   Result Value Ref Range    INR 1.29 (H) 0.86 - 1.14   Troponin I (STAT q4h x3)   Result Value Ref Range    Troponin I ES 0.073 (H) 0.000 - 0.045 ug/L   Glucose by meter   Result Value Ref Range    Glucose 79 70 - 99 mg/dL   Comprehensive metabolic panel   Result Value Ref Range    Sodium 137 133 - 144 mmol/L    Potassium 4.3 3.4 - 5.3 mmol/L    Chloride 108 94 - 109 mmol/L    Carbon Dioxide 19 (L) 20 - 32 mmol/L    Anion Gap 10 3 - 14 mmol/L    Glucose 88 70 - 99 mg/dL    Urea Nitrogen 41 (H) 7 - 30 mg/dL    Creatinine 4.14 (H) 0.52 - 1.04 mg/dL    GFR Estimate 11 (L) >60 mL/min/[1.73_m2]    GFR Estimate If Black 13 (L) >60 mL/min/[1.73_m2]    Calcium 7.4 (L) 8.5 - 10.1 mg/dL    Bilirubin Total 0.7 0.2 - 1.3 mg/dL    Albumin 2.3 (L) 3.4 - 5.0 g/dL    Protein Total 5.8 (L) 6.8 - 8.8 g/dL    Alkaline Phosphatase 102 40 - 150 U/L     (HH) 0 - 50 U/L    AST 1,332 (HH) 0 - 45 U/L   CBC with platelets   Result Value Ref Range    WBC 11.6 (H) 4.0 - 11.0 10e9/L    RBC Count 3.17 (L) 3.8 - 5.2 10e12/L    Hemoglobin 10.2 (L) 11.7 - 15.7 g/dL    Hematocrit 31.0 (L) 35.0 - 47.0 %    MCV 98 78 - 100 fl    MCH 32.2 26.5 - 33.0 pg    MCHC 32.9 31.5 - 36.5 g/dL    RDW 15.2 (H) 10.0 - 15.0 %    Platelet Count 305 150 - 450 10e9/L   INR (AM Draw)   Result Value Ref Range    INR 1.26 (H) 0.86 - 1.14   Glucose by meter   Result Value Ref Range    Glucose 88 70 - 99 mg/dL   Echocardiogram Complete    Narrative    [id number]  [id number]  [id number]^JESSICA^MANUELA^RODOLFO           [facility]  Echocardiography Laboratory  [address] [place] [id number]        Name: RONNIE PACHECO  MRN: [id number]  : 1965  Study Date: 2020 08:00 AM  Age: 55 yrs  Gender: Female  Patient Location: [facility]  Reason For Study: Heart  Failure  Ordering Physician: MANUELA LOPEZ  Referring Physician: J Carlos Parsons  Performed By: Jourdan Gonzales     BSA: 2.0 m2  Height: 63 in  Weight: 226 lb  HR: 69  BP: 104/67 mmHg  _____________________________________________________________________________  __        Procedure  Complete Portable Echo Adult. Optison (NDC #[id number]) given intravenously.  _____________________________________________________________________________  __        Interpretation Summary     The visual ejection fraction is estimated at 55-60%.  No regional wall motion abnormalities noted.  The left ventricle is normal in structure, function and size.  The right ventricle is moderately dilated.  Moderately decreased right ventricular systolic function  There is no pericardial effusion.     Given RV appearance, would consider PE in differential  _____________________________________________________________________________  __        Left Ventricle  The left ventricle is normal in structure, function and size. There is normal  left ventricular wall thickness. The visual ejection fraction is estimated at  55-60%. Left ventricular diastolic function is indeterminate. No regional wall  motion abnormalities noted.     Right Ventricle  The right ventricle is moderately dilated. Moderately decreased right  ventricular systolic function.     Atria  The left atrium is mildly dilated. Right atrial size is normal.     Mitral Valve  There is no mitral regurgitation noted.        Tricuspid Valve  There is mild (1+) tricuspid regurgitation.     Aortic Valve  No aortic regurgitation is present.     Pulmonic Valve  The pulmonic valve is not well visualized.     Vessels  The aortic root is normal size. Normal size ascending aorta.     Pericardium  There is no pericardial effusion.        Rhythm  Sinus rhythm was noted.  _____________________________________________________________________________  __  MMode/2D Measurements & Calculations  IVSd:  1.1 cm     LVIDd: 4.9 cm  LVIDs: 3.7 cm  LVPWd: 1.2 cm  FS: 25.3 %  LV mass(C)d: 210.6 grams  LV mass(C)dI: 103.4 grams/m2  Ao root diam: 3.4 cm  asc Aorta Diam: 2.8 cm  LVOT diam: 2.0 cm  LVOT area: 3.2 cm2  LA Volume (BP): 80.7 ml  LA Volume Index (BP): 39.6 ml/m2  RWT: 0.47           Doppler Measurements & Calculations  MV E max mat: 102.0 cm/sec  MV A max mat: 90.7 cm/sec  MV E/A: 1.1  MV dec slope: 377.0 cm/sec2  PA acc time: 0.10 sec  TR max mat: 274.2 cm/sec  TR max P.1 mmHg  Pulm Sys Mat: 48.0 cm/sec  Pulm Francis Mat: 45.0 cm/sec  Pulm S/D: 1.1  E/E' av.8  Lateral E/e': 10.2  Medial E/e': 15.4           _____________________________________________________________________________  __           Report approved by: OMAR Boone 2020 11:29 AM      Nephrology IP Consult: Patient to be seen: Routine - within 24 hours; significant APOLLO; Consultant may enter orders: Yes; Requesting provider? Attending physician    Narrative    RON Farah MD     2020 11:14 AM  Note dictated   Glucose by meter   Result Value Ref Range    Glucose 97 70 - 99 mg/dL   CBC with platelets   Result Value Ref Range    WBC 9.9 4.0 - 11.0 10e9/L    RBC Count 2.95 (L) 3.8 - 5.2 10e12/L    Hemoglobin 9.6 (L) 11.7 - 15.7 g/dL    Hematocrit 28.8 (L) 35.0 - 47.0 %    MCV 98 78 - 100 fl    MCH 32.5 26.5 - 33.0 pg    MCHC 33.3 31.5 - 36.5 g/dL    RDW 15.4 (H) 10.0 - 15.0 %    Platelet Count 289 150 - 450 10e9/L

## 2020-07-26 NOTE — PROGRESS NOTES
[facility]    Hospitalist Progress Note    Assessment & Plan   56 yo female with a history of multiple mental health issues on a complex medical regimen, tobacco use, and recent empiric treatment of tick borne illness who presented to the [facility] ER on 7/25 with a 2 day history of dyspnea on exertion.  She was found to have acute kidney kinjury and markedly elevated liver enzymes. We suspect her presentation is related to high NSAID use, polypharmacy, and decrease PO intake over the past 3 months. Was in the ICU, transferred out today 7/26. New RV dysfunction on echo.     Likely PE  Elevated Nt-Pro BNP   - ECHO shows moderate RV dysfunction.  We recommended including pulmonary emboli in differential.  Moderate RV dysfunction and some dilation.  We will start patient on IV heparin for now.  Cannot get CT PE given severe abnormal kidney function at this time.  We will treat for presumed PE currently. Currently comfortable on 3-5L NC. Will consult cardiology given RV dysfunction.   - Was given diuretics this am, became hypotensive.  Was given back 500 cc of normal saline and became normotensive.   Electrolyte abnormalities, liver injury, and APOLLO likely 2/2 to polypharmacy  -slight improvement in LFTs, negative abdominal ultrasound, ECHO pending.  Psych consult to aid with med management. We are concerned that several of her above medications may be exacerbating her current issues (LFTs and renal failure), specifically her lurasidone, sertraline (possible increase in LFTs), and venlafaxine.    -Psychiatry has been consulted. Have recommended changes in holding her Latuda and Effexor.     History of Tick Bite  -History of tick bite, negative lyme antibody, s/p empiric treatment with doxycycline (partial?)  -no obvious s/sx of infections.  Procal 0.16.  One dose of zosyn given in the ED.  Continue x48 hours pending cultures.     Bipolar disorder  -psych following, have recommended changes in holding her  Latuda and Effexor.     Elevated liver enzymes  Marked elevation of LFTs: tylenol level negative, no obvious drug toxicity.  Question possible ?decreased PO intake over the past 4 month, possible orthostatic hypotension contributing, in addition to persistent effects from her very complicated medication regimen? CK level normal.   - complete abdominal ultrasound with dopplers-> negative  - basic hepatitis studies, autoimmune workup pending  - continue to trend LFTs, INR  - gastroenterology consulted     APOLLO   - creatinine of 4  - gentle diuresis today  - nephrology consulted given severely elevated creatinine     Diabetes Mellitus II  -on dulaglutide (injected recently), metformin     # Pain Assessment:  Current Pain Score 7/26/2020   Patient currently in pain? sleeping: patient not able to self report   Pain score (0-10) -   Pain location -   Pain descriptors -   Radha solorzano pain level was assessed and she currently denies pain.      DVT Prophylaxis: On heparin gtt  Code Status: Full Code    Disposition: Expected discharge in 2+ days once treated for likely PE, normotensive, PT/OT evaluation.    Mey Oates MD   Text Page (7am to 6pm)    Interval History   Was given diuretics this am, became hypotensive.  Was given back 500 cc of normal saline and became normotensive.  Echo will help assess volume status. ECHO shows moderate RV dysfunction.  We recommended including pulmonary emboli in differential.  Moderate RV dysfunction and some dilation.      -Data reviewed today: I reviewed all new labs and imaging results over the last 24 hours. I personally reviewed no images or EKG's today.    Physical Exam   Temp: 98.3  F (36.8  C) Temp src: Axillary BP: 95/64 Pulse: 69 Heart Rate: 66 Resp: 14 SpO2: 98 % O2 Device: Nasal cannula Oxygen Delivery: 4 LPM  Vitals:    07/25/20 1153 07/25/20 1732 07/26/20 0600   Weight: 93.9 kg (207 lb) 99.7 kg (219 lb 12.8 oz) 102.6 kg (226 lb 1.6 oz)     Vital Signs with Ranges  Temp:  [97.9  F  (36.6  C)-99.5  F (37.5  C)] 98.3  F (36.8  C)  Pulse:  [63-73] 69  Heart Rate:  [61-73] 66  Resp:  [10-36] 14  BP: ()/(42-89) 95/64  SpO2:  [86 %-100 %] 98 %  I/O last 3 completed shifts:  In: 1250 [P.O.:150; I.V.:100; IV Piggyback:1000]  Out: -     GEN: NAD, pleasant  HEENT: no icterus  CV: RRR, normal s1/s2, no murmurs/rubs/s3/s4, no heave. JVP normal.  CHEST: CTAB  ABD: soft, NT/ND, NABS  : no flank/suprapubic tenderness  NEURO: AA&Ox3, fluent/appropriate, motor grossly nonfocal  PSYCH: cooperative, affect appropriate    Medications       albumin human  25 g Intravenous Q6H     [START ON 7/27/2020] busPIRone  30 mg Oral BID     heparin ANTICOAGULANT  5,000 Units Subcutaneous Q8H     methadone  10 mg Oral BID     nicotine  1 patch Transdermal Daily     nicotine   Transdermal Q8H     omeprazole  20 mg Oral Daily     piperacillin-tazobactam  2.25 g Intravenous Q6H     prazosin  10 mg Oral At Bedtime     prazosin  5 mg Oral QAM     QUEtiapine  300 mg Oral At Bedtime     sodium chloride (PF)  10 mL Intracatheter Q8H       Data   Recent Labs   Lab 07/26/20  0427 07/25/20  2218 07/25/20  1941 07/25/20  1215   WBC 11.6*  --   --  13.3*   HGB 10.2*  --   --  10.6*   MCV 98  --   --  99     --  321 331   INR 1.26*  --  1.29*  --      --   --  135   POTASSIUM 4.3  --   --  4.5   CHLORIDE 108  --   --  106   CO2 19*  --   --  16*   BUN 41*  --   --  34*   CR 4.14*  --   --  3.79*   ANIONGAP 10  --   --  13   MALACHI 7.4*  --   --  8.1*   GLC 88  --   --  140*   ALBUMIN 2.3*  --   --  2.6*   PROTTOTAL 5.8*  --   --  6.8   BILITOTAL 0.7  --   --  0.3   ALKPHOS 102  --   --  127   *  --   --  1,064*   AST 1,332*  --   --  1,950*   TROPI  --  0.073* 0.071* 0.118*       Imaging:   Recent Results (from the past 24 hour(s))   Head CT w/o contrast    Narrative    CT SCAN OF THE HEAD WITHOUT CONTRAST   7/25/2020 3:04 PM     HISTORY: Fall. Pain.    TECHNIQUE:  Axial images of the head and coronal reformations  without  IV contrast material. Radiation dose for this scan was reduced using  automated exposure control, adjustment of the mA and/or kV according  to patient size, or iterative reconstruction technique.    COMPARISON: 22 December 2014 head CT.    FINDINGS: 10 mm calcified lesion arising from the left side of the  anterior falx is redemonstrated. Extra-axial. No change since the  previous exam and typical appearance for meningioma. Minimal localized  mass effect without midline shift or brain edema. Mild, generalized  atrophy and mild chronic small vessel vascular change. The visualized  portions of the sinuses and mastoids appear normal. The bony calvarium  and bones of the skull base appear intact.       Impression    IMPRESSION: Negative for acute intracranial process. No fractures.  Small, stable left frontal parafalcine meningioma redemonstrated.    PORFIRIO GUERRERO MD   US Lower Extremity Venous Duplex Bilateral    Narrative    US LOWER EXTREMITY VENOUS DUPLEX BILATERAL 7/25/2020 3:19 PM    CLINICAL HISTORY/INDICATION: Bilat LE swelling, eval for DVT, elevated  D-dimer, hypotension    COMPARISON: 4/19/2020    TECHNIQUE:   Grayscale, color-flow, and spectral waveform analysis were performed  of the deep veins of the bilateral lower extremities    FINDINGS:   The right common femoral vein, femoral vein, popliteal vein and tibial  veins demonstrate normal compressibility, spectral waveform, color  flow and augmentation.    The left common femoral vein, femoral vein, popliteal vein and tibial  veins demonstrate normal compressibility, spectral waveform, color  flow and augmentation.      Impression    IMPRESSION:   No deep vein thrombosis in either lower extremity.    ELADIA ABRAHAM DO   CT Chest Abdomen Pelvis w/o Contrast    Narrative    CT CHEST, ABDOMEN, AND PELVIS WITHOUT CONTRAST 7/25/2020 3:22 PM     HISTORY: Hypoxia.    COMPARISON: None.    TECHNIQUE: Volumetric helical acquisition of CT images  from the lung  apices through the symphysis pubis without contrast. Radiation dose  for this scan was reduced using automated exposure control, adjustment  of the mA and/or kV according to patient size, or iterative  reconstruction technique.    FINDINGS:     Chest: Minimal right and trace left pleural effusion. No pericardial  effusion. Moderately extensive interstitial and groundglass  infiltrates. A few fissural nodules and subpleural nodules are noted  measuring 5 mm or less.    Abdomen and pelvis: Small amount of pelvic free fluid which is  nonspecific. There are no dilated loops of small intestine or large  bowel to suggest ileus or obstruction. Cholecystectomy change. The  liver, spleen, adrenal glands, kidneys, and pancreas demonstrate no  worrisome focal lesion in the absence of intravenous contrast. There  are mild atherosclerotic changes of the visualized aorta and its  branches. There is no evidence of aortic aneurysm. No diverticulitis.    Survey of the visualized bony structures demonstrates no destructive  bony lesions.      Impression    IMPRESSION:  1. Moderately extensive interstitial and groundglass infiltrates which  are nonspecific.  2. Minimal right and trace left pleural effusion.  3. Small amount of free fluid in the abdomen and pelvis which is  nonspecific.    SUNITA BONILLA MD   NM Lung Scan Perfusion Particulate    Narrative    NM LUNG SCAN PERFUSION PARTICULATE 7/25/2020 4:40 PM    HISTORY: PE suspected, high pretest probability.    COMPARISON: March 31, 2016    TECHNIQUE: Tc-99m MAA injected intravenously for evaluation of  pulmonary perfusion.    DOSE: 3.6mCi Tc99m MAA @ 1615, L arm IV.      FINDINGS: There are no wedge shaped perfusion defects to suggest  pulmonary emboli. No pattern to suggest chronic pulmonary emboli.      Impression    IMPRESSION: Negative perfusion scan.     SUNITA BONILLA MD   US Abdomen Complete Portable    Narrative    EXAM: US ABDOMEN COMPLETE PORTABLE  LOCATION:  [facility]  DATE/TIME: 2020 6:39 PM    INDICATION: Elevated liver function tests, renal disease  COMPARISON: CT earlier today  TECHNIQUE: Complete abdominal ultrasound.    FINDINGS:    GALLBLADDER: Surgically removed.    BILE DUCTS: No biliary dilatation. The common duct measures 8 mm.    LIVER: Dense hepatic steatosis. No focal mass.    RIGHT KIDNEY: Normal size. Normal echogenicity with no hydronephrosis or mass.     LEFT KIDNEY: Normal size. Normal echogenicity with no hydronephrosis or mass.     SPLEEN: Normal.    PANCREAS: The visualized portions are normal.    AORTA: Normal in caliber.     IVC: Normal where visualized.    Trace ascites.      Impression    IMPRESSION:  1.  Dense hepatic steatosis.  2.  Common hepatic duct mildly prominent at 8 mm without intrahepatic biliary dilatation.   Echocardiogram Complete    Narrative    [id number]  [facility]  [id number]  [id number]^JESSICA^MANUELA^REYNOLDS           [facility]  Echocardiography Laboratory  [address] [place] [address]        Name: RONNIE PACHECO  MRN: [id number]  : 1965  Study Date: 2020 08:00 AM  Age: 55 yrs  Gender: Female  Patient Location: [facility]  Reason For Study: Heart Failure  Ordering Physician: MANUELA LOPEZ  Referring Physician: J Carlos Parsons  Performed By: Jourdan Gonzales     BSA: 2.0 m2  Height: 63 in  Weight: 226 lb  HR: 69  BP: 104/67 mmHg  _____________________________________________________________________________  __        Procedure  Complete Portable Echo Adult. Optison (NDC #2717-9649) given intravenously.  _____________________________________________________________________________  __        Interpretation Summary     The visual ejection fraction is estimated at 55-60%.  No regional wall motion abnormalities noted.  The left ventricle is normal in structure, function and size.  The right ventricle is moderately dilated.  Moderately decreased right ventricular systolic  function  There is no pericardial effusion.     Given RV appearance, would consider PE in differential  _____________________________________________________________________________  __        Left Ventricle  The left ventricle is normal in structure, function and size. There is normal  left ventricular wall thickness. The visual ejection fraction is estimated at  55-60%. Left ventricular diastolic function is indeterminate. No regional wall  motion abnormalities noted.     Right Ventricle  The right ventricle is moderately dilated. Moderately decreased right  ventricular systolic function.     Atria  The left atrium is mildly dilated. Right atrial size is normal.     Mitral Valve  There is no mitral regurgitation noted.        Tricuspid Valve  There is mild (1+) tricuspid regurgitation.     Aortic Valve  No aortic regurgitation is present.     Pulmonic Valve  The pulmonic valve is not well visualized.     Vessels  The aortic root is normal size. Normal size ascending aorta.     Pericardium  There is no pericardial effusion.        Rhythm  Sinus rhythm was noted.  _____________________________________________________________________________  __  MMode/2D Measurements & Calculations  IVSd: 1.1 cm     LVIDd: 4.9 cm  LVIDs: 3.7 cm  LVPWd: 1.2 cm  FS: 25.3 %  LV mass(C)d: 210.6 grams  LV mass(C)dI: 103.4 grams/m2  Ao root diam: 3.4 cm  asc Aorta Diam: 2.8 cm  LVOT diam: 2.0 cm  LVOT area: 3.2 cm2  LA Volume (BP): 80.7 ml  LA Volume Index (BP): 39.6 ml/m2  RWT: 0.47           Doppler Measurements & Calculations  MV E max mat: 102.0 cm/sec  MV A max mat: 90.7 cm/sec  MV E/A: 1.1  MV dec slope: 377.0 cm/sec2  PA acc time: 0.10 sec  TR max mat: 274.2 cm/sec  TR max P.1 mmHg  Pulm Sys Mat: 48.0 cm/sec  Pulm Francis Mat: 45.0 cm/sec  Pulm S/D: 1.1  E/E' av.8  Lateral E/e': 10.2  Medial E/e': 15.4           _____________________________________________________________________________  __           Report approved by:  OMAR Boone 07/26/2020 11:29 AM

## 2020-07-26 NOTE — PROGRESS NOTES
07/26/20 1319   Quick Adds   Type of Visit Initial Occupational Therapy Evaluation   Living Environment   Lives With spouse   Living Arrangements house   Home Accessibility no concerns   Transportation Anticipated family or friend will provide   Living Environment Comment pt lives in a single level home w/ no BRAYDEN. pt has a tub/shower   Self-Care   Equipment Currently Used at Home none   Functional Level   Ambulation 0-->independent   Transferring 0-->independent   Toileting 0-->independent   Bathing 0-->independent   Dressing 0-->independent   Eating 0-->independent   Communication 0-->understands/communicates without difficulty   Swallowing 0-->swallows foods/liquids without difficulty   Cognition 0 - no cognition issues reported   Fall history within last six months yes   Number of times patient has fallen within last six months 1   Prior Functional Level Comment PTA, pt ind w/ all ADL's/IADL's. Works for [employer]   General Information   Onset of Illness/Injury or Date of Surgery - Date 07/25/20   Referring Physician Irma Rollins MD    Patient/Family Goals Statement return home   Additional Occupational Profile Info/Pertinent History of Current Problem 54 yo female with a history of multiple mental health issues on a complex medical regimen, tobacco use, and recent empiric treatment of tick borne illness who presented to the [facility] ER on 7/25 with a 2 day history of dyspnea on exertion.  She was found to have acute kidney kinjury and markedly elevated liver enzymes. We suspect her presentation is related to high NSAID use, polypharmacy, and decrease PO intake over the past 3 months. Was in the ICU, transferred out today 7/26. New RV dysfunction on echo.    Precautions/Limitations fall precautions   Cognitive Status Examination   Orientation orientation to person, place and time   Level of Consciousness alert   Follows Commands (Cognition) WFL   Visual Perception   Visual Perception Wears glasses  "  Pain Assessment   Patient Currently in Pain No   Mobility   Bed Mobility Comments ind   Transfer Skill: Sit to Stand   Level of [place]: Sit/Stand stand-by assist   Bathing   Level of [place] stand-by assist   Upper Body Dressing   Level of [place]: Dress Upper Body independent   Lower Body Dressing   Level of [place]: Dress Lower Body stand-by assist   Toileting   Level of [place]: Toilet stand-by assist   Grooming   Level of [place]: Grooming stand-by assist   Eating/Self Feeding   Level of [place]: Eating independent   General Therapy Interventions   Planned Therapy Interventions ADL retraining;IADL retraining;progressive activity/exercise   Clinical Impression   Criteria for Skilled Therapeutic Interventions Met yes, treatment indicated   OT Diagnosis dec ind/safety w/ ADL's and IADL's   Influenced by the following impairments dec functional act tolerance and strength   Assessment of Occupational Performance 1-3 Performance Deficits   Identified Performance Deficits dec ind/ease w/ dressing, bathing, toileting, g/h, IADL's   Clinical Decision Making (Complexity) Low complexity   Therapy Frequency Daily  (1-2 more sessions)   Predicted Duration of Therapy Intervention (days/wks) 4 days   Anticipated Discharge Disposition Home   Risks and Benefits of Treatment have been explained. Yes   Patient, Family & other staff in agreement with plan of care Yes   [facility] TM \"6 Clicks\"   2016, Trustees of [facility], under license to [employer].  All rights reserved.   6 Clicks Short Forms Daily Activity Inpatient Short Form   [facility]  \"6 Clicks\" Daily Activity Inpatient Short Form   1. Putting on and taking off regular lower body clothing? 3 - A Little   2. Bathing (including washing, rinsing, drying)? 3 - A Little   3. Toileting, which includes using toilet, bedpan or urinal? 3 - A Little   4. Putting on and taking off regular upper body " clothing? 4 - None   5. Taking care of personal grooming such as brushing teeth? 3 - A Little   6. Eating meals? 4 - None   Daily Activity Raw Score (Score out of 24.Lower scores equate to lower levels of function) 20   Total Evaluation Time   Total Evaluation Time (Minutes) 8

## 2020-07-26 NOTE — PROVIDER NOTIFICATION
Prescriber Notification Note    The pharmacist has communicated with this patient's provider regarding a concern or therapy recommendation.    Notified Person: Dr. Caputo  Date/Time of Notification: 7/25 21:00  Interaction: phone  Concern/Recommendation: Discussed resuming PTA buspirone. Dr. Rollins gave order earlier this evening to resume buspirone 30mg bid; given renal and hepatic dysfunction will resume buspirone 30mg bid starting Mon 7/27 and await psych consult recommendations.

## 2020-07-26 NOTE — PROGRESS NOTES
Critical Care Progress Note      07/26/2020    Name: Radha Gomez MRN#: [id number]   Age: 55 year old YOB: 1965     Hsptl Day# 1  ICU DAY # 1    MV DAY # NA             Problem List:     PTSD  Bipolar disorder  Chronic Pain  Anxiety  Tobacco use  Dyspnea on exertion  Elevated transaminases  Acute kidney injury  DM 2         Summary/Hospital Course:     Ms. Radha Gomez is a 54yo female with a history of multiple mental health issues on a complex medical regimen, tobacco use, and recent empiric treatment of tick borne illness who presented to the [facility] ER on 7/25 with a 2 day history of dyspnea on exertion.  She was found to have acute kidney kinjury and markedly elevated liver enzymes.  I suspect her presentation is related to high NSAID use, polypharmacy, and decrease PO intake over the past 3 months.   7/25- admitted with multiple lab abnormalities  7/26- slight improvement in LFTs, negative abdominal ultrasound, ECHO pending.  Attempt diuresis (suspect pulmonary edema) with 20mg lasix.  Psych consult to aid with med management.         Assessment and plan :     Radha Gomez IS a 55 year old female admitted on 7/25/2020 for multiple lab abnormalities.     I have personally reviewed the daily labs, imaging studies, cultures and discussed the case with referring physician and consulting physicians.     My assessment and plan by system for this patient is as follows:    Neurology/Psychiatry:   Bipolar disorder  Chronic pain  Tobacco abuse, 1ppd     Home medications:   xanax 0.5 Qday PRN  Buspar 30mg BID  Glycopyrrolate  Norco  Lurasidone 60mg daily   Methadone 10mg daily  Prazosin 10mg HS  Seroquel 300mg HS  Venlafaxine 300mg daily      In a brief med review, I am concerned that several of her above medications may be exacerbating her current issues (LFTs and renal failure), specifically her lurasidone, sertraline (possible increase in LFTs), and venlafaxine.       Plan:   - I will continue her  xanax (daily as needed), methadone, prazosin, and seroquel - no known issues with worsening of renal failure, elevation of LFTs  - holding lurasidone, buspar, sertraline, and venlafaxine for now  - continue methadone/norco  - nicotine patch  - psychiatry consult to aid in med management in the setting of multiple lab abnormalities; I am worried about precipitating a manic episode.     Cardiovascular:   Non obstructive CAD ([facility] 2016)  Elevated Nt-Pro BNP, no recent ECHO   Elevated troponin, no EKG changes, trended down     Plan:   - ECHO is pending  - Volume status is difficult to assess; trial of diuretic this AM due to what I think is pulmonary edema with 20mg lasix.  May require fluid back if this doesn't work.         Pulmonary/Ventilator Management:   Dyspnea on exertion/Acute hypoxic respiratory failure: 2/2 likely volume overload in the setting of renal failure, possible heart failure.  Denies orthopnea.  Currently comfortable on 3-5L NC     GI and Nutrition :   Marked elevation of LFTs: tylenol level negative, no obvious drug toxicity.  Question possible ?decreased PO intake over the past 4 month, possible orthostatic hypotension contributing, in addition to persistent effects from her very complicated medication regimen? CK level normal. On an up to date review, statins, NSAIDS, ASA are possible culprits. I do not think she is in overt liver failure as Tbili, platelets, INR are ok.      Plan:   - complete abdominal ultrasound with dopplers-> negative  - basic hepatitis studies, autoimmune workup pending  - continue to trend LFTs, INR     Renal/Fluids/Electrolytes:   1. APOLLO - yes, in the setting of increased ibuprofen use over the past few weeks, decreased PO intake.  FeNa is 0.6; suspect this is a combination of pre-renal + intrinsic renal due to increased ibuprofen intake  2. Electrolyte abnormality- no  3. Acid/base status- improving, metabolic acidosis 2/2 elevated lactate which has improved  4. Volume  status- appears euvolemic to hypovolemic     Plan  - gentle diuresis today  - monitor function and electrolytes as needed with replacement per ICU protocols. - generally avoid nephrotoxic agents such as NSAID, IV contrast unless specifically required  - adjust medications as needed for renal clearance  - follow I/O's as appropriate.     Infectious Disease:   History of tick bite, negative lyme antibody, s/p empiric treatment with doxycycline (partial?)  COVID ruled out  - no obvious s/sx of infections.  Procal 0.16.  One dose of zosyn given in the ED.  Continue x48 hours pending cultures.      Endocrine:   H/O DM 2 on dulaglutide (injected yesterday), metformin     Plan:   - hold trulicity, metformin  - ICU insulin protocol, goal sugar <180        Hematology/Oncology:   No obvious issues      IV/Access:   1. Venous access - PIV  2. Arterial access - not required           ICU Prophylaxis:   1. DVT: Hep Subq  2. VAP: NA, not vented  3. Stress Ulcer: continue home omeprazole  4. Restraints :not needed  5. Wound care : NA  6. Feeding - clear liquids after ultrasound  7. Family Update: patient updated at bedside  8. Disposition - transfer to hospitalist team today      Key goals for next 24 hours:   1. Await ECHO results  2. Psych consult to aid with medication management   3. Attempt gentle diuresis today  4. Stable for transfer to the hospitalist team today  5. Clear liquids         Interim History:     No acute issues overnight.  Pt endorses sleeping well.  Continues to have shortness of breath.  No fevers overnight.           Key Medications:       [START ON 7/27/2020] busPIRone  30 mg Oral BID     heparin ANTICOAGULANT  5,000 Units Subcutaneous Q8H     methadone  10 mg Oral BID     nicotine  1 patch Transdermal Daily     nicotine   Transdermal Q8H     omeprazole  20 mg Oral Daily     piperacillin-tazobactam  2.25 g Intravenous Q6H     prazosin  10 mg Oral At Bedtime     prazosin  5 mg Oral QAM     QUEtiapine  300  mg Oral At Bedtime     sodium chloride (PF)  10 mL Intracatheter Q8H                 Physical Examination:   Temp:  [97.8  F (36.6  C)-99.5  F (37.5  C)] 99.5  F (37.5  C)  Pulse:  [67-78] 72  Heart Rate:  [69-73] 72  Resp:  [11-36] 23  BP: ()/(52-89) 92/52  SpO2:  [85 %-100 %] 95 %    Intake/Output Summary (Last 24 hours) at 7/26/2020 0605  Last data filed at 7/26/2020 0000  Gross per 24 hour   Intake 1120 ml   Output --   Net 1120 ml     Wt Readings from Last 4 Encounters:   07/25/20 99.7 kg (219 lb 12.8 oz)   09/01/19 108.1 kg (238 lb 6.4 oz)   08/29/19 104.3 kg (230 lb)   05/26/16 111.1 kg (245 lb)     BP - Mean:  [] 70  Resp: 23    Recent Labs   Lab 07/25/20  1235   O2PER 2       GEN: no acute distress   HEENT: head ncat, sclera anicteric, OP patent, trachea midline   PULM: breathing comfortably on nasal cannula.  Some bibasilar crackles appreciated.   CV/COR: RRR S1S2 no gallop,  No rub, no murmur  ABD: soft nontender, hypoactive bowel sounds, no mass  EXT:  Edema   warm  NEURO: grossly intact.  Aox4, moving all 4 extremities without difficulty  SKIN: no obvious rash  LINES: clean, dry intact         Data:   All data and imaging reviewed     ROUTINE ICU LABS (Last four results)  CMP  Recent Labs   Lab 07/26/20  0427 07/25/20  1241 07/25/20  1215     --  135   POTASSIUM 4.3  --  4.5   CHLORIDE 108  --  106   CO2 19*  --  16*   ANIONGAP 10  --  13   GLC 88  --  140*   BUN 41*  --  34*   CR 4.14*  --  3.79*   GFRESTIMATED 11* 11* 13*   GFRESTBLACK 13* 14* 15*   MALACHI 7.4*  --  8.1*   PROTTOTAL 5.8*  --  6.8   ALBUMIN 2.3*  --  2.6*   BILITOTAL 0.7  --  0.3   ALKPHOS 102  --  127   AST 1,332*  --  1,950*   *  --  1,064*     CBC  Recent Labs   Lab 07/26/20  0427 07/25/20  1941 07/25/20  1215   WBC 11.6*  --  13.3*   RBC 3.17*  --  3.34*   HGB 10.2*  --  10.6*   HCT 31.0*  --  33.1*   MCV 98  --  99   MCH 32.2  --  31.7   MCHC 32.9  --  32.0   RDW 15.2*  --  15.5*    321 331      INR  Recent Labs   Lab 07/26/20  0427 07/25/20  1941   INR 1.26* 1.29*     Arterial Blood Gas  Recent Labs   Lab 07/25/20  1235   O2PER 2       All cultures:  Recent Labs   Lab 07/25/20  1328 07/25/20  1311 07/25/20  1235   CULT PENDING No growth after 11 hours No growth after 11 hours     Recent Results (from the past 24 hour(s))   Head CT w/o contrast    Narrative    CT SCAN OF THE HEAD WITHOUT CONTRAST   7/25/2020 3:04 PM     HISTORY: Fall. Pain.    TECHNIQUE:  Axial images of the head and coronal reformations without  IV contrast material. Radiation dose for this scan was reduced using  automated exposure control, adjustment of the mA and/or kV according  to patient size, or iterative reconstruction technique.    COMPARISON: 22 December 2014 head CT.    FINDINGS: 10 mm calcified lesion arising from the left side of the  anterior falx is redemonstrated. Extra-axial. No change since the  previous exam and typical appearance for meningioma. Minimal localized  mass effect without midline shift or brain edema. Mild, generalized  atrophy and mild chronic small vessel vascular change. The visualized  portions of the sinuses and mastoids appear normal. The bony calvarium  and bones of the skull base appear intact.       Impression    IMPRESSION: Negative for acute intracranial process. No fractures.  Small, stable left frontal parafalcine meningioma redemonstrated.    PORFIRIO GUERRERO MD   US Lower Extremity Venous Duplex Bilateral    Narrative    US LOWER EXTREMITY VENOUS DUPLEX BILATERAL 7/25/2020 3:19 PM    CLINICAL HISTORY/INDICATION: Bilat LE swelling, eval for DVT, elevated  D-dimer, hypotension    COMPARISON: 4/19/2020    TECHNIQUE:   Grayscale, color-flow, and spectral waveform analysis were performed  of the deep veins of the bilateral lower extremities    FINDINGS:   The right common femoral vein, femoral vein, popliteal vein and tibial  veins demonstrate normal compressibility, spectral waveform,  color  flow and augmentation.    The left common femoral vein, femoral vein, popliteal vein and tibial  veins demonstrate normal compressibility, spectral waveform, color  flow and augmentation.      Impression    IMPRESSION:   No deep vein thrombosis in either lower extremity.    ELADIA ABRAHAM DO   CT Chest Abdomen Pelvis w/o Contrast    Narrative    CT CHEST, ABDOMEN, AND PELVIS WITHOUT CONTRAST 7/25/2020 3:22 PM     HISTORY: Hypoxia.    COMPARISON: None.    TECHNIQUE: Volumetric helical acquisition of CT images from the lung  apices through the symphysis pubis without contrast. Radiation dose  for this scan was reduced using automated exposure control, adjustment  of the mA and/or kV according to patient size, or iterative  reconstruction technique.    FINDINGS:     Chest: Minimal right and trace left pleural effusion. No pericardial  effusion. Moderately extensive interstitial and groundglass  infiltrates. A few fissural nodules and subpleural nodules are noted  measuring 5 mm or less.    Abdomen and pelvis: Small amount of pelvic free fluid which is  nonspecific. There are no dilated loops of small intestine or large  bowel to suggest ileus or obstruction. Cholecystectomy change. The  liver, spleen, adrenal glands, kidneys, and pancreas demonstrate no  worrisome focal lesion in the absence of intravenous contrast. There  are mild atherosclerotic changes of the visualized aorta and its  branches. There is no evidence of aortic aneurysm. No diverticulitis.    Survey of the visualized bony structures demonstrates no destructive  bony lesions.      Impression    IMPRESSION:  1. Moderately extensive interstitial and groundglass infiltrates which  are nonspecific.  2. Minimal right and trace left pleural effusion.  3. Small amount of free fluid in the abdomen and pelvis which is  nonspecific.    SUNITA BONILLA MD   NM Lung Scan Perfusion Particulate    Narrative    NM LUNG SCAN PERFUSION PARTICULATE 7/25/2020  4:40 PM    HISTORY: PE suspected, high pretest probability.    COMPARISON: March 31, 2016    TECHNIQUE: Tc-99m MAA injected intravenously for evaluation of  pulmonary perfusion.    DOSE: 3.6mCi Tc99m MAA @ 1615, L arm IV.      FINDINGS: There are no wedge shaped perfusion defects to suggest  pulmonary emboli. No pattern to suggest chronic pulmonary emboli.      Impression    IMPRESSION: Negative perfusion scan.     SUNITA BONILLA MD   US Abdomen Complete Portable    Narrative    EXAM: US ABDOMEN COMPLETE PORTABLE  LOCATION: [facility]  DATE/TIME: 7/25/2020 6:39 PM    INDICATION: Elevated liver function tests, renal disease  COMPARISON: CT earlier today  TECHNIQUE: Complete abdominal ultrasound.    FINDINGS:    GALLBLADDER: Surgically removed.    BILE DUCTS: No biliary dilatation. The common duct measures 8 mm.    LIVER: Dense hepatic steatosis. No focal mass.    RIGHT KIDNEY: Normal size. Normal echogenicity with no hydronephrosis or mass.     LEFT KIDNEY: Normal size. Normal echogenicity with no hydronephrosis or mass.     SPLEEN: Normal.    PANCREAS: The visualized portions are normal.    AORTA: Normal in caliber.     IVC: Normal where visualized.    Trace ascites.      Impression    IMPRESSION:  1.  Dense hepatic steatosis.  2.  Common hepatic duct mildly prominent at 8 mm without intrahepatic biliary dilatation.         Billing: This patient is critically ill: No.

## 2020-07-26 NOTE — PROGRESS NOTES
Patient admitted from ED at 1730 via cart.     Neuro: A/OX4   Pulm: LS clear. On 4L via NC  CV: SR w/ BP WNL  : Due to void  GI: NPO except for icechips. BS present. Distended abdomen  Extremities: SBA for mobility.   Skin: intact  Lines: PIVx1  Family: pt updates family herself  Plan: US, echo, labs ordered. Monitor and support.

## 2020-07-26 NOTE — CONSULTS
GASTROENTEROLOGY CONSULTATION      Radha Gomez  55 year old female  Admission Date: 7/25/2020  Care Provider: J Carlos Parsons was asked to see this patient in consultation by Dr. Joshi for evaluation of elevated LFTs.    HPI:  Radha Gomez is a 55 year old female who was admitted with shortness of breath and dizziness, and found to have elevated LFTs.    She denies any past history of liver disease.  For the past 4-5 weeks, she has had nausea and intermittent vomiting 3-4x per week.  She brings up bile, states this is not related to eating, and denies blood.  She also has had pain across her upper abdomen, which she attributes to her vomiting.  She denies heartburn or acid reflux, but does note some trouble swallowing rice and pasta lately.  She has chronic constipation, and denies diarrhea or blood in her stools.  No change in the color of her stool or urine.  She notes that her appetite is poor, and she has lost 20 lbs since these symptoms began.    She does take aspirin 81 mg per day, and ibuprofen 1600 mg per day.     MEDICAL HISTORY:  Patient Active Problem List    Diagnosis Date Noted     Shortness of breath 07/25/2020     Priority: Medium     Chronic pain -- on Methadone 08/31/2019     Priority: Medium     DM 2 -- Hgb A1C 6.0 on 5/29/19 08/31/2019     Priority: Medium     Tooth abscess -- S/P 2 teeth extracted 8/30/19 08/30/2019     Priority: Medium     HTN (hypertension)      Priority: Medium     mild       Coronary artery disease      Priority: Medium     non-obstructive, per cath 4/2016       HLD (hyperlipidemia)      Priority: Medium     Chest pain      Priority: Medium     Palpitations      Priority: Medium     Anxiety      Priority: Medium     PTSD (post-traumatic stress disorder)      Priority: Medium     Borderline personality disorder (H)      Priority: Medium     Bipolar 1 disorder (H)      Priority: Medium     Interstitial cystitis      Priority: Medium     Depressive episode  04/21/2015     Priority: Medium     Depression 07/31/2014     Priority: Medium     Major depressive disorder, recurrent episode, severe (H) 06/24/2014     Priority: Medium     Problem list name updated by automated process. Provider to review       Depression (emotion) 06/23/2014     Priority: Medium     A comprehensive ten point review of systems was performed and was negative aside from headaches.       :   Prior to Admission Medications   Prescriptions Last Dose Informant Patient Reported? Taking?   ALPRAZolam (XANAX) 0.25 MG tablet Past Week at Unknown time  Yes Yes   Sig: Take 1.5 mg by mouth daily as needed for anxiety 0.25 mg x 6 tablets   HYDROcodone-acetaminophen (NORCO) 5-325 MG per tablet 7/25/2020 at am Spouse/Significant Other Yes Yes   Sig: Take 2 tablets by mouth 3 times daily as needed for moderate to severe pain    QUEtiapine (SEROQUEL) 300 MG tablet 7/24/2020 at pm  Yes Yes   Sig: Take 300 mg by mouth every evening   aspirin 81 MG EC tablet 7/25/2020 at am  No Yes   Sig: Take 1 tablet (81 mg) by mouth daily   atorvastatin (LIPITOR) 40 MG tablet 7/25/2020 at am  Yes Yes   Sig: Take 40 mg by mouth daily   busPIRone HCl (BUSPAR) 30 MG tablet 7/25/2020 at am  Yes Yes   Sig: Take by mouth 2 times daily    dulaglutide (TRULICITY) 0.75 MG/0.5ML pen 7/24/2020  Yes Yes   Sig: Inject 0.75 mg Subcutaneous every 7 days   glycopyrrolate (GLYCATE) 2 MG tablet 7/25/2020 at am  Yes Yes   Sig: Take 4 mg by mouth every morning 2 x 2mg   glycopyrrolate (GLYCATE) 2 MG tablet 7/24/2020 at am  Yes Yes   Sig: Take 2 mg by mouth every evening   ibuprofen (ADVIL/MOTRIN) 800 MG tablet 7/25/2020 at am  Yes Yes   Sig: Take 800 mg by mouth every morning   ibuprofen (ADVIL/MOTRIN) 800 MG tablet Past Week at am  Yes Yes   Sig: Take 800 mg by mouth daily as needed for moderate pain   lurasidone (LATUDA) 60 MG TABS tablet 7/24/2020 at pm  Yes Yes   Sig: Take 60 mg by mouth every evening    metFORMIN (GLUCOPHAGE) 1000 MG tablet  2020 at am  Yes Yes   Sig: Take 1,000 mg by mouth 2 times daily (with meals)   methadone (DOLOPHINE) 10 MG tablet 2020 at am  Yes Yes   Sig: Take 10 mg by mouth 2 times daily   omeprazole (PRILOSEC) 20 MG DR capsule 2020 at am  Yes Yes   Sig: Take 20 mg by mouth daily OR ZANTAC   phenazopyridine (PYRIDIUM) 200 MG tablet More than a month at Unknown time  Yes No   Sig: Take 200 mg by mouth 3 times daily as needed for irritation   prazosin (MINIPRESS) 5 MG capsule 2020 at pm  Yes Yes   Sig: Take 10 mg by mouth At Bedtime   prazosin (MINIPRESS) 5 MG capsule 2020 at am  Yes Yes   Sig: Take 5 mg by mouth every morning   propranolol ER (INDERAL LA) 80 MG 24 hr capsule 2020 at am  Yes Yes   Sig: Take 80 mg by mouth daily   ranitidine (ZANTAC) 150 MG tablet Not taking  Yes No   Sig: Take 150 mg by mouth 2 times daily OR OMEPRAZOLE   venlafaxine (EFFEXOR-XR) 150 MG 24 hr capsule 2020 at am  Yes Yes   Sig: Take 300 mg by mouth daily      Facility-Administered Medications: None      :   Allergies   Allergen Reactions     Abilify [Aripiprazole] Cramps     Total body- couldn't walk     Paxil [Paroxetine] Other (See Comments)     Total body pain     Contrast Dye Swelling     Ditropan [Oxybutynin Chloride]      Can't Urinate, Per Pt.     Wellbutrin [Bupropion] Palpitations     If not XL      Social History:  Social History     Tobacco Use     Smoking status: Former Smoker     Packs/day: 1.00     Years: 35.00     Pack years: 35.00     Types: Cigarettes     Last attempt to quit: 2012     Years since quittin.0     Smokeless tobacco: Never Used   Substance Use Topics     Alcohol use: None for the past 1 month     Drug use: No       Family History:  No first degree relative with colon cancer, gastric cancer, crohn's disease, ulcerative colitis, or liver disease.     EXAM:  General Appearance: Alert, oriented x3, no acute distress.  BP 95/64   Pulse 69   Temp 98.3  F (36.8  C) (Axillary)    "Resp 14   Ht 1.6 m (5' 3\")   Wt 102.6 kg (226 lb 1.6 oz)   SpO2 98%   BMI 40.05 kg/m    Eye:  PERRL, sclera anicteric.  ENT: oropharynx clear, no thrush.  CV: RRR.  Resp: CTA bilaterally.  GI: Soft, NABS, mild upper abdominal tenderness without rebound.  Musculoskeletal: no joint swelling, erythema, or warmth.  Neuro: no asterixis.      Labs and imaging reviewed    Recent Labs   Lab Test 07/26/20  0427 07/25/20  1941 07/25/20  1215 08/31/19  0908 08/30/19  1638   WBC 11.6*  --  13.3* 6.6 8.2   HGB 10.2*  --  10.6* 10.5* 11.0*   MCV 98  --  99 89 88    321 331 253 256   INR 1.26* 1.29*  --   --  0.91     Recent Labs   Lab Test 07/26/20 0427 07/25/20 1215 08/31/19  0908   POTASSIUM 4.3 4.5 4.0   CHLORIDE 108 106 108   CO2 19* 16* 27   BUN 41* 34* 9   ANIONGAP 10 13 5     Recent Labs   Lab Test 07/26/20  0427 07/25/20  1327 07/25/20  1215 08/30/19  1638  04/21/15  1610  01/14/15  1910  06/23/13  1620   ALBUMIN 2.3*  --  2.6* 3.2*  --   --    < >  --    < > 4.1   BILITOTAL 0.7  --  0.3 0.2  --   --    < >  --    < > 0.4   *  --  1,064* 24   < >  --    < >  --    < > 86*   AST 1,332*  --  1,950* 16  --   --    < >  --    < > 171*   PROTEIN  --  30*  --   --   --  Negative  --  Negative   < >  --    LIPASE  --   --   --   --   --   --   --   --   --  82    < > = values in this interval not displayed.       ASSESSMENT AND PLAN:  55 year old female with marked elevation in transaminases, with preserved liver function.  Differential includes viral, autoimmune, Wilsons (unlikely given her age), and drug induced liver injury, most likely from ibuprofen or sertraline.  Ischemia could also present like this, although she has not had a documented hypotensive episode.    -Agree with viral hepatitis panel, DYLAN as you are.  -Hold ibuprofen, sertraline.  -LFTs improved today, continue to monitor  -Will follow    Thank you for this interesting consult, please feel to call me if any questions arise.    Ean Castillo, " MD

## 2020-07-26 NOTE — PLAN OF CARE
Pt A+Ox4. VSS on 5L NC. Hypotensive when sleeping with stable MAP >65. Pain controlled using PRN medication. Moving IND in bed with SBA OOB. Dyspnea on exertion. Glucose stable. CMS intact. Neuro intact. Denies nausea. Will continue to monitor.
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
## 2020-07-26 NOTE — PROGRESS NOTES
Transfer    S- Transfer to [facility]- from ICU.    B- Pt presented to ED for evaluation after experiencing some generalized weakness, cough over a couple weeks. Pt thought it was related to a tick bite that she was taking antibiotics for. Pt had abnormal labs and was hypoxic in ED. Pt was transferred to ICU for continuation of care.     A- Brief systems assessment: Pt is alert and oriented x4, pt's respirations are equal and non-labored at rest. Pt denies SOB or CP. Pt is satting mid 90s on 4L. Pt's skin is warm to the touch and capillary refill remains less than 3 seconds. Pt's belongings acommpanied her to room and are placed at her bedside. Pt denies any want for any belongings to be stored with security. Pt placed on telemetry monitoring and vital signs taken; VSS on 4L. IV Heparin infusing at time of transfer. Nursing will continue to monitor.     R- Transfer to inpatient medical tele bed per physician orders. Continue to monitor pt and update physician as needed.      Code status: Full Code, appropriate code band placed on pt.  Skin: No open areas noted, no other concerns. 1 PIV infusing at time of transfer.   Fall Risk: Pt reported dizziness at admission, placed on SBA with bed alarm d/t IV pole and portable oxygen tank.   Isolation: None  Patient belongings: accompanied pt and at bedside.   Medication drips upon transfer: IV Heparin  Bedside Report Letter Given and explained to pt: NA

## 2020-07-26 NOTE — PLAN OF CARE
Discharge Planner OT   Patient plan for discharge: home  Current status: OT eval/tx initiated. Pt lives w/  and is typically ind w/ all ADL's/IADL's, works for [employer].     Currently, pt mod I w/ bed mob. SBA for ambulation, transfers, toileting, and g/h at sink. Pt alert and oriented to all domains. Will complete further evaluation once pt out of ICU however only 1 additional session anticipated.  Barriers to return to prior living situation: none anticipated functionally  Recommendations for discharge: home  Rationale for recommendations: pt is already mobilizing well in ICU. Anticipate w/ continued medical mgmt that pt will be able to safely return home when medically able to discharge.        Entered by: Chrissy Rondon 07/26/2020 2:13 PM

## 2020-07-26 NOTE — PLAN OF CARE
PT:  Attempted to see this AM.  Spoke with nursing who stated patient is up with SBA and may not have PT needs but wants PT to talk with patient.  Currently patient having ECHO.  Defer PT at this time.

## 2020-07-27 ENCOUNTER — APPOINTMENT (OUTPATIENT)
Dept: OCCUPATIONAL THERAPY | Facility: CLINIC | Age: 55
DRG: 314 | End: 2020-07-27
Payer: COMMERCIAL

## 2020-07-27 LAB
ALBUMIN SERPL-MCNC: 2.9 G/DL (ref 3.4–5)
ALP SERPL-CCNC: 90 U/L (ref 40–150)
ALT SERPL W P-5'-P-CCNC: 673 U/L (ref 0–50)
ANION GAP SERPL CALCULATED.3IONS-SCNC: 6 MMOL/L (ref 3–14)
AST SERPL W P-5'-P-CCNC: 432 U/L (ref 0–45)
BACTERIA SPEC CULT: ABNORMAL
BASOPHILS # BLD AUTO: 0 10E9/L (ref 0–0.2)
BASOPHILS NFR BLD AUTO: 0.3 %
BILIRUB SERPL-MCNC: 0.3 MG/DL (ref 0.2–1.3)
BUN SERPL-MCNC: 28 MG/DL (ref 7–30)
CALCIUM SERPL-MCNC: 7.8 MG/DL (ref 8.5–10.1)
CHLORIDE SERPL-SCNC: 109 MMOL/L (ref 94–109)
CO2 SERPL-SCNC: 24 MMOL/L (ref 20–32)
CREAT SERPL-MCNC: 1.61 MG/DL (ref 0.52–1.04)
DIFFERENTIAL METHOD BLD: ABNORMAL
EOSINOPHIL # BLD AUTO: 0.4 10E9/L (ref 0–0.7)
EOSINOPHIL NFR BLD AUTO: 3.9 %
ERYTHROCYTE [DISTWIDTH] IN BLOOD BY AUTOMATED COUNT: 14.8 % (ref 10–15)
GFR SERPL CREATININE-BSD FRML MDRD: 35 ML/MIN/{1.73_M2}
GLUCOSE BLDC GLUCOMTR-MCNC: 101 MG/DL (ref 70–99)
GLUCOSE BLDC GLUCOMTR-MCNC: 108 MG/DL (ref 70–99)
GLUCOSE BLDC GLUCOMTR-MCNC: 115 MG/DL (ref 70–99)
GLUCOSE BLDC GLUCOMTR-MCNC: 121 MG/DL (ref 70–99)
GLUCOSE BLDC GLUCOMTR-MCNC: 159 MG/DL (ref 70–99)
GLUCOSE SERPL-MCNC: 93 MG/DL (ref 70–99)
HAV IGM SERPL QL IA: NONREACTIVE
HBV CORE AB SERPL QL IA: NONREACTIVE
HBV SURFACE AB SERPL IA-ACNC: 0.21 M[IU]/ML
HBV SURFACE AG SERPL QL IA: NONREACTIVE
HCT VFR BLD AUTO: 27.1 % (ref 35–47)
HCV AB SERPL QL IA: NONREACTIVE
HGB BLD-MCNC: 9.1 G/DL (ref 11.7–15.7)
IMM GRANULOCYTES # BLD: 0.2 10E9/L (ref 0–0.4)
IMM GRANULOCYTES NFR BLD: 1.5 %
INR PPP: 1.21 (ref 0.86–1.14)
LMWH PPP CHRO-ACNC: 0.62 IU/ML
LMWH PPP CHRO-ACNC: 0.68 IU/ML
LMWH PPP CHRO-ACNC: 0.72 IU/ML
LYMPHOCYTES # BLD AUTO: 2.4 10E9/L (ref 0.8–5.3)
LYMPHOCYTES NFR BLD AUTO: 23.9 %
Lab: ABNORMAL
MCH RBC QN AUTO: 31.8 PG (ref 26.5–33)
MCHC RBC AUTO-ENTMCNC: 33.6 G/DL (ref 31.5–36.5)
MCV RBC AUTO: 95 FL (ref 78–100)
MONOCYTES # BLD AUTO: 0.6 10E9/L (ref 0–1.3)
MONOCYTES NFR BLD AUTO: 6.3 %
NEUTROPHILS # BLD AUTO: 6.5 10E9/L (ref 1.6–8.3)
NEUTROPHILS NFR BLD AUTO: 64.1 %
NRBC # BLD AUTO: 0.1 10*3/UL
NRBC BLD AUTO-RTO: 1 /100
PLATELET # BLD AUTO: 296 10E9/L (ref 150–450)
POTASSIUM SERPL-SCNC: 3.2 MMOL/L (ref 3.4–5.3)
POTASSIUM SERPL-SCNC: 3.6 MMOL/L (ref 3.4–5.3)
PROT SERPL-MCNC: 6.2 G/DL (ref 6.8–8.8)
RBC # BLD AUTO: 2.86 10E12/L (ref 3.8–5.2)
SODIUM SERPL-SCNC: 139 MMOL/L (ref 133–144)
SPECIMEN SOURCE: ABNORMAL
WBC # BLD AUTO: 10.2 10E9/L (ref 4–11)

## 2020-07-27 PROCEDURE — 99233 SBSQ HOSP IP/OBS HIGH 50: CPT | Performed by: INTERNAL MEDICINE

## 2020-07-27 PROCEDURE — 25000132 ZZH RX MED GY IP 250 OP 250 PS 637: Performed by: PHYSICIAN ASSISTANT

## 2020-07-27 PROCEDURE — 85520 HEPARIN ASSAY: CPT | Performed by: INTERNAL MEDICINE

## 2020-07-27 PROCEDURE — 84132 ASSAY OF SERUM POTASSIUM: CPT | Performed by: INTERNAL MEDICINE

## 2020-07-27 PROCEDURE — 97530 THERAPEUTIC ACTIVITIES: CPT | Mod: GO | Performed by: OCCUPATIONAL THERAPIST

## 2020-07-27 PROCEDURE — 12000000 ZZH R&B MED SURG/OB

## 2020-07-27 PROCEDURE — 80053 COMPREHEN METABOLIC PANEL: CPT | Performed by: INTERNAL MEDICINE

## 2020-07-27 PROCEDURE — 85027 COMPLETE CBC AUTOMATED: CPT | Performed by: INTERNAL MEDICINE

## 2020-07-27 PROCEDURE — 36415 COLL VENOUS BLD VENIPUNCTURE: CPT | Performed by: INTERNAL MEDICINE

## 2020-07-27 PROCEDURE — 25000128 H RX IP 250 OP 636: Performed by: PHYSICIAN ASSISTANT

## 2020-07-27 PROCEDURE — 85610 PROTHROMBIN TIME: CPT | Performed by: INTERNAL MEDICINE

## 2020-07-27 PROCEDURE — 25000128 H RX IP 250 OP 636: Performed by: INTERNAL MEDICINE

## 2020-07-27 PROCEDURE — 99232 SBSQ HOSP IP/OBS MODERATE 35: CPT | Performed by: INTERNAL MEDICINE

## 2020-07-27 PROCEDURE — 00000146 ZZHCL STATISTIC GLUCOSE BY METER IP

## 2020-07-27 PROCEDURE — 25000132 ZZH RX MED GY IP 250 OP 250 PS 637: Performed by: INTERNAL MEDICINE

## 2020-07-27 PROCEDURE — 85025 COMPLETE CBC W/AUTO DIFF WBC: CPT | Performed by: INTERNAL MEDICINE

## 2020-07-27 PROCEDURE — 25000132 ZZH RX MED GY IP 250 OP 250 PS 637

## 2020-07-27 RX ORDER — CEFTRIAXONE 1 G/1
1 INJECTION, POWDER, FOR SOLUTION INTRAMUSCULAR; INTRAVENOUS EVERY 24 HOURS
Status: DISCONTINUED | OUTPATIENT
Start: 2020-07-27 | End: 2020-07-29 | Stop reason: HOSPADM

## 2020-07-27 RX ORDER — POTASSIUM CHLORIDE 1500 MG/1
20-40 TABLET, EXTENDED RELEASE ORAL
Status: DISCONTINUED | OUTPATIENT
Start: 2020-07-27 | End: 2020-07-29 | Stop reason: HOSPADM

## 2020-07-27 RX ORDER — POTASSIUM CL/LIDO/0.9 % NACL 10MEQ/0.1L
10 INTRAVENOUS SOLUTION, PIGGYBACK (ML) INTRAVENOUS
Status: DISCONTINUED | OUTPATIENT
Start: 2020-07-27 | End: 2020-07-29 | Stop reason: HOSPADM

## 2020-07-27 RX ORDER — FUROSEMIDE 10 MG/ML
20 INJECTION INTRAMUSCULAR; INTRAVENOUS ONCE
Status: COMPLETED | OUTPATIENT
Start: 2020-07-27 | End: 2020-07-27

## 2020-07-27 RX ORDER — POTASSIUM CHLORIDE 1500 MG/1
40 TABLET, EXTENDED RELEASE ORAL ONCE
Status: COMPLETED | OUTPATIENT
Start: 2020-07-27 | End: 2020-07-27

## 2020-07-27 RX ORDER — PIPERACILLIN SODIUM, TAZOBACTAM SODIUM 3; .375 G/15ML; G/15ML
3.38 INJECTION, POWDER, LYOPHILIZED, FOR SOLUTION INTRAVENOUS EVERY 6 HOURS
Status: DISCONTINUED | OUTPATIENT
Start: 2020-07-27 | End: 2020-07-27

## 2020-07-27 RX ORDER — POTASSIUM CHLORIDE 7.45 MG/ML
10 INJECTION INTRAVENOUS
Status: DISCONTINUED | OUTPATIENT
Start: 2020-07-27 | End: 2020-07-29 | Stop reason: HOSPADM

## 2020-07-27 RX ORDER — POTASSIUM CHLORIDE 1.5 G/1.58G
20-40 POWDER, FOR SOLUTION ORAL
Status: DISCONTINUED | OUTPATIENT
Start: 2020-07-27 | End: 2020-07-29 | Stop reason: HOSPADM

## 2020-07-27 RX ORDER — POTASSIUM CHLORIDE 29.8 MG/ML
20 INJECTION INTRAVENOUS
Status: DISCONTINUED | OUTPATIENT
Start: 2020-07-27 | End: 2020-07-29 | Stop reason: HOSPADM

## 2020-07-27 RX ADMIN — FUROSEMIDE 20 MG: 10 INJECTION, SOLUTION INTRAVENOUS at 09:22

## 2020-07-27 RX ADMIN — POTASSIUM CHLORIDE 40 MEQ: 1500 TABLET, EXTENDED RELEASE ORAL at 09:22

## 2020-07-27 RX ADMIN — BUSPIRONE HYDROCHLORIDE 30 MG: 15 TABLET ORAL at 20:11

## 2020-07-27 RX ADMIN — POTASSIUM CHLORIDE 20 MEQ: 1500 TABLET, EXTENDED RELEASE ORAL at 15:05

## 2020-07-27 RX ADMIN — PIPERACILLIN SODIUM AND TAZOBACTAM SODIUM 3.38 G: 3; .375 INJECTION, POWDER, LYOPHILIZED, FOR SOLUTION INTRAVENOUS at 08:38

## 2020-07-27 RX ADMIN — OMEPRAZOLE 20 MG: 20 CAPSULE, DELAYED RELEASE ORAL at 08:40

## 2020-07-27 RX ADMIN — HEPARIN SODIUM 1700 UNITS/HR: 10000 INJECTION, SOLUTION INTRAVENOUS at 04:47

## 2020-07-27 RX ADMIN — HYDROCODONE BITARTRATE AND ACETAMINOPHEN 2 TABLET: 5; 325 TABLET ORAL at 15:10

## 2020-07-27 RX ADMIN — CEFTRIAXONE 1 G: 1 INJECTION, POWDER, FOR SOLUTION INTRAMUSCULAR; INTRAVENOUS at 15:05

## 2020-07-27 RX ADMIN — METHADONE HYDROCHLORIDE 10 MG: 10 TABLET ORAL at 20:09

## 2020-07-27 RX ADMIN — HEPARIN SODIUM 1650 UNITS/HR: 10000 INJECTION, SOLUTION INTRAVENOUS at 20:09

## 2020-07-27 RX ADMIN — PIPERACILLIN SODIUM AND TAZOBACTAM SODIUM 3.38 G: 3; .375 INJECTION, POWDER, LYOPHILIZED, FOR SOLUTION INTRAVENOUS at 14:13

## 2020-07-27 RX ADMIN — PRAZOSIN HYDROCHLORIDE 5 MG: 5 CAPSULE ORAL at 08:40

## 2020-07-27 RX ADMIN — PRAZOSIN HYDROCHLORIDE 10 MG: 5 CAPSULE ORAL at 21:46

## 2020-07-27 RX ADMIN — QUETIAPINE 300 MG: 300 TABLET, FILM COATED ORAL at 21:46

## 2020-07-27 RX ADMIN — BUSPIRONE HYDROCHLORIDE 30 MG: 15 TABLET ORAL at 08:40

## 2020-07-27 RX ADMIN — PIPERACILLIN SODIUM AND TAZOBACTAM SODIUM 2.25 G: 2; .25 INJECTION, POWDER, LYOPHILIZED, FOR SOLUTION INTRAVENOUS at 02:29

## 2020-07-27 RX ADMIN — FUROSEMIDE 20 MG: 10 INJECTION, SOLUTION INTRAVENOUS at 14:12

## 2020-07-27 RX ADMIN — METHADONE HYDROCHLORIDE 10 MG: 10 TABLET ORAL at 08:40

## 2020-07-27 ASSESSMENT — MIFFLIN-ST. JEOR: SCORE: 1573.37

## 2020-07-27 ASSESSMENT — ACTIVITIES OF DAILY LIVING (ADL)
ADLS_ACUITY_SCORE: 15
ADLS_ACUITY_SCORE: 16

## 2020-07-27 NOTE — PLAN OF CARE
DATE & TIME: 7/27/20 ([id number])  Cognitive Concerns/ Orientation : A&Ox4   BEHAVIOR & AGGRESSION TOOL COLOR: Green  CIWA SCORE: NA   ABNL VS/O2:4 liters oxygen, humidification, No O2 @ baseline, RA saturations in 80's  MOBILITY: SBA, ambulating to bathroom   PAIN MANAGMENT: Chronic pain: Hx of arthritis. Methadone scheduled given.   DIET: Regular, poor appetite   BOWEL/BLADDER: Continent: constipated  ABNL LAB/BG: Creat 1.61, Hemoglobin 9.1, , , K 3.2 (started on replacement protocol this afternoon, 40 meq given so far)  DRAIN/DEVICES: 2 PIV: Rt AC infusing Heparin infusing 1650u/hr (recheck level 1930)  TELEMETRY RHYTHM: NSR  SKIN: Intact  TESTS/PROCEDURES: Cardiology consulted. Unable to obtain CT d/t kidney function; see cardiology note, may do MRI.   D/C DAY/GOALS/PLACE: Pending  OTHER IMPORTANT INFO: Pt denies SOB, lungs clear diminished.  IV Lasix given x2 with good urine output.

## 2020-07-27 NOTE — PROVIDER NOTIFICATION
"Hospitalist (Jackie) Paged: unit: [phone] Room: [facility]. Morning labs done early. K is 3.2. no protocols in. Would you like to address it or leave it for the am rounder.    Per Dr. Mejia: \"Leave for AM rounder\"  "

## 2020-07-27 NOTE — PROGRESS NOTES
[facility] Cardiology Progress Note  Date of Service: 07/27/2020  Primary Cardiologist: Will be Dr. Lopez.    Radha Gomez is a 55 year old female with hx of bipolar disorder on chronic methadone, tobacco use, recent tx for tick-borne illness, HTN and DMII, who was admitted on 7/25/2020 with progressive dyspnea, found to have hypoxic respiratory failure and multiple lab dyscrasias. Cardiology was consulted for abnormal TTE.    Subjective: Feels breathing is slowly improving. Denies GUSMAN and orthopnea. Complains of throat irritation and cough, requiring 4L O2 at rest.     Assessment:  1. Acute hypoxic respiratory failure of unclear etiology - Interstitial and groundglass infiltrates noted on CT. New RV dilitation / dysfn noted on TTE. D-dimer 18. V/Q scan low probability. US LE's neg for DVT.    - Placed on heparin to cover for potential PE while renal fn improves and confirmatory testing can be done.   - proBNP 27,000. IV Lasix 20 mg x 1 administered 7/26.   - No arrhythmias on tele.   2. APOLLO / ATN / transaminitis - felt from NSAID use. Marked improvement in creatinine overnight, from 4.1 --> 1.6.   3. Slight troponin rise, suspect type II. No chest pain. Had normal cath in 2016 done for chest discomfort along with equivocal stress test.  4. New iRBBB  5. Hypoalbuminemia  6. Allergy to contrast dye    Plan:   1. Continue gentle diuresis with IV Lasix 20 mg 1-2x daily.  2. Anticoagulation recommendations per primary service.   3. Repeat TTE once other organ dysfunction resolves. Per EP, if RV dilation persists, then we may consider obtaining MRI to look for evidence of ARVD in light of incomplete RBBB noted on ECG.    Jes Miranda PA-C  [facility]  Pager: [phone]  Text Page  (7:30am - 4pm M-F)   __________________________________________________________________________    Physical Exam   Temp: 98.9  F (37.2  C) Temp src: Oral BP: 135/73 Pulse: 63 Heart Rate: 72 Resp:  16 SpO2: 96 % O2 Device: Nasal cannula Oxygen Delivery: 3 LPM  Vitals:    07/25/20 1732 07/26/20 0600 07/27/20 0700   Weight: 99.7 kg (219 lb 12.8 oz) 102.6 kg (226 lb 1.6 oz) 101.4 kg (223 lb 9.6 oz)       GENERAL:  The patient is in no apparent distress.   NECK: CVP adifficult to assess due to body habitus.  PULMONARY:  There is a normal respiratory effort. Bibasilar crackles, R>L.  CARDIOVASCULAR:  RRR, normal S1 S2, no m/r/g.  GI:  Non tender abdomen with normoactive bowel sounds and no hepatosplenomegaly. There are no masses palpable.   EXTREMITIES:  No clubbing, cyanosis or edema.  VASCULAR: 2+ Pulses bilaterally in upper and lower extremities.    Medications     HEParin 1,700 Units/hr (07/27/20 0447)       busPIRone  30 mg Oral BID     methadone  10 mg Oral BID     nicotine  1 patch Transdermal Daily     nicotine   Transdermal Q8H     omeprazole  20 mg Oral Daily     piperacillin-tazobactam  3.375 g Intravenous Q6H     prazosin  10 mg Oral At Bedtime     prazosin  5 mg Oral QAM     QUEtiapine  300 mg Oral At Bedtime     sodium chloride (PF)  10 mL Intracatheter Q8H       Data   Most Recent 3 CBC's:  Recent Labs   Lab Test 07/27/20  0406 07/26/20  1420 07/26/20  0427   WBC 10.2 9.9 11.6*   HGB 9.1* 9.6* 10.2*   MCV 95 98 98    289 305     Most Recent 3 BMP's:  Recent Labs   Lab Test 07/27/20  0406 07/26/20  0427 07/25/20  1215    137 135   POTASSIUM 3.2* 4.3 4.5   CHLORIDE 109 108 106   CO2 24 19* 16*   BUN 28 41* 34*   CR 1.61* 4.14* 3.79*   ANIONGAP 6 10 13   MALACHI 7.8* 7.4* 8.1*   GLC 93 88 140*     Most Recent 2 LFT's:  Recent Labs   Lab Test 07/27/20  0406 07/26/20  0427   * 1,332*   * 987*   ALKPHOS 90 102   BILITOTAL 0.3 0.7     Most Recent 3 Troponin's:  Recent Labs   Lab Test 07/25/20  2218 07/25/20  1941 07/25/20  1215   TROPI 0.073* 0.071* 0.118*     Most Recent 3 BNP's:  Recent Labs   Lab Test 07/25/20  1215   NTBNPI 27,021*     Most Recent D-dimer:  Recent Labs   Lab Test  07/25/20  1215   DD 18.2*     Most Recent Cholesterol Panel:  Recent Labs   Lab Test 05/26/16  0900   CHOL 105   LDL 37   HDL 38*   TRIG 148     Most Recent TSH and T4:  Recent Labs   Lab Test 03/31/16  1225  06/24/14  1110   TSH 1.22   < > 1.21   T4  --   --  1.45    < > = values in this interval not displayed.     Most Recent ABG:No lab results found.

## 2020-07-27 NOTE — PLAN OF CARE
Discharge Planner PT   Patient plan for discharge: See OT note  Current status:     Eval orders received, chart reviewed. OT initiated mobility with pt. Per discussion with OT, no skilled PT needs as pt mobilizing near baseline. OT to follow to address mobility needs during IP stay. PT will complete orders     Barriers to return to prior living situation: Defer to OT   Recommendations for discharge: Defer to OT   Rationale for recommendations: See above            Entered by: Sindi Barber 07/27/2020 2:46 PM

## 2020-07-27 NOTE — PROGRESS NOTES
APOLLO rapidly resolving.  Prerenal + NSAID effect.      No new suggestions.      Freddie Bhatti MD

## 2020-07-27 NOTE — PROGRESS NOTES
[facility]  Hospitalist Progress Note  Gavino Mix MD  07/27/2020    Assessment & Plan   54 yo female with a history of multiple mental health issues on a complex medical regimen, tobacco use, and recent empiric treatment of tick borne illness who presented to the [facility] ER on 7/25 with a 2 day history of dyspnea on exertion.  She was found to have acute kidney kinjury and markedly elevated liver enzymes. We suspect her presentation is related to high NSAID use, polypharmacy, and decrease PO intake over the past 3 months. Was in the ICU, transferred out today 7/26. New RV dysfunction on echo.      SOB / GUSMAN  Suspected PE  Elevated Nt-Pro BNP   Trivial Troponin elevation  - ECHO shows moderate RV dysfunction.  We recommended including pulmonary emboli in differential.  Moderate RV dysfunction and some dilation.   -- continued on IV heparin since admission awaiting more definitive CT PE but on hold due to APOLLO.    -- Was given diuretics on 7/26, became hypotensive, and was given back 500 cc of normal saline and became normotensive.   -- now getting another dose of iv lasix, she has put out -2L thus far.  -- also noted in setting of RBBB to rule out ARVD, if echo fails to improve to consider CMR    APOLLO (NSAID + pre-renal)  Acute hepatic abnormality (drug effect)?   Hypokalemia secondary to diuresis   -- LFTs, improving, negative abdominal ultrasound,  -- Psych consult to aid with med management.   concerned that several of her above medications may be exacerbating her current issues (LFTs and renal failure), specifically her lurasidone, sertraline (possible increase in LFTs), and venlafaxine.    -- Psychiatry has been consulted. Have recommended changes in holding her Latuda and Effexor.   -- will place on K replacement protocol as she has good diuresis and urine output     History of Tick Bite  -History of tick bite, negative lyme antibody, s/p empiric treatment with doxycycline (partial?)  -no  obvious s/sx of infections.  Procal 0.16.  One dose of zosyn given in the ED.  Continue x48 hours pending cultures.     Hx of interstitial cystitis  Abnormal UA  -- she has chronic pain as a result of her interstitial cystitis and sees a pain MD  -- I am not sure if she is able to tell if she has a UTI or not due to chronic pain.  -- UA is abnormal with 38 wbc many bacteria and wbc clumps  -- Ucx  has E.coli, resistant to amp / quinolones / bactrim, will change to ceftriaxone.     Bipolar disorder  -psych following, have recommended changes in holding her Latuda and Effexor.      Elevated liver enzymes - drug vs ischemic  Marked elevation of LFTs: tylenol level negative, no obvious drug toxicity.  Question possible ?decreased PO intake over the past 4 month, possible orthostatic hypotension contributing, in addition to persistent effects from her very complicated medication regimen? CK level normal.   - complete abdominal ultrasound with dopplers-> negative  - basic hepatitis studies, autoimmune workup pending  - improving trend LFTs, INR     APOLLO   - creatinine of 4 >> 1.6  - nephrology following, no new recommendations  - anticipate continued renal improvement     Diabetes Mellitus II  -on dulaglutide (injected recently), metformin (hold)  - ssi     # Pain Assessment:  Current Pain Score 7/26/2020   Patient currently in pain? sleeping: patient not able to self report   Pain score (0-10) -   Pain location -   Pain descriptors -   Radha solorzano pain level was assessed and she currently denies pain.       DVT Prophylaxis: On heparin gtt    Code Status: Full Code     Disposition: Expected discharge in 2+ days once treated for likely PE, normotensive, PT/OT evaluation.      Interval History   -- chart reviewed  -- noted nephrology, GI and cardiology notes  -- noted significant improvement of kidney function.    -Data reviewed today: I reviewed all new labs and imaging over the last 24 hours. I personally reviewed no images or  "EKG's today.    Physical Exam   Heart Rate: 72, Blood pressure 135/73, pulse 63, temperature 98.9  F (37.2  C), temperature source Oral, resp. rate 16, height 1.6 m (5' 3\"), weight 101.4 kg (223 lb 9.6 oz), SpO2 92 %.  Vitals:    07/25/20 1732 07/26/20 0600 07/27/20 0700   Weight: 99.7 kg (219 lb 12.8 oz) 102.6 kg (226 lb 1.6 oz) 101.4 kg (223 lb 9.6 oz)     Vital Signs with Ranges  Temp:  [98.4  F (36.9  C)-98.9  F (37.2  C)] 98.9  F (37.2  C)  Pulse:  [63-65] 63  Heart Rate:  [60-78] 72  Resp:  [12-20] 16  BP: (101-135)/(47-73) 135/73  SpO2:  [81 %-99 %] 92 %  I/O's Last 24 hours  I/O last 3 completed shifts:  In: 1279.5 [P.O.:660; I.V.:119.5; IV Piggyback:500]  Out: -     Constitutional: Awake, alert, cooperative, no apparent distress  Respiratory: Clear to auscultation bilaterally, no crackles or wheezing  Cardiovascular: Regular rate and rhythm, normal S1 and S2, and no murmur noted  GI: Normal bowel sounds, soft, non-distended, non-tender  Skin/Integumen: No rashes, no cyanosis, no edema  Other:      Medications   All medications were reviewed.    HEParin 1,650 Units/hr (07/27/20 1318)       busPIRone  30 mg Oral BID     furosemide  20 mg Intravenous Once     methadone  10 mg Oral BID     nicotine  1 patch Transdermal Daily     nicotine   Transdermal Q8H     omeprazole  20 mg Oral Daily     piperacillin-tazobactam  3.375 g Intravenous Q6H     prazosin  10 mg Oral At Bedtime     prazosin  5 mg Oral QAM     QUEtiapine  300 mg Oral At Bedtime     sodium chloride (PF)  10 mL Intracatheter Q8H        Data   Recent Labs   Lab 07/27/20  0406 07/26/20  1420 07/26/20  0427 07/25/20  2218 07/25/20 1941 07/25/20  1215   WBC 10.2 9.9 11.6*  --   --  13.3*   HGB 9.1* 9.6* 10.2*  --   --  10.6*   MCV 95 98 98  --   --  99    289 305  --  321 331   INR 1.21*  --  1.26*  --  1.29*  --      --  137  --   --  135   POTASSIUM 3.2*  --  4.3  --   --  4.5   CHLORIDE 109  --  108  --   --  106   CO2 24  --  19*  --   " --  16*   BUN 28  --  41*  --   --  34*   CR 1.61*  --  4.14*  --   --  3.79*   ANIONGAP 6  --  10  --   --  13   MALACHI 7.8*  --  7.4*  --   --  8.1*   GLC 93  --  88  --   --  140*   ALBUMIN 2.9*  --  2.3*  --   --  2.6*   PROTTOTAL 6.2*  --  5.8*  --   --  6.8   BILITOTAL 0.3  --  0.7  --   --  0.3   ALKPHOS 90  --  102  --   --  127   *  --  987*  --   --  1,064*   *  --  1,332*  --   --  1,950*   TROPI  --   --   --  0.073* 0.071* 0.118*       No results found for this or any previous visit (from the past 24 hour(s)).    Gavino Mix MD  Text Page  (7am to 6pm)

## 2020-07-27 NOTE — PROGRESS NOTES
"[place] - GASTROENTEROLOGY PROGRESS NOTE     IMPRESSION:  1. Increased LFTs - improved today. Source is uncertain, possible drug-induced liver injury, resolving ischemic hepatitis (no inciting event), etc.    RECOMMENDATIONS:  1. Monitor LFTs    Gavin Ya MD  [facility]  Cell [phone]  After 5 PM, please call [phone]  ________________________________________________________________________      SUBJECTIVE:  Patient denies complaints       OBJECTIVE:  /73 (BP Location: Left arm)   Pulse 63   Temp 98.9  F (37.2  C) (Oral)   Resp 16   Ht 1.6 m (5' 3\")   Wt 101.4 kg (223 lb 9.6 oz)   SpO2 92%   BMI 39.61 kg/m    Temp (24hrs), Av.7  F (37.1  C), Min:98.4  F (36.9  C), Max:98.9  F (37.2  C)    Patient Vitals for the past 72 hrs:   Weight   20 0700 101.4 kg (223 lb 9.6 oz)   20 0600 102.6 kg (226 lb 1.6 oz)   20 1732 99.7 kg (219 lb 12.8 oz)   20 1153 93.9 kg (207 lb)        PHYSICAL EXAM  GEN: Alert, NAD.    HRT: reg  LUNGS: CTA  ABD: +BS, non-tender, obese, no rebound or guarding.    Additional Data:  I have reviewed the patient's new clinical lab results:     Recent Labs   Lab Test 20  0406 20  1420 20  04220  1941   WBC 10.2 9.9 11.6*  --    HGB 9.1* 9.6* 10.2*  --    MCV 95 98 98  --     289 305 321   INR 1.21*  --  1.26* 1.29*     Recent Labs   Lab Test 20  0406 20  0427 20  1215   POTASSIUM 3.2* 4.3 4.5   CHLORIDE 109 108 106   CO2 24 19* 16*   BUN 28 41* 34*   ANIONGAP 6 10 13     Recent Labs   Lab Test 20  0406 20  0427 20  1327 20  1215  04/21/15  1610  01/14/15  1910  13  1620   ALBUMIN 2.9* 2.3*  --  2.6*   < >  --    < >  --    < > 4.1   BILITOTAL 0.3 0.7  --  0.3   < >  --    < >  --    < > 0.4   * 987*  --  1,064*   < >  --    < >  --    < > 86*   * 1,332*  --  1,950*   < >  --    < >  --    < > 171*   PROTEIN  --   --  30*  --   --  Negative  --  " Negative   < >  --    LIPASE  --   --   --   --   --   --   --   --   --  82    < > = values in this interval not displayed.

## 2020-07-27 NOTE — PLAN OF CARE
Discharge Planner OT   Patient plan for discharge: Home with spouse  Current status: Patient alert and pleasant.  Mod I for bed mobility and LB dressing seated EOB.  Patient on 4L O2 at rest; reports is not using O2 when up and declined O2 for ambulation.  Patient ambulated into hallway with IV pole and SBA and then reported onset of headache with 2/10 pain reported but appeared uncomfortable.  Patient closed eyes and swayed slightly, activity deferred and returned to room.  O2 had fallen to 83% on RA, /71.  Patient and spouse educated that will need to ambulate with O2.    Barriers to return to prior living situation: O2 needs, headache with ambulation  Recommendations for discharge: Home with assist from spouse  Rationale for recommendations: Continued OT recommended for strengthening and increased independence and endurance with ADLs/mobility; patient does not demonstrate inpatient PT needs as no concerns with mobility, balance and does not have stairs.         Entered by: Katelyn Caro 07/27/2020 2:03 PM

## 2020-07-27 NOTE — PLAN OF CARE
"Summary:     DATE & TIME: 7/26/2020 6974-2811   Cognitive Concerns/ Orientation : A&Ox4   BEHAVIOR & AGGRESSION TOOL COLOR: Green  CIWA SCORE: NA   ABNL VS/O2: Pt on 3L via NC, satting mid 90s. This is not pt's baseline at home. Other VSS. Remains afebrile. GUSMAN, diminished lung sounds. Pt intermittently takes off her oxygen as she says \"it's annoying and I don't want to become addicted\".   MOBILITY: SBA, needs help managing IV pump and portable oxygen for ambulating to BR.   PAIN MANAGMENT: Chronic pain, has hx of arthritis and pt reports \"my joints have been hurting after my tick bite\". Takes scheduled methadone. Has norco and xanax available PRN.   DIET: Regular; fair appetite  BOWEL/BLADDER: Continent, ambulates to BR. Has chronic constipation- pt reports from chronic methadone use.   ABNL LAB/BG: Creat 4.14, Albumin 2.3, , AST 1332, Hgb 9.6, INR 1.26. Last troponin was 0.073.Q4 BG for first 48hrs;  and 168. HepXa at 2030 was <0.10; Heparin modified to 1700 units/hr or 17ml/hr with 4500unit bolus. Next HepXa level ordered for 0410.   DRAIN/DEVICES: 2 PIV; 1 infusing Heparin, other infusing intermittent albumin and anbx.   TELEMETRY RHYTHM  SKIN: NSR  TESTS/PROCEDURES: Cardiology consulted. Unable to obtain CT d/t kidney function; see cardiology note, may do MRI.   D/C DAY/GOALS/PLACE: Pending improvement and consults.   OTHER IMPORTANT INFO: Per report, pt has some sleep apnea and can de-sat. Pt has hx of pelvic floor syndrome and strains to void. BLE numbness and tingling to 5th digit. Daily weight. Nursing will continue to monitor.   "

## 2020-07-27 NOTE — PROGRESS NOTES
Antimicrobial Stewardship Team Note    Antimicrobial Stewardship Program - A joint venture between [facility] and [facility] to optimize antibiotic management.     Patient: Radha Gomez  MRN: [id number]  Allergies: Abilify [aripiprazole]; Paxil [paroxetine]; Contrast dye; Ditropan [oxybutynin chloride]; and Wellbutrin [bupropion]    Brief Summary: 56 yo female with a history of multiple mental health issues on a complex medical regimen, tobacco use, and recent empiric treatment of tick borne illness who presented to the [facility] ER on 7/25 with a 2 day history of dyspnea on exertion.  She was found to have acute kidney kinjury and markedly elevated liver enzymes. We suspect her presentation is related to high NSAID use, polypharmacy, and decrease PO intake over the past 3 months. Was in the ICU, transferred out today 7/26. New RV dysfunction on echo.      Likely PE  Elevated Nt-Pro BNP   - ECHO shows moderate RV dysfunction.  We recommended including pulmonary emboli in differential.  Moderate RV dysfunction and some dilation.  We will start patient on IV heparin for now.  Cannot get CT PE given severe abnormal kidney function at this time.  We will treat for presumed PE currently. Currently comfortable on 3-5L NC. Will consult cardiology given RV dysfunction.   - Was given diuretics this am, became hypotensive.  Was given back 500 cc of normal saline and became normotensive.   Electrolyte abnormalities, liver injury, and APOLLO likely 2/2 to polypharmacy  -slight improvement in LFTs, negative abdominal ultrasound, ECHO pending.  Psych consult to aid with med management. We are concerned that several of her above medications may be exacerbating her current issues (LFTs and renal failure), specifically her lurasidone, sertraline (possible increase in LFTs), and venlafaxine.    -Psychiatry has been consulted. Have recommended changes in holding her Latuda and Effexor       Active  Anti-infective Medications   (From admission, onward)                Start     Stop    07/25/20 1900  piperacillin-tazobactam  2.25 g,   Intravenous,   EVERY 6 HOURS     Sepsis        --          Assessment: Based on urine culture results recommend narrowing to ceftriaxone at this time.      Recommendations:  Medication Change:   -  Change to ceftriaxone.      Pharmacy took the following actions:  .    Discussed with ID Staff Dr. Juan Carlos Mayer, [facility]  Vital Signs/Clinical Features:  Vitals         07/25 0700  -  07/26 0659 07/26 0700  -  07/27 0659 07/27 0700  -  07/27 1254   Most Recent    Temp ( F) 97.8 -  99.5    97.9 -  98.8      98.9     98.9 (37.2)    Pulse 66 -  78    63 -  69       63    Heart Rate 66 -  73    60 -  78      72     72    Resp 11 -  36    10 -  22      16     16    BP 80/50 -  125/89    66/52 -  143/57      135/73     135/73    SpO2 (%) 85 -  100    91 -  99    81 -  96     92            Labs  Estimated Creatinine Clearance: 44.3 mL/min (A) (based on SCr of 1.61 mg/dL (H)).  Recent Labs   Lab Test 04/09/16  1436 08/30/19  1638 08/31/19  0908 07/25/20  1215 07/26/20  0427 07/27/20  0406   CR 0.81 1.12* 0.90 3.79* 4.14* 1.61*       Recent Labs   Lab Test 04/21/15  1445 03/31/16  1225 04/09/16  1436 08/30/19  1638 08/31/19  0908 07/25/20  1215 07/25/20  1941 07/26/20  0427 07/26/20  1420 07/27/20  0406   WBC 7.2 11.3* 9.5 8.2 6.6 13.3*  --  11.6* 9.9 10.2   ANEU 3.8 8.5* 6.2 4.8  --  10.6*  --   --   --  6.5   ALYM 2.6 1.9 2.4 2.2  --  1.9  --   --   --  2.4   ROHITH 0.6 0.6 0.7 1.0  --  0.7  --   --   --  0.6   AEOS 0.2 0.2 0.2 0.3  --  0.0  --   --   --  0.4   HGB 12.7 13.5 13.5 11.0* 10.5* 10.6*  --  10.2* 9.6* 9.1*   HCT 36.7 38.1 38.9 32.8* 31.6* 33.1*  --  31.0* 28.8* 27.1*   MCV 87 86 87 88 89 99  --  98 98 95    297 284 256 253 331 321 305 289 296       Recent Labs   Lab Test 12/22/14  1704 04/21/15  1445 05/26/16  0900 08/30/19  1638 07/25/20  1215 07/26/20  0427  07/27/20  0406   BILITOTAL 0.4 0.2  --  0.2 0.3 0.7 0.3   ALKPHOS 84 83  --  106 127 102 90   ALBUMIN 3.9 3.6  --  3.2* 2.6* 2.3* 2.9*   AST 63* 42  --  16 1,950* 1,332* 432*   ALT 85* 56* 20 24 1,064* 987* 673*       Recent Labs   Lab Test 07/25/20  1313 07/25/20  1534   PCAL 0.16  --    LACT 4.9* 2.7*             Culture Results:  7-Day Micro Results       Procedure Component Value Units Date/Time    Urine Culture Aerobic Bacterial [[id number]]  (Abnormal)  (Susceptibility) Collected:  07/25/20 1328    Order Status:  Completed Lab Status:  Preliminary result Updated:  07/26/20 4476    Specimen:  Midstream Urine      Specimen Description Midstream Urine     Special Requests Specimen received in preservative     Culture Micro >100,000 colonies/mL  Escherichia coli      Susceptibility       Escherichia coli (1)       Antibiotic Interpretation Sensitivity Method Status    AMPICILLIN Resistant >=32 ug/mL MEGGAN Preliminary    CEFAZOLIN Sensitive <=4 ug/mL MEGGAN Preliminary     Cefazolin MEGGAN breakpoints are for the treatment of uncomplicated urinary tract   infections.  For the treatment of systemic infections, please contact the   laboratory for additional testing.    CEFOXITIN Sensitive <=4 ug/mL MEGGAN Preliminary    CEFTAZIDIME Sensitive <=1 ug/mL MEGGAN Preliminary    CEFTRIAXONE Sensitive <=1 ug/mL MEGGAN Preliminary    CIPROFLOXACIN Resistant >=4 ug/mL MEGGAN Preliminary    GENTAMICIN Sensitive <=1 ug/mL MEGGAN Preliminary    LEVOFLOXACIN Resistant >=8 ug/mL MEGGAN Preliminary    NITROFURANTOIN Sensitive <=16 ug/mL MEGGAN Preliminary    TOBRAMYCIN Sensitive <=1 ug/mL MEGGAN Preliminary    Trimethoprim/Sulfa Resistant >=16/304 ug/mL MEGGAN Preliminary    AMPICILLIN/SULBACTAM Resistant >=32 ug/mL MEGGAN Preliminary    Piperacillin/Tazo Sensitive <=4 ug/mL MEGGAN Preliminary    AMIKACIN [*]  Sensitive <=2 ug/mL MEGGAN Preliminary    CEFEPIME Sensitive <=1 ug/mL MEGGAN Preliminary    MEROPENEM [*]  Sensitive <=0.25 ug/mL MEGGAN Preliminary    Ext Spect B Lac Prod [*]   Sensitive Negative  MEGGAN Preliminary               [*]   Suppressed Antibiotic                   Blood culture [[id number]] Collected:  20 1311    Order Status:  Completed Lab Status:  Preliminary result Updated:  20    Specimen:  Blood      Specimen Description Blood Left Arm     Culture Micro No growth after 2 days    Blood culture [[id number]] Collected:  20 1235    Order Status:  Completed Lab Status:  Preliminary result Updated:  20 032    Specimen:  Blood      Specimen Description Blood Left Arm     Culture Micro No growth after 2 days            Recent Labs   Lab Test 14  1401 14  1205 01/14/15  1910 04/21/15  1610 20  1327   URINEPH 5.5 5.5 5.5 5.0 5.5   NITRITE Negative Negative Negative Negative Negative   LEUKEST Small* Negative Negative Negative Large*   WBCU 0  --  2 <1 38*                         Imaging: Nm Lung Scan Perfusion Particulate    Result Date: 2020  NM LUNG SCAN PERFUSION PARTICULATE 2020 4:40 PM HISTORY: PE suspected, high pretest probability. COMPARISON: 2016 TECHNIQUE: Tc-99m MAA injected intravenously for evaluation of pulmonary perfusion. DOSE: 3.6mCi Tc99m MAA @ 1615, L arm IV.  FINDINGS: There are no wedge shaped perfusion defects to suggest pulmonary emboli. No pattern to suggest chronic pulmonary emboli.     IMPRESSION: Negative perfusion scan. SUNITA BONILLA MD    Echocardiogram Complete    Result Date: 2020  [id number]^JESSICA^MANUELA^REYNOLDS    [facility] Echocardiography Laboratory [address] [place] [address]   Name: RONNIE PACHECO MRN: [id number] : 1965 Study Date: 2020 08:00 AM Age: 55 yrs Gender: Female Patient Location: [facility] Reason For Study: Heart Failure Ordering Physician: MANUELA LOPEZ Referring Physician: J Carlos Parsons Performed By: Jourdan Gonzales  BSA: 2.0 m2 Height: 63 in Weight: 226 lb HR: 69 BP: 104/67 mmHg  _____________________________________________________________________________ __   Procedure Complete Portable Echo Adult. Optison (NDC #1987-3994) given intravenously. _____________________________________________________________________________ __   Interpretation Summary  The visual ejection fraction is estimated at 55-60%. No regional wall motion abnormalities noted. The left ventricle is normal in structure, function and size. The right ventricle is moderately dilated. Moderately decreased right ventricular systolic function There is no pericardial effusion.  Given RV appearance, would consider PE in differential _____________________________________________________________________________ __   Left Ventricle The left ventricle is normal in structure, function and size. There is normal left ventricular wall thickness. The visual ejection fraction is estimated at 55-60%. Left ventricular diastolic function is indeterminate. No regional wall motion abnormalities noted.  Right Ventricle The right ventricle is moderately dilated. Moderately decreased right ventricular systolic function.  Atria The left atrium is mildly dilated. Right atrial size is normal.  Mitral Valve There is no mitral regurgitation noted.   Tricuspid Valve There is mild (1+) tricuspid regurgitation.  Aortic Valve No aortic regurgitation is present.  Pulmonic Valve The pulmonic valve is not well visualized.  Vessels The aortic root is normal size. Normal size ascending aorta.  Pericardium There is no pericardial effusion.   Rhythm Sinus rhythm was noted. _____________________________________________________________________________ __ MMode/2D Measurements & Calculations IVSd: 1.1 cm  LVIDd: 4.9 cm LVIDs: 3.7 cm LVPWd: 1.2 cm FS: 25.3 % LV mass(C)d: 210.6 grams LV mass(C)dI: 103.4 grams/m2 Ao root diam: 3.4 cm asc Aorta Diam: 2.8 cm LVOT diam: 2.0 cm LVOT area: 3.2 cm2 LA Volume (BP): 80.7 ml LA Volume Index (BP): 39.6 ml/m2 RWT: 0.47    Doppler  Measurements & Calculations MV E max mat: 102.0 cm/sec MV A max mat: 90.7 cm/sec MV E/A: 1.1 MV dec slope: 377.0 cm/sec2 PA acc time: 0.10 sec TR max mat: 274.2 cm/sec TR max P.1 mmHg Pulm Sys Mat: 48.0 cm/sec Pulm Francis Mat: 45.0 cm/sec Pulm S/D: 1.1 E/E' av.8 Lateral E/e': 10.2 Medial E/e': 15.4    _____________________________________________________________________________ __    Report approved by: OMAR Boone 2020 11:29 AM      Us Lower Extremity Venous Duplex Bilateral    Result Date: 2020  US LOWER EXTREMITY VENOUS DUPLEX BILATERAL 2020 3:19 PM CLINICAL HISTORY/INDICATION: Bilat LE swelling, eval for DVT, elevated D-dimer, hypotension COMPARISON: 2020 TECHNIQUE: Grayscale, color-flow, and spectral waveform analysis were performed of the deep veins of the bilateral lower extremities FINDINGS: The right common femoral vein, femoral vein, popliteal vein and tibial veins demonstrate normal compressibility, spectral waveform, color flow and augmentation. The left common femoral vein, femoral vein, popliteal vein and tibial veins demonstrate normal compressibility, spectral waveform, color flow and augmentation.     IMPRESSION: No deep vein thrombosis in either lower extremity. ELADIA ABRAHAM DO    Us Abdomen Complete Portable    Result Date: 2020  EXAM: US ABDOMEN COMPLETE PORTABLE LOCATION: [facility] DATE/TIME: 2020 6:39 PM INDICATION: Elevated liver function tests, renal disease COMPARISON: CT earlier today TECHNIQUE: Complete abdominal ultrasound. FINDINGS: GALLBLADDER: Surgically removed. BILE DUCTS: No biliary dilatation. The common duct measures 8 mm. LIVER: Dense hepatic steatosis. No focal mass. RIGHT KIDNEY: Normal size. Normal echogenicity with no hydronephrosis or mass. LEFT KIDNEY: Normal size. Normal echogenicity with no hydronephrosis or mass. SPLEEN: Normal. PANCREAS: The visualized portions are normal. AORTA: Normal in caliber.  IVC: Normal where visualized. Trace ascites.     IMPRESSION: 1.  Dense hepatic steatosis. 2.  Common hepatic duct mildly prominent at 8 mm without intrahepatic biliary dilatation.    Ct Chest Abdomen Pelvis W/o Contrast    Result Date: 7/25/2020  CT CHEST, ABDOMEN, AND PELVIS WITHOUT CONTRAST 7/25/2020 3:22 PM HISTORY: Hypoxia. COMPARISON: None. TECHNIQUE: Volumetric helical acquisition of CT images from the lung apices through the symphysis pubis without contrast. Radiation dose for this scan was reduced using automated exposure control, adjustment of the mA and/or kV according to patient size, or iterative reconstruction technique. FINDINGS: Chest: Minimal right and trace left pleural effusion. No pericardial effusion. Moderately extensive interstitial and groundglass infiltrates. A few fissural nodules and subpleural nodules are noted measuring 5 mm or less. Abdomen and pelvis: Small amount of pelvic free fluid which is nonspecific. There are no dilated loops of small intestine or large bowel to suggest ileus or obstruction. Cholecystectomy change. The liver, spleen, adrenal glands, kidneys, and pancreas demonstrate no worrisome focal lesion in the absence of intravenous contrast. There are mild atherosclerotic changes of the visualized aorta and its branches. There is no evidence of aortic aneurysm. No diverticulitis. Survey of the visualized bony structures demonstrates no destructive bony lesions.     IMPRESSION: 1. Moderately extensive interstitial and groundglass infiltrates which are nonspecific. 2. Minimal right and trace left pleural effusion. 3. Small amount of free fluid in the abdomen and pelvis which is nonspecific. SUNITA BONILLA MD    Head Ct W/o Contrast    Result Date: 7/25/2020  CT SCAN OF THE HEAD WITHOUT CONTRAST   7/25/2020 3:04 PM HISTORY: Fall. Pain. TECHNIQUE:  Axial images of the head and coronal reformations without IV contrast material. Radiation dose for this scan was reduced using  automated exposure control, adjustment of the mA and/or kV according to patient size, or iterative reconstruction technique. COMPARISON: 22 December 2014 head CT. FINDINGS: 10 mm calcified lesion arising from the left side of the anterior falx is redemonstrated. Extra-axial. No change since the previous exam and typical appearance for meningioma. Minimal localized mass effect without midline shift or brain edema. Mild, generalized atrophy and mild chronic small vessel vascular change. The visualized portions of the sinuses and mastoids appear normal. The bony calvarium and bones of the skull base appear intact.     IMPRESSION: Negative for acute intracranial process. No fractures. Small, stable left frontal parafalcine meningioma redemonstrated. PORFIRIO GUERRERO MD

## 2020-07-28 ENCOUNTER — APPOINTMENT (OUTPATIENT)
Dept: OCCUPATIONAL THERAPY | Facility: CLINIC | Age: 55
DRG: 314 | End: 2020-07-28
Payer: COMMERCIAL

## 2020-07-28 ENCOUNTER — APPOINTMENT (OUTPATIENT)
Dept: CARDIOLOGY | Facility: CLINIC | Age: 55
DRG: 314 | End: 2020-07-28
Attending: INTERNAL MEDICINE
Payer: COMMERCIAL

## 2020-07-28 LAB
ALBUMIN SERPL-MCNC: 3.1 G/DL (ref 3.4–5)
ALP SERPL-CCNC: 96 U/L (ref 40–150)
ALT SERPL W P-5'-P-CCNC: 452 U/L (ref 0–50)
ANA SER QL IF: NEGATIVE
ANION GAP SERPL CALCULATED.3IONS-SCNC: 4 MMOL/L (ref 3–14)
AST SERPL W P-5'-P-CCNC: 161 U/L (ref 0–45)
BILIRUB SERPL-MCNC: 0.5 MG/DL (ref 0.2–1.3)
BUN SERPL-MCNC: 11 MG/DL (ref 7–30)
CALCIUM SERPL-MCNC: 8.5 MG/DL (ref 8.5–10.1)
CHLORIDE SERPL-SCNC: 104 MMOL/L (ref 94–109)
CO2 SERPL-SCNC: 29 MMOL/L (ref 20–32)
CREAT SERPL-MCNC: 0.84 MG/DL (ref 0.52–1.04)
ERYTHROCYTE [DISTWIDTH] IN BLOOD BY AUTOMATED COUNT: 15 % (ref 10–15)
GFR SERPL CREATININE-BSD FRML MDRD: 77 ML/MIN/{1.73_M2}
GLUCOSE BLDC GLUCOMTR-MCNC: 103 MG/DL (ref 70–99)
GLUCOSE BLDC GLUCOMTR-MCNC: 124 MG/DL (ref 70–99)
GLUCOSE BLDC GLUCOMTR-MCNC: 126 MG/DL (ref 70–99)
GLUCOSE BLDC GLUCOMTR-MCNC: 89 MG/DL (ref 70–99)
GLUCOSE SERPL-MCNC: 200 MG/DL (ref 70–99)
HBA1C MFR BLD: 5.7 % (ref 0–5.6)
HCT VFR BLD AUTO: 32.5 % (ref 35–47)
HGB BLD-MCNC: 10.7 G/DL (ref 11.7–15.7)
INR PPP: 1.09 (ref 0.86–1.14)
LMWH PPP CHRO-ACNC: 0.6 IU/ML
MCH RBC QN AUTO: 31.4 PG (ref 26.5–33)
MCHC RBC AUTO-ENTMCNC: 32.9 G/DL (ref 31.5–36.5)
MCV RBC AUTO: 95 FL (ref 78–100)
PLATELET # BLD AUTO: 368 10E9/L (ref 150–450)
POTASSIUM SERPL-SCNC: 4.2 MMOL/L (ref 3.4–5.3)
PROT SERPL-MCNC: 6.8 G/DL (ref 6.8–8.8)
RBC # BLD AUTO: 3.41 10E12/L (ref 3.8–5.2)
SODIUM SERPL-SCNC: 137 MMOL/L (ref 133–144)
TSH SERPL DL<=0.005 MIU/L-ACNC: 1.67 MU/L (ref 0.4–4)
WBC # BLD AUTO: 10.3 10E9/L (ref 4–11)

## 2020-07-28 PROCEDURE — 36415 COLL VENOUS BLD VENIPUNCTURE: CPT | Performed by: INTERNAL MEDICINE

## 2020-07-28 PROCEDURE — 85610 PROTHROMBIN TIME: CPT | Performed by: INTERNAL MEDICINE

## 2020-07-28 PROCEDURE — 85027 COMPLETE CBC AUTOMATED: CPT | Performed by: INTERNAL MEDICINE

## 2020-07-28 PROCEDURE — 93308 TTE F-UP OR LMTD: CPT | Mod: 26 | Performed by: INTERNAL MEDICINE

## 2020-07-28 PROCEDURE — 80053 COMPREHEN METABOLIC PANEL: CPT | Performed by: INTERNAL MEDICINE

## 2020-07-28 PROCEDURE — 25000132 ZZH RX MED GY IP 250 OP 250 PS 637: Performed by: INTERNAL MEDICINE

## 2020-07-28 PROCEDURE — 25000128 H RX IP 250 OP 636: Performed by: INTERNAL MEDICINE

## 2020-07-28 PROCEDURE — 25000132 ZZH RX MED GY IP 250 OP 250 PS 637: Performed by: HOSPITALIST

## 2020-07-28 PROCEDURE — 84443 ASSAY THYROID STIM HORMONE: CPT | Performed by: INTERNAL MEDICINE

## 2020-07-28 PROCEDURE — 93325 DOPPLER ECHO COLOR FLOW MAPG: CPT | Mod: 26 | Performed by: INTERNAL MEDICINE

## 2020-07-28 PROCEDURE — 93321 DOPPLER ECHO F-UP/LMTD STD: CPT | Mod: 26 | Performed by: INTERNAL MEDICINE

## 2020-07-28 PROCEDURE — 85520 HEPARIN ASSAY: CPT | Performed by: INTERNAL MEDICINE

## 2020-07-28 PROCEDURE — 99231 SBSQ HOSP IP/OBS SF/LOW 25: CPT | Mod: 25 | Performed by: INTERNAL MEDICINE

## 2020-07-28 PROCEDURE — 00000146 ZZHCL STATISTIC GLUCOSE BY METER IP

## 2020-07-28 PROCEDURE — 99233 SBSQ HOSP IP/OBS HIGH 50: CPT | Performed by: HOSPITALIST

## 2020-07-28 PROCEDURE — 83036 HEMOGLOBIN GLYCOSYLATED A1C: CPT | Performed by: INTERNAL MEDICINE

## 2020-07-28 PROCEDURE — 97530 THERAPEUTIC ACTIVITIES: CPT | Mod: GO | Performed by: OCCUPATIONAL THERAPIST

## 2020-07-28 PROCEDURE — 93308 TTE F-UP OR LMTD: CPT

## 2020-07-28 PROCEDURE — 12000000 ZZH R&B MED SURG/OB

## 2020-07-28 PROCEDURE — 25000132 ZZH RX MED GY IP 250 OP 250 PS 637

## 2020-07-28 RX ORDER — VENLAFAXINE HYDROCHLORIDE 75 MG/1
300 CAPSULE, EXTENDED RELEASE ORAL
Status: DISCONTINUED | OUTPATIENT
Start: 2020-07-28 | End: 2020-07-29 | Stop reason: HOSPADM

## 2020-07-28 RX ORDER — DEXTROSE MONOHYDRATE 25 G/50ML
25-50 INJECTION, SOLUTION INTRAVENOUS
Status: DISCONTINUED | OUTPATIENT
Start: 2020-07-28 | End: 2020-07-29 | Stop reason: HOSPADM

## 2020-07-28 RX ORDER — NICOTINE POLACRILEX 4 MG
15-30 LOZENGE BUCCAL
Status: DISCONTINUED | OUTPATIENT
Start: 2020-07-28 | End: 2020-07-29 | Stop reason: HOSPADM

## 2020-07-28 RX ADMIN — BUSPIRONE HYDROCHLORIDE 30 MG: 15 TABLET ORAL at 21:31

## 2020-07-28 RX ADMIN — BUSPIRONE HYDROCHLORIDE 30 MG: 15 TABLET ORAL at 08:31

## 2020-07-28 RX ADMIN — CEFTRIAXONE 1 G: 1 INJECTION, POWDER, FOR SOLUTION INTRAMUSCULAR; INTRAVENOUS at 14:08

## 2020-07-28 RX ADMIN — HYDROCODONE BITARTRATE AND ACETAMINOPHEN 2 TABLET: 5; 325 TABLET ORAL at 12:47

## 2020-07-28 RX ADMIN — VENLAFAXINE HYDROCHLORIDE 300 MG: 75 CAPSULE, EXTENDED RELEASE ORAL at 12:47

## 2020-07-28 RX ADMIN — METHADONE HYDROCHLORIDE 10 MG: 10 TABLET ORAL at 08:31

## 2020-07-28 RX ADMIN — OMEPRAZOLE 20 MG: 20 CAPSULE, DELAYED RELEASE ORAL at 08:31

## 2020-07-28 RX ADMIN — METHADONE HYDROCHLORIDE 10 MG: 10 TABLET ORAL at 21:31

## 2020-07-28 RX ADMIN — PRAZOSIN HYDROCHLORIDE 5 MG: 5 CAPSULE ORAL at 08:31

## 2020-07-28 RX ADMIN — PRAZOSIN HYDROCHLORIDE 10 MG: 5 CAPSULE ORAL at 21:31

## 2020-07-28 RX ADMIN — QUETIAPINE 300 MG: 300 TABLET, FILM COATED ORAL at 21:30

## 2020-07-28 ASSESSMENT — ACTIVITIES OF DAILY LIVING (ADL)
ADLS_ACUITY_SCORE: 13
ADLS_ACUITY_SCORE: 13
ADLS_ACUITY_SCORE: 14
ADLS_ACUITY_SCORE: 16
ADLS_ACUITY_SCORE: 16
ADLS_ACUITY_SCORE: 13

## 2020-07-28 ASSESSMENT — MIFFLIN-ST. JEOR: SCORE: 1580.13

## 2020-07-28 NOTE — PROGRESS NOTES
DATE & TIME: 7/28/20 1524-3537  Cognitive Concerns/ Orientation : A&Ox4   BEHAVIOR & AGGRESSION TOOL COLOR: Green  CIWA SCORE: NA   ABNL VS/O2: VSS on 2L. When pt ambulates or lays flat, O2 86-88% on RA.  MOBILITY: SBA, steady, ambulates in hallway with staff  PAIN MANAGMENT: Denies pain but is on scheduled methadone for chronic pain.   DIET: Regular, good appetite for breakfast  BOWEL/BLADDER: Continent  ABNL LAB/BG: Creat improved to 0.84,  , .   DRAIN/DEVICES: PIV SL  TELEMETRY RHYTHM: NSR  SKIN: Intact  TESTS/PROCEDURES: Echo today  D/C DAY/GOALS/PLACE: Pending  OTHER IMPORTANT INFO: Heparin drip discontinued -- VQ scan was negative for PE, CT unable to be completed due to renal function.  Cardiology following -- ordered another echo today and then follow up outpatient. GI following -- trend LFTs. Psych following.

## 2020-07-28 NOTE — PROGRESS NOTES
Renal Medicine Progress Note                                Radha Gomez MRN# [id number]   Age: 55 year old YOB: 1965   Date of Admission: 7/25/2020 Hospital LOS: 3                  Assessment/Plan:     55-year-old female admitted through the Emergency Room, dated 07/25/2020 in the setting of shortness of breath and dizziness    Evaluated respect to elevated serum creatinine.     1. Apparent near baseline normal renal function on the basis of data review.   -creatinine dated 08/31/2019 in the range of 0.9 mg/dL.   2. Historically bland urinalysis by review.   3. Type 2 diabetes.   4. ARF   -borderline oliguric with creatinine peak 4.14 mg/dL.     -diagnostic US and CT scan do not demonstrate renal abnormality.   -Rafiq sodium was noted to be 35.    -UA demonstrating multiple hyaline casts.   -rapid improvement with NS   6. Lactic acidosis   -normalized  7. Elevated transaminases with a negative Tylenol level.   8. Presentation with hypotension contributing to her dizziness, likely   decreased renal perfusion and possibly decreased liver perfusion      Renal function with rapid improvement  Now baseline    No further recommendations  Call with questions        Interval History:     Up at bedside eating lunch  No complaints    Wants to go home    Note addition of diuretic     IO and UO reviewed    ROS:     GENERAL: NAD, No fever,chills  R: NEGATIVE for significant cough or SOB  CV: NEGATIVE for chest pain, palpitations  EXT: no change edema  ROS otherwise negative    Medications and Allergies:     Reviewed    Physical Exam:     Vitals were reviewed  Patient Vitals for the past 8 hrs:   BP Temp Temp src Pulse Resp SpO2 Weight   07/28/20 1023 -- -- -- -- -- 96 % --   07/28/20 1022 -- -- -- -- -- (!) 87 % --   07/28/20 0931 -- -- -- -- -- 91 % --   07/28/20 0928 -- -- -- -- -- 94 % --   07/28/20 0926 -- -- -- -- -- (!) 86 % --   07/28/20 0726 122/78 98  F (36.7  C) Oral 70 16 93 % --   07/28/20 0614 -- --  -- -- -- -- 102.1 kg (225 lb 1.4 oz)   07/28/20 0500 132/83 98.4  F (36.9  C) Oral 70 16 93 % --     I/O last 3 completed shifts:  In: 100 [P.O.:100]  Out: 2950 [Urine:2950]    Vitals:    07/25/20 1153 07/25/20 1732 07/26/20 0600 07/27/20 0700   Weight: 93.9 kg (207 lb) 99.7 kg (219 lb 12.8 oz) 102.6 kg (226 lb 1.6 oz) 101.4 kg (223 lb 9.6 oz)    07/28/20 0614   Weight: 102.1 kg (225 lb 1.4 oz)         GENERAL: awake, alert, follows  HEENT: NC/AT, PERRLA, EOMI, non icteric, pharynx moist without lesion  RESP:  clear anteriorly  CV: RRR, normal S1 S2  ABDOMEN: soft, nontender, no HSM or masses and bowel sounds normal  MS: no clubbing, cyanosis   SKIN: clear without significant rashes or lesions  NEURO: speech normal and cranial nerves 2-12 intact  PSYCH: affect normal/bright  EXT: warm, no edema    Data:     Recent Labs   Lab 07/28/20  0846 07/27/20  1917 07/27/20  0406 07/26/20  0427 07/25/20  1241 07/25/20  1215     --  139 137  --  135   POTASSIUM 4.2 3.6 3.2* 4.3  --  4.5   CHLORIDE 104  --  109 108  --  106   CO2 29  --  24 19*  --  16*   ANIONGAP 4  --  6 10  --  13   *  --  93 88  --  140*   BUN 11  --  28 41*  --  34*   CR 0.84  --  1.61* 4.14*  --  3.79*   GFRESTIMATED 77  --  35* 11* 11* 13*   GFRESTBLACK 89  --  41* 13* 14* 15*   MALACHI 8.5  --  7.8* 7.4*  --  8.1*         Recent Labs   Lab Test 07/28/20  0846 07/27/20  0406 07/26/20  0427 07/25/20  1215 08/31/19  0908 08/30/19  1638 04/09/16  1436 03/31/16  1225 04/21/15  1445 12/22/14  1704   CR 0.84 1.61* 4.14* 3.79* 0.90 1.12* 0.81 0.78 0.93 0.96     Recent Labs   Lab 07/28/20  0846 07/27/20  0406 07/26/20  0427 07/25/20  1215   ALBUMIN 3.1* 2.9* 2.3* 2.6*     Recent Labs   Lab 07/28/20  0846 07/27/20  0406 07/26/20  1420 07/26/20  0427   HGB 10.7* 9.1* 9.6* 10.2*     Recent Labs   Lab 07/25/20  1327   COLOR Yellow   APPEARANCE Slightly Cloudy   URINEGLC Negative   URINEBILI Small*   URINEKETONE 5*   SG 1.027   UBLD Negative   URINEPH 5.5    PROTEIN 30*   NITRITE Negative   LEUKEST Large*   RBCU 3*   WBCU 38*     Recent Labs   Lab 07/25/20  1327   UNAR 33  35         G Anthony Farah MD    [facility] - Nephrology  [phone]

## 2020-07-28 NOTE — PROGRESS NOTES
[facility]  Hospitalist Progress Note  Bora ALVAREZMireya Cazares, DO  07/28/2020    Assessment & Plan   56 yo female with a history of multiple mental health issues on a complex medical regimen, tobacco use, and recent empiric treatment of tick borne illness who presented to the [facility] ER on 7/25 with a 2 day history of dyspnea on exertion.  She was found to have acute kidney kinjury and markedly elevated liver enzymes. We suspect her presentation is related to high NSAID use, polypharmacy, and decrease PO intake over the past 3 months. Was in the ICU, transferred out 7/26. New RV dysfunction on echo. Her liver and renal failure rapidly resolved.     Acute hypoxemic respiratory failure likely from a stress cardiomyopathy  PE ruled out  Elevated Nt-Pro BNP   Trivial Troponin elevation  Bilateral GGO and interstitial abnormalities thought to be nonspecific  BNP elevated at 27,000  ECHO shows moderate RV dysfunction.  PE was in the differential and was anticoagulated, but the V/Q scan was negative. Definitive CT PE could not be completed because of acute kidney injury. Moderate RV dysfunction and some dilation.  Discussed with radiology and cardiology in terms of PE potential. The chance of PE that is severe enough to cause RV dysfunction AND show no evidence of a perfusion defect on V/Q is minuscule. The V/Q scan was not even low probability as documented elsewhere in the EMR, it was entirely negative. Additionally, PE would not explain the liver and renal failure. At this point, most of the history supports some degree of low perfusion state from vomiting, nsaid induced renal failure with concomitant acute stress cardiomyopathy  -- stop the heparin  -- agree with the diuretics as per cardiology  -- also noted in setting of RBBB to rule out ARVD, if echo fails to improve to consider CMR, as an outpatient, so will need close cardiovascular follow-up.     APOLLO (NSAID + pre-renal)  Hypokalemia secondary to  diuresis   LFTs, improving, negative abdominal ultrasound,  Psych consult to aid with med management.   concerned that several of her above medications may be exacerbating her current issues (LFTs and renal failure), specifically her lurasidone, sertraline (possible increase in LFTs), and venlafaxine so they were held on admission  Given her continued improvement and need for her psychiatric medications, she was wishing to resume these. Her history of liver dysfunction more strongly feels to be related to   Plan  - the patient is very concerned about being off of her antidepressants which were held initially in the setting of liver abnormalities of unknown cause.  - overall liver function is improving, will start the effexor and monitor her LFTs.  - if LFTs continue to improve at her PCP follow-up in 5-7 days would restart the letuda  - again, liver toxicity from medications does not adequately address her numerous other signs of other organ dysfunctions that she presented with       History of Tick Bite  History of tick bite, negative lyme antibody, s/p empiric treatment with doxycycline (partial?)  -continues on ceftriaxone for UTI, can transition to oral on discharge    Hx of interstitial cystitis  Abnormal UA  she has chronic pain as a result of her interstitial cystitis and sees a pain MD  I am not sure if she is able to tell if she has a UTI or not due to chronic pain.  UA is abnormal with 38 wbc many bacteria and wbc clumps  plan  -- complete course of abx     Bipolar disorder  -restarting her psychiatric medications and follow-up on the liver testns       Acute hepatic abnormality most likely ischemia,less likely drug dysfunction  Elevated liver enzymes - drug vs ischemic  Marked elevation of LFTs: tylenol level negative, no obvious drug toxicity. Overall low perfusion state with vomiting most likely leading to liver ischemia  Drug toxicity of course cannot be ruled completely out  completed abdominal  "ultrasound with dopplers-> negative  Hep B, HENNA,, hep a tests are all negative  - LFTs improving  - restart effexor, continue checking LFTs, if LFTs at PCP appointment are still improved, would restart the leytuda as an outpatient     APOLLO   - creatinine of 4 >> 0.8  - nephrology following, no new recommendations  - anticipate continued renal improvement     Diabetes Mellitus II  -on dulaglutide (injected recently), metformin (hold)  - ssi        DVT Prophylaxis: ambulatory    Code Status: Full Code     Disposition: Expected discharge tomorrow      Overall > 1 hour spent on the patient, speaking directly with subspecialists, and navigating her somewhat atypical presentation.      Interval History   -- chart reviewed  -- spoke with GI, radiology, and cardiology about the patient    No chest pain, shortness of breath, nausea, vomiting, or diarrhea. Off of O2 at rest, please see the nursing note    -Data reviewed today: I reviewed all new labs and imaging over the last 24 hours. I personally reviewed no images or EKG's today.    Physical Exam   Heart Rate: 59, Blood pressure 132/83, pulse 70, temperature 98.4  F (36.9  C), temperature source Oral, resp. rate 16, height 1.6 m (5' 3\"), weight 102.1 kg (225 lb 1.4 oz), SpO2 93 %.  Vitals:    07/26/20 0600 07/27/20 0700 07/28/20 0614   Weight: 102.6 kg (226 lb 1.6 oz) 101.4 kg (223 lb 9.6 oz) 102.1 kg (225 lb 1.4 oz)     Vital Signs with Ranges  Temp:  [98.4  F (36.9  C)-98.9  F (37.2  C)] 98.4  F (36.9  C)  Pulse:  [70] 70  Heart Rate:  [59-72] 59  Resp:  [16] 16  BP: (120-136)/(71-83) 132/83  SpO2:  [81 %-97 %] 93 %  I/O's Last 24 hours  I/O last 3 completed shifts:  In: 100 [P.O.:100]  Out: 2950 [Urine:2950]    Constitutional: Awake, alert, cooperative, no apparent distress  Respiratory: Clear to auscultation bilaterally, no crackles or wheezing  Cardiovascular: Regular rate and rhythm, normal S1 and S2, and no murmur noted  GI: Normal bowel sounds, soft, non-distended, " non-tender  Skin/Integumen: No rashes, no cyanosis, no edema  Other:      Medications   All medications were reviewed.    HEParin 1,650 Units/hr (07/27/20 2009)       busPIRone  30 mg Oral BID     cefTRIAXone  1 g Intravenous Q24H     methadone  10 mg Oral BID     nicotine  1 patch Transdermal Daily     nicotine   Transdermal Q8H     omeprazole  20 mg Oral Daily     prazosin  10 mg Oral At Bedtime     prazosin  5 mg Oral QAM     QUEtiapine  300 mg Oral At Bedtime     sodium chloride (PF)  10 mL Intracatheter Q8H        Data   Recent Labs   Lab 07/27/20  1917 07/27/20  0406 07/26/20  1420 07/26/20  0427 07/25/20  2218 07/25/20  1941 07/25/20  1215   WBC  --  10.2 9.9 11.6*  --   --  13.3*   HGB  --  9.1* 9.6* 10.2*  --   --  10.6*   MCV  --  95 98 98  --   --  99   PLT  --  296 289 305  --  321 331   INR  --  1.21*  --  1.26*  --  1.29*  --    NA  --  139  --  137  --   --  135   POTASSIUM 3.6 3.2*  --  4.3  --   --  4.5   CHLORIDE  --  109  --  108  --   --  106   CO2  --  24  --  19*  --   --  16*   BUN  --  28  --  41*  --   --  34*   CR  --  1.61*  --  4.14*  --   --  3.79*   ANIONGAP  --  6  --  10  --   --  13   MALACHI  --  7.8*  --  7.4*  --   --  8.1*   GLC  --  93  --  88  --   --  140*   ALBUMIN  --  2.9*  --  2.3*  --   --  2.6*   PROTTOTAL  --  6.2*  --  5.8*  --   --  6.8   BILITOTAL  --  0.3  --  0.7  --   --  0.3   ALKPHOS  --  90  --  102  --   --  127   ALT  --  673*  --  987*  --   --  1,064*   AST  --  432*  --  1,332*  --   --  1,950*   TROPI  --   --   --   --  0.073* 0.071* 0.118*       No results found for this or any previous visit (from the past 24 hour(s)).    Gavino Mix MD  Text Page  (7am to 6pm)

## 2020-07-28 NOTE — PROGRESS NOTES
[facility] Cardiology Progress Note  Date of Service: 07/28/2020  Primary Cardiologist: Will be Dr. Lopez.    Radha Gomez is a 55 year old female with hx of bipolar disorder on chronic methadone, tobacco use, recent tx for tick-borne illness, HTN and DMII, who was admitted on 7/25/2020 with progressive dyspnea, found to have hypoxic respiratory failure and multiple lab dyscrasias. Cardiology was consulted for abnormal TTE.      Subjective: Marked improvement following IV diuresis yesterday. O2 weaned to off. Wants to go home.     Assessment:  1. Acute hypoxic respiratory failure of unclear etiology, HFpEF contributing.   - Interstitial and groundglass infiltrates noted on CT. proBNP 27,000.    - New RV dilitation / dysfn noted on TTE. D-dimer 18. V/Q scan low probability. US LE's neg for DVT.    - No arrhythmias on tele.   2. APOLLO / ATN / transaminitis - felt from NSAID use. Max 4.1 --> 0.8 today.   3. Slight troponin rise, suspect type II. No chest pain. Had normal cath in 2016 done for chest discomfort along with equivocal stress test.  4. New iRBBB  5. Hypoalbuminemia  6. Allergy to contrast dye    Plan:   1. Start furosemide 20 mg daily tomorrow.   2. Limited TTE today to reassess RV. If still dilated, will plan to obtain cardiac MRI in several weeks, to reassess as well as look for evidence of ARVD in light of incomplete RBBB noted on ECG. She should follow up with us after testing is complete.   3. Advised daily wts and to call clinic with rapid wt gain and/or recurrent dyspnea.   4. Cardiology will sign off.     Jes Miranda PA-C  [facility]  Pager: [phone]  Text Page  (7:30am - 4pm M-F)   __________________________________________________________________________    Physical Exam   Temp: 98  F (36.7  C) Temp src: Oral BP: 122/78 Pulse: 70 Heart Rate: 59 Resp: 16 SpO2: 93 % O2 Device: None (Room air) Oxygen Delivery: 2 LPM  Vitals:    07/26/20 0600 07/27/20  0700 07/28/20 0614   Weight: 102.6 kg (226 lb 1.6 oz) 101.4 kg (223 lb 9.6 oz) 102.1 kg (225 lb 1.4 oz)       GENERAL:  The patient is in no apparent distress.   NECK: CVP adifficult to assess due to body habitus.  PULMONARY:  There is a normal respiratory effort. Soft RLL crackles.   CARDIOVASCULAR:  RRR, normal S1 S2, no m/r/g.  GI:  Non tender abdomen with normoactive bowel sounds and no hepatosplenomegaly. There are no masses palpable.   EXTREMITIES:  No clubbing, cyanosis or edema.  VASCULAR: 2+ Pulses bilaterally in upper and lower extremities.    Medications       busPIRone  30 mg Oral BID     cefTRIAXone  1 g Intravenous Q24H     methadone  10 mg Oral BID     nicotine  1 patch Transdermal Daily     nicotine   Transdermal Q8H     omeprazole  20 mg Oral Daily     prazosin  10 mg Oral At Bedtime     prazosin  5 mg Oral QAM     QUEtiapine  300 mg Oral At Bedtime     sodium chloride (PF)  10 mL Intracatheter Q8H       Data   Most Recent 3 CBC's:  Recent Labs   Lab Test 07/28/20  0846 07/27/20  0406 07/26/20  1420   WBC 10.3 10.2 9.9   HGB 10.7* 9.1* 9.6*   MCV 95 95 98    296 289     Most Recent 3 BMP's:  Recent Labs   Lab Test 07/28/20  0846 07/27/20  1917 07/27/20  0406 07/26/20  0427     --  139 137   POTASSIUM 4.2 3.6 3.2* 4.3   CHLORIDE 104  --  109 108   CO2 PENDING  --  24 19*   BUN PENDING  --  28 41*   CR PENDING  --  1.61* 4.14*   ANIONGAP PENDING  --  6 10   MALACHI PENDING  --  7.8* 7.4*   GLC PENDING  --  93 88     Most Recent 2 LFT's:  Recent Labs   Lab Test 07/28/20  0846 07/27/20  0406   AST PENDING 432*   ALT PENDING 673*   ALKPHOS PENDING 90   BILITOTAL PENDING 0.3     Most Recent 3 Troponin's:  Recent Labs   Lab Test 07/25/20  2218 07/25/20  1941 07/25/20  1215   TROPI 0.073* 0.071* 0.118*     Most Recent 3 BNP's:  Recent Labs   Lab Test 07/25/20  1215   NTBNPI 27,021*     Most Recent D-dimer:  Recent Labs   Lab Test 07/25/20  1215   DD 18.2*     Most Recent Cholesterol Panel:  Recent  Labs   Lab Test 05/26/16  0900   CHOL 105   LDL 37   HDL 38*   TRIG 148     Most Recent TSH and T4:  Recent Labs   Lab Test 03/31/16  1225  06/24/14  1110   TSH 1.22   < > 1.21   T4  --   --  1.45    < > = values in this interval not displayed.

## 2020-07-28 NOTE — PLAN OF CARE
Discharge Planner OT   Patient plan for discharge: Home with spouse; eager for discharge.  Does not believe she will go home on O2.  Current status: Patient met all therapy goals, ambulating 500' without AE without LOB or concern with O2 95% on 2L O2.  No stairs at home, reports has shower chair if needed.  Barriers to return to prior living situation: None  Recommendations for discharge: Home with assist  Rationale for recommendations: No inpatient or home therapy needs indicated    Occupational Therapy Discharge Summary    Reason for therapy discharge:    All goals and outcomes met, no further needs identified.    Progress towards therapy goal(s). See goals on Care Plan in [facility] electronic health record for goal details.  Goals met    Therapy recommendation(s):    No further therapy is recommended.           Entered by: Katelyn Caro 07/28/2020 1:45 PM

## 2020-07-28 NOTE — PLAN OF CARE
DATE & TIME: 7/27/20 1577-6470  Cognitive Concerns/ Orientation : A&Ox4   BEHAVIOR & AGGRESSION TOOL COLOR: Green  CIWA SCORE: NA   ABNL VS/O2:2 liters oxygen with humidification due to dry nose, RA saturations in high 80's  MOBILITY: SBA, ambulating to bathroom   PAIN MANAGMENT: Chronic pain: Hx of arthritis. Methadone scheduled given. Norco given x1 at end of last shift  DIET: Regular, poor appetite   BOWEL/BLADDER: Continent: constipated  ABNL LAB/BG: Creat 1.61, Hemoglobin 9.1, , K 3.6 pt now on replacement protocol. , 121.   DRAIN/DEVICES: 2 PIV: L hand infusing Heparin infusing 1650u/hr (continue rate recheck in Hep 10a AM). PIV in L AC leaked all over, saturated sheets twice- put thrombo patch on- stopped bleeding.  TELEMETRY RHYTHM: NSR  SKIN: Intact  TESTS/PROCEDURES: Cardiology consulted. Unable to obtain CT d/t kidney function; see cardiology note, may do MRI or CT when kidney levels improve.   D/C DAY/GOALS/PLACE: Pending  OTHER IMPORTANT INFO: Pt denies SOB, lungs clear diminished.

## 2020-07-28 NOTE — PROGRESS NOTES
"GASTROENTEROLOGY PROGRESS NOTE     SUBJECTIVE:  No complaints.      OBJECTIVE:  /78 (BP Location: Left arm)   Pulse 70   Temp 98  F (36.7  C) (Oral)   Resp 16   Ht 1.6 m (5' 3\")   Wt 102.1 kg (225 lb 1.4 oz)   SpO2 96%   BMI 39.87 kg/m    Temp (24hrs), Av.4  F (36.9  C), Min:98  F (36.7  C), Max:98.8  F (37.1  C)    Patient Vitals for the past 72 hrs:   Weight   20 0614 102.1 kg (225 lb 1.4 oz)   20 0700 101.4 kg (223 lb 9.6 oz)   20 0600 102.6 kg (226 lb 1.6 oz)   20 1732 99.7 kg (219 lb 12.8 oz)       Intake/Output Summary (Last 24 hours) at 2020 1202  Last data filed at 2020 0834  Gross per 24 hour   Intake 240 ml   Output 1300 ml   Net -1060 ml        PHYSICAL EXAM  Gen: alert, oriented, NAD  Abd: soft, ND  No jaundice            Additional Comments:  ROS, FH, SH: See initial GI consult for details.     I have reviewed the patient's new clinical lab results:     Recent Labs   Lab Test 20  0846 20  0406 20  1420 20  0427   WBC 10.3 10.2 9.9 11.6*   HGB 10.7* 9.1* 9.6* 10.2*   MCV 95 95 98 98    296 289 305   INR 1.09 1.21*  --  1.26*     Recent Labs   Lab Test 20  0846 20  1917 20  0406 20  0427   POTASSIUM 4.2 3.6 3.2* 4.3   CHLORIDE 104  --  109 108   CO2 29  --  24 19*   BUN 11  --  28 41*   ANIONGAP 4  --  6 10     Recent Labs   Lab Test 20  0846 20  0406 20  0427 20  1327  04/21/15  1610  01/14/15  1910  13  1620   ALBUMIN 3.1* 2.9* 2.3*  --    < >  --    < >  --    < > 4.1   BILITOTAL 0.5 0.3 0.7  --    < >  --    < >  --    < > 0.4   * 673* 987*  --    < >  --    < >  --    < > 86*   * 432* 1,332*  --    < >  --    < >  --    < > 171*   PROTEIN  --   --   --  30*  --  Negative  --  Negative   < >  --    LIPASE  --   --   --   --   --   --   --   --   --  82    < > = values in this interval not displayed.        Assessment/Plan: 55 year old female admitted " 7/25 with 2 day history of dyspnea on exertion with findings of acute kidney injury and elevated transaminases.     1. Elevated LFTs. Continuing to trend down/improve today. Etiology unclear - possibly medication induced (Effexor and Latuda on hold) vs resolving ischemic hepatitis.   --Trend LFTs.       CYNTHIA Rock  [facility] ([facility])      Office: [phone]

## 2020-07-28 NOTE — PLAN OF CARE
DATE & TIME: 2300-0730  Cognitive Concerns/ Orientation : A&Ox4   BEHAVIOR & AGGRESSION TOOL COLOR: Green  CIWA SCORE: NA   ABNL VS/O2: VS on 2 liters oxygen with humidification due to dry nose, RA saturations in high 80's  MOBILITY: SBA, ambulating to bathroom   PAIN MANAGMENT: Chronic pain: Hx of arthritis. Methadone scheduled given. Norco given x1 at end of last shift  DIET: Regular, poor appetite   BOWEL/BLADDER: Continent: constipated  ABNL LAB/BG: Creat 1.61, Hemoglobin 9.1, , K 3.6 pt now on protocol. BG 89 and 103. Hep10A recheck this AM   DRAIN/DEVICES: 2 PIV: L hand infusing Heparin infusing 1650u/hr. PIV in L AC leaked all over, saturated sheets twice- put thrombo patch on- stopped bleeding.  TELEMETRY RHYTHM: NSR  SKIN: Intact  TESTS/PROCEDURES: Cardiology consulted. Unable to obtain CT for PE diagnosis d/t kidney function; see cardiology note, may do MRI or CT when kidney levels improve.   D/C DAY/GOALS/PLACE: Pending  OTHER IMPORTANT INFO: Pt denies SOB, lungs clear diminished.       Pt rec'd semi-supine, +GISELLA drain, +IV Pt received semi-supine, +NGT, +a-line, +IV, +4 hemovac drains, +GISELLA drain, +L femoral line

## 2020-07-29 VITALS
HEART RATE: 67 BPM | TEMPERATURE: 98.7 F | WEIGHT: 213.5 LBS | HEIGHT: 63 IN | BODY MASS INDEX: 37.83 KG/M2 | RESPIRATION RATE: 18 BRPM | OXYGEN SATURATION: 90 % | SYSTOLIC BLOOD PRESSURE: 131 MMHG | DIASTOLIC BLOOD PRESSURE: 76 MMHG

## 2020-07-29 LAB
ALBUMIN SERPL-MCNC: 3.1 G/DL (ref 3.4–5)
ALP SERPL-CCNC: 99 U/L (ref 40–150)
ALT SERPL W P-5'-P-CCNC: 315 U/L (ref 0–50)
ANION GAP SERPL CALCULATED.3IONS-SCNC: 5 MMOL/L (ref 3–14)
AST SERPL W P-5'-P-CCNC: 70 U/L (ref 0–45)
BILIRUB SERPL-MCNC: 0.3 MG/DL (ref 0.2–1.3)
BUN SERPL-MCNC: 8 MG/DL (ref 7–30)
CALCIUM SERPL-MCNC: 8.4 MG/DL (ref 8.5–10.1)
CHLORIDE SERPL-SCNC: 108 MMOL/L (ref 94–109)
CO2 SERPL-SCNC: 26 MMOL/L (ref 20–32)
CREAT SERPL-MCNC: 0.67 MG/DL (ref 0.52–1.04)
ERYTHROCYTE [DISTWIDTH] IN BLOOD BY AUTOMATED COUNT: 15.2 % (ref 10–15)
GFR SERPL CREATININE-BSD FRML MDRD: >90 ML/MIN/{1.73_M2}
GLUCOSE BLDC GLUCOMTR-MCNC: 101 MG/DL (ref 70–99)
GLUCOSE BLDC GLUCOMTR-MCNC: 98 MG/DL (ref 70–99)
GLUCOSE SERPL-MCNC: 165 MG/DL (ref 70–99)
HCT VFR BLD AUTO: 33.7 % (ref 35–47)
HGB BLD-MCNC: 11 G/DL (ref 11.7–15.7)
INR PPP: 1.06 (ref 0.86–1.14)
MCH RBC QN AUTO: 31.2 PG (ref 26.5–33)
MCHC RBC AUTO-ENTMCNC: 32.6 G/DL (ref 31.5–36.5)
MCV RBC AUTO: 96 FL (ref 78–100)
PLATELET # BLD AUTO: 387 10E9/L (ref 150–450)
POTASSIUM SERPL-SCNC: 3.2 MMOL/L (ref 3.4–5.3)
PROT SERPL-MCNC: 7 G/DL (ref 6.8–8.8)
RBC # BLD AUTO: 3.53 10E12/L (ref 3.8–5.2)
SODIUM SERPL-SCNC: 139 MMOL/L (ref 133–144)
WBC # BLD AUTO: 9.7 10E9/L (ref 4–11)

## 2020-07-29 PROCEDURE — 80053 COMPREHEN METABOLIC PANEL: CPT | Performed by: INTERNAL MEDICINE

## 2020-07-29 PROCEDURE — 85027 COMPLETE CBC AUTOMATED: CPT | Performed by: INTERNAL MEDICINE

## 2020-07-29 PROCEDURE — 85610 PROTHROMBIN TIME: CPT | Performed by: INTERNAL MEDICINE

## 2020-07-29 PROCEDURE — 36415 COLL VENOUS BLD VENIPUNCTURE: CPT | Performed by: INTERNAL MEDICINE

## 2020-07-29 PROCEDURE — 25000132 ZZH RX MED GY IP 250 OP 250 PS 637

## 2020-07-29 PROCEDURE — 99239 HOSP IP/OBS DSCHRG MGMT >30: CPT | Performed by: HOSPITALIST

## 2020-07-29 PROCEDURE — 25000132 ZZH RX MED GY IP 250 OP 250 PS 637: Performed by: HOSPITALIST

## 2020-07-29 PROCEDURE — 25000132 ZZH RX MED GY IP 250 OP 250 PS 637: Performed by: INTERNAL MEDICINE

## 2020-07-29 PROCEDURE — 00000146 ZZHCL STATISTIC GLUCOSE BY METER IP

## 2020-07-29 RX ORDER — CEPHALEXIN 500 MG/1
500 CAPSULE ORAL 2 TIMES DAILY
Qty: 6 CAPSULE | Refills: 0 | Status: SHIPPED | OUTPATIENT
Start: 2020-07-29 | End: 2020-08-01

## 2020-07-29 RX ORDER — POTASSIUM CHLORIDE 3 G/15ML
20 SOLUTION ORAL DAILY
Qty: 52.5 ML | Refills: 0 | Status: SHIPPED | OUTPATIENT
Start: 2020-07-29 | End: 2020-07-31

## 2020-07-29 RX ORDER — LURASIDONE HYDROCHLORIDE 20 MG/1
20 TABLET, FILM COATED ORAL EVERY EVENING
Qty: 30 TABLET | Refills: 0 | Status: SHIPPED | OUTPATIENT
Start: 2020-07-29 | End: 2020-09-30 | Stop reason: DRUGHIGH

## 2020-07-29 RX ADMIN — OMEPRAZOLE 20 MG: 20 CAPSULE, DELAYED RELEASE ORAL at 08:39

## 2020-07-29 RX ADMIN — METHADONE HYDROCHLORIDE 10 MG: 10 TABLET ORAL at 08:39

## 2020-07-29 RX ADMIN — VENLAFAXINE HYDROCHLORIDE 300 MG: 75 CAPSULE, EXTENDED RELEASE ORAL at 08:39

## 2020-07-29 RX ADMIN — BUSPIRONE HYDROCHLORIDE 30 MG: 15 TABLET ORAL at 08:39

## 2020-07-29 RX ADMIN — POTASSIUM CHLORIDE 40 MEQ: 1500 TABLET, EXTENDED RELEASE ORAL at 10:46

## 2020-07-29 RX ADMIN — PRAZOSIN HYDROCHLORIDE 5 MG: 5 CAPSULE ORAL at 08:39

## 2020-07-29 ASSESSMENT — ACTIVITIES OF DAILY LIVING (ADL)
ADLS_ACUITY_SCORE: 13

## 2020-07-29 ASSESSMENT — MIFFLIN-ST. JEOR: SCORE: 1527.56

## 2020-07-29 NOTE — PROGRESS NOTES
"[facility] - GASTROENTEROLOGY PROGRESS NOTE     IMPRESSION:  1. Elevated AST/ALT - ongoing improvement, pattern of improvement is more consistent with resolving ischemic hepatitis. Ok to restart anti-depressants.    RECOMMENDATIONS:  1. We will sign off, please call with any questions or concerns  2. Ok to restart anti-depressants.    Gavin Ya MD  [facility]  Cell [phone]  After 5 PM, please call [phone]  ________________________________________________________________________      SUBJECTIVE:  Patient feels fine.        OBJECTIVE:  /76 (BP Location: Left arm)   Pulse 67   Temp 98.7  F (37.1  C) (Oral)   Resp 18   Ht 1.6 m (5' 3\")   Wt 96.8 kg (213 lb 8 oz)   SpO2 90%   BMI 37.82 kg/m    Temp (24hrs), Av.4  F (36.9  C), Min:98.2  F (36.8  C), Max:98.7  F (37.1  C)    Patient Vitals for the past 72 hrs:   Weight   20 0714 96.8 kg (213 lb 8 oz)   20 0614 102.1 kg (225 lb 1.4 oz)   20 0700 101.4 kg (223 lb 9.6 oz)        PHYSICAL EXAM  GEN: Alert, NAD.        Additional Data:  I have reviewed the patient's new clinical lab results:     Recent Labs   Lab Test 20  0940 20  0846 20  0406   WBC 9.7 10.3 10.2   HGB 11.0* 10.7* 9.1*   MCV 96 95 95    368 296   INR 1.06 1.09 1.21*     Recent Labs   Lab Test 20  0940 20  0846 20  1917 20  0406   POTASSIUM 3.2* 4.2 3.6 3.2*   CHLORIDE 108 104  --  109   CO2 26 29  --  24   BUN 8 11  --  28   ANIONGAP 5 4  --  6     Recent Labs   Lab Test 20  0940 20  0846 20  0406  20  1327  04/21/15  1610  01/14/15  1910  13  1620   ALBUMIN 3.1* 3.1* 2.9*   < >  --    < >  --    < >  --    < > 4.1   BILITOTAL 0.3 0.5 0.3   < >  --    < >  --    < >  --    < > 0.4   * 452* 673*   < >  --    < >  --    < >  --    < > 86*   AST 70* 161* 432*   < >  --    < >  --    < >  --    < > 171*   PROTEIN  --   --   --   --  30*  --  Negative  --  Negative   < >  --  "   LIPASE  --   --   --   --   --   --   --   --   --   --  82    < > = values in this interval not displayed.

## 2020-07-29 NOTE — PLAN OF CARE
Cognitive Concerns/ Orientation : A/O x 4   BEHAVIOR & AGGRESSION TOOL COLOR: Green  CIWA SCORE: N/A   ABNL VS/O2: VSS, -oxygen saturation, 02 sats 87-89% on RA, placed on 2L NC to keep sats above 90%.  MOBILITY: Independent  PAIN MANAGMENT: Denies pain  DIET: Mod Carb   BOWEL/BLADDER: Continent  ABNL LAB/BG: B  DRAIN/DEVICES: PIV SL  TELEMETRY RHYTHM: NSR  SKIN: Intact, scattered bruising   TESTS/PROCEDURES: Echo done,   D/C DAY/GOALS/PLACE: Pending  OTHER IMPORTANT INFO: Heparin drip discontinued -- VQ scan was negative for PE, CT unable to be completed due to renal function.  Cardiology following -- ordered another echo today and then follow up outpatient. GI following -- trend LFTs. Psych following.

## 2020-07-29 NOTE — PLAN OF CARE
DATE & TIME: 2020 4757-1730  Cognitive Concerns/ Orientation : A&Ox4   ABNL VS/O2: VSS ex O2 desat overnight.  MOBILITY: Independent, steady  PAIN MANAGMENT: Denies  DIET: Mod Carb   BOWEL/BLADDER: Continent  ABNL LAB/BG: B, K+ 3.2 replacement x1 completed, pt refused 2nd dose of 20mEq  DRAIN/DEVICES: PIV removed  TELEMETRY RHYTHM: NSR  SKIN: Intact, bruised  TESTS/PROCEDURES: Echo done,     AVS printed and reviewed with patient. Questions were encouraged and answered. Patient verbalized understanding. Medications explained to patient. IV removed. Pt to discharge home to family. Pt transported off unit in stable condition with all medications and belongings.

## 2020-07-29 NOTE — PROVIDER NOTIFICATION
"MD Notification    Notified Person: MD    Notified Person Name: Debora    Notification Date/Time: 07/29/20 10:53 AM    Notification Interaction: online paging    Purpose of Notification: \"FYI pt labs back, K+ being replaced, pt wondering about discharge\"    Orders Received: orders added    Comments:  "

## 2020-07-29 NOTE — DISCHARGE SUMMARY
[facility]  Hospitalist Discharge Summary      Date of Admission:  7/25/2020  Date of Discharge:  7/29/2020 12:55 PM  Discharging Provider: Bora Cazares,       Discharge Diagnoses   Acute hypoxemic respiratory failure likely secondary to acute right sided systolic congestive heart failure from stress cardiomyopathy  Acute kidney injury from NSAIDs and prerenal azotemia  Ischemic liver injury  E coli urinary tract infection    Follow-ups Needed After Discharge   Follow-up Appointments     Follow-up and recommended labs and tests       Follow up with primary care provider, J Carlos Parsons, within 7 days   to evaluate medication change and for hospital follow- up.  The following   labs/tests are recommended: Liver function tests. Please rapidly resume   previous dosing of latuda 60 mg daily if these continue to improve.      Follow-up with cardiology as outlined             Unresulted Labs Ordered in the Past 30 Days of this Admission     Date and Time Order Name Status Description    7/25/2020 1225 Blood culture Preliminary     7/25/2020 1225 Blood culture Preliminary       These results will be followed up by PCP    Discharge Disposition   Discharged to home  Condition at discharge: Stable      Hospital Course   Acute hypoxemic respiratory failure likely from a stress cardiomyopathy  PE ruled out  Elevated Nt-Pro BNP   Trivial Troponin elevation  Bilateral GGO and interstitial abnormalities thought to be nonspecific  BNP elevated at 27,000  ECHO shows moderate RV dysfunction.  PE was in the differential and was anticoagulated, but the V/Q scan was negative. Definitive CT PE could not be completed because of acute kidney injury. Moderate RV dysfunction and some dilation.  Discussed with radiology and cardiology in terms of PE potential. The chance of PE that is severe enough to cause RV dysfunction AND show no evidence of a perfusion defect on V/Q is minuscule. The V/Q scan was not even  low probability as documented elsewhere in the EMR, it was entirely negative. Additionally, PE would not explain the liver and renal failure. At this point, most of the history supports some degree of low perfusion state from vomiting, nsaid induced renal failure with concomitant acute stress cardiomyopathy  Repeat tte shows improving RV function  Most likely her respiratory failure is from a stress cardiomyopathy  -- continue lasix (potassium rx on discharge  -- follow-up with cardiology for potential mri as an outpatient     APOLLO (NSAID + pre-renal)  Hypokalemia secondary to diuresis   Resolved with fluids  - no ibuprofen or nsaids after discahrge        History of Tick Bite  History of tick bite, negative lyme antibody, s/p empiric treatment with doxycycline (partial?)  -continues on ceftriaxone for UTI, can transition to oral on discharge     Hx of interstitial cystitis  Abnormal UA  she has chronic pain as a result of her interstitial cystitis and sees a pain MD  I am not sure if she is able to tell if she has a UTI or not due to chronic pain.  UA is abnormal with 38 wbc many bacteria and wbc clumps  plan  -- complete course of abx     Bipolar disorder  -restarting her psychiatric medications and follow-up on the liver testns     Acute hepatic abnormality most likely ischemia,less likely drug dysfunction  Elevated liver enzymes - drug vs ischemic  Marked elevation of LFTs: tylenol level negative, no obvious drug toxicity. Overall low perfusion state with vomiting most likely leading to liver ischemia  Drug toxicity of course cannot be ruled completely out  completed abdominal ultrasound with dopplers-> negative  Hep B, HENNA,, hep a tests are all negative  LFTs, improving, negative abdominal ultrasound,  Psych consult to aid with med management.   concerned that several of her above medications may be exacerbating her current issues (LFTs and renal failure), specifically her lurasidone, sertraline (possible  increase in LFTs), and venlafaxine so they were held on admission  Given her continued improvement and need for her psychiatric medications, she was wishing to resume these. Her history of liver dysfunction is much more likely to ischemic liver rather than drug toxicity  Plan  - resumed effexor and lower dose latuda  - if the lfts remain improved at PCP follow-up in 5-7 days, the latuda should be titrated up to her previous dose as appropriate  - LFTs improving  - restart effexor, continue checking LFTs, if LFTs at PCP appointment are still improved, titrate up the latuda as appropriate     APOLLO   - creatinine of 4 >> 0.6  - nephrology following, no new recommendations  - anticipate continued renal improvement     Diabetes Mellitus II  -resume home meds       Consultations This Hospital Stay   PHARMACY TO DOSE VANCO  VASCULAR ACCESS ADULT IP CONSULT  PHYSICAL THERAPY ADULT IP CONSULT  OCCUPATIONAL THERAPY ADULT IP CONSULT  PSYCHIATRY IP CONSULT  NEPHROLOGY IP CONSULT  GASTROENTEROLOGY IP CONSULT  PHARMACY TO DOSE HEPARIN  CARDIOLOGY IP CONSULT    Code Status   Prior    Time Spent on this Encounter   I, Bora Cazares DO, personally saw the patient today and spent greater than 30 minutes discharging this patient.       Bora Cazares DO  [facility]  ______________________________________________________________________    Physical Exam   Vital Signs: Temp: 98.7  F (37.1  C) Temp src: Oral BP: 131/76 Pulse: 67 Heart Rate: 64 Resp: 18 SpO2: 90 % O2 Device: None (Room air) Oxygen Delivery: 2 LPM  Weight: 213 lbs 8 oz  Constitutional: Awake, alert, cooperative, no apparent distress  Respiratory:     Clear to auscultation bilaterally, no crackles or wheezing  Cardiovascular: Regular rate and rhythm, normal S1 and S2, and no murmur noted  GI: Normal bowel sounds, soft, non-distended, non-tender  Skin/Integumen: No rashes, no cyanosis, no edema  Other:          Primary Care Physician   J Carlos LATHAM  Issa    Discharge Orders      N terminal pro BNP outpatient     Comprehensive metabolic panel     Discharge Order: F/U with Cardiac  CALVIN      Reason for your hospital stay    Acute kidney injury from ibuprofen and vomiting  Ischemic liver injury  Most likely stress cardiomyopathy causing acute heart failure, now improving.     Follow-up and recommended labs and tests     Follow up with primary care provider, J Carlos Parsons, within 7 days to evaluate medication change and for hospital follow- up.  The following labs/tests are recommended: Liver function tests. Please rapidly resume previous dosing of latuda 60 mg daily if these continue to improve.      Follow-up with cardiology as outlined     Activity    Your activity upon discharge: activity as tolerated     Discharge Instructions    1. Follow up with your primary doctor for repeat liver tests  2. Please avoid more ibuprofen for the time being. Take tylenol or acetaminophen instead  3. Follow-up with the cardiologist as outlined  4. Follow a low salt diet  5. Discuss with your primary doctor screening for sleep apnea     Diet    Follow this diet upon discharge: Orders Placed This Encounter      Moderate Consistent CHO Diet       Significant Results and Procedures   Most Recent 3 CBC's:  Recent Labs   Lab Test 07/29/20  0940 07/28/20  0846 07/27/20  0406   WBC 9.7 10.3 10.2   HGB 11.0* 10.7* 9.1*   MCV 96 95 95    368 296     Most Recent 3 BMP's:  Recent Labs   Lab Test 07/29/20  0940 07/28/20  0846 07/27/20  1917 07/27/20  0406    137  --  139   POTASSIUM 3.2* 4.2 3.6 3.2*   CHLORIDE 108 104  --  109   CO2 26 29  --  24   BUN 8 11  --  28   CR 0.67 0.84  --  1.61*   ANIONGAP 5 4  --  6   MALACHI 8.4* 8.5  --  7.8*   * 200*  --  93     Most Recent 2 LFT's:  Recent Labs   Lab Test 07/29/20  0940 07/28/20  0846   AST 70* 161*   * 452*   ALKPHOS 99 96   BILITOTAL 0.3 0.5     Most Recent 3 INR's:  Recent Labs   Lab Test 07/29/20 0940  07/28/20  0846 07/27/20  0406   INR 1.06 1.09 1.21*   ,   Results for orders placed or performed during the hospital encounter of 07/25/20   Head CT w/o contrast    Narrative    CT SCAN OF THE HEAD WITHOUT CONTRAST   7/25/2020 3:04 PM     HISTORY: Fall. Pain.    TECHNIQUE:  Axial images of the head and coronal reformations without  IV contrast material. Radiation dose for this scan was reduced using  automated exposure control, adjustment of the mA and/or kV according  to patient size, or iterative reconstruction technique.    COMPARISON: 22 December 2014 head CT.    FINDINGS: 10 mm calcified lesion arising from the left side of the  anterior falx is redemonstrated. Extra-axial. No change since the  previous exam and typical appearance for meningioma. Minimal localized  mass effect without midline shift or brain edema. Mild, generalized  atrophy and mild chronic small vessel vascular change. The visualized  portions of the sinuses and mastoids appear normal. The bony calvarium  and bones of the skull base appear intact.       Impression    IMPRESSION: Negative for acute intracranial process. No fractures.  Small, stable left frontal parafalcine meningioma redemonstrated.    PORFIRIO GUERRERO MD   US Lower Extremity Venous Duplex Bilateral    Narrative    US LOWER EXTREMITY VENOUS DUPLEX BILATERAL 7/25/2020 3:19 PM    CLINICAL HISTORY/INDICATION: Bilat LE swelling, eval for DVT, elevated  D-dimer, hypotension    COMPARISON: 4/19/2020    TECHNIQUE:   Grayscale, color-flow, and spectral waveform analysis were performed  of the deep veins of the bilateral lower extremities    FINDINGS:   The right common femoral vein, femoral vein, popliteal vein and tibial  veins demonstrate normal compressibility, spectral waveform, color  flow and augmentation.    The left common femoral vein, femoral vein, popliteal vein and tibial  veins demonstrate normal compressibility, spectral waveform, color  flow and augmentation.       Impression    IMPRESSION:   No deep vein thrombosis in either lower extremity.    ELADIA ABRAHAM, DO   CT Chest Abdomen Pelvis w/o Contrast    Narrative    CT CHEST, ABDOMEN, AND PELVIS WITHOUT CONTRAST 7/25/2020 3:22 PM     HISTORY: Hypoxia.    COMPARISON: None.    TECHNIQUE: Volumetric helical acquisition of CT images from the lung  apices through the symphysis pubis without contrast. Radiation dose  for this scan was reduced using automated exposure control, adjustment  of the mA and/or kV according to patient size, or iterative  reconstruction technique.    FINDINGS:     Chest: Minimal right and trace left pleural effusion. No pericardial  effusion. Moderately extensive interstitial and groundglass  infiltrates. A few fissural nodules and subpleural nodules are noted  measuring 5 mm or less.    Abdomen and pelvis: Small amount of pelvic free fluid which is  nonspecific. There are no dilated loops of small intestine or large  bowel to suggest ileus or obstruction. Cholecystectomy change. The  liver, spleen, adrenal glands, kidneys, and pancreas demonstrate no  worrisome focal lesion in the absence of intravenous contrast. There  are mild atherosclerotic changes of the visualized aorta and its  branches. There is no evidence of aortic aneurysm. No diverticulitis.    Survey of the visualized bony structures demonstrates no destructive  bony lesions.      Impression    IMPRESSION:  1. Moderately extensive interstitial and groundglass infiltrates which  are nonspecific.  2. Minimal right and trace left pleural effusion.  3. Small amount of free fluid in the abdomen and pelvis which is  nonspecific.    SUNITA BONILLA MD   NM Lung Scan Perfusion Particulate    Narrative    NM LUNG SCAN PERFUSION PARTICULATE 7/25/2020 4:40 PM    HISTORY: PE suspected, high pretest probability.    COMPARISON: March 31, 2016    TECHNIQUE: Tc-99m MAA injected intravenously for evaluation of  pulmonary perfusion.    DOSE: 3.6mCi Tc99m MAA  @ 1615, L arm IV.      FINDINGS: There are no wedge shaped perfusion defects to suggest  pulmonary emboli. No pattern to suggest chronic pulmonary emboli.      Impression    IMPRESSION: Negative perfusion scan.     SUNITA BONILLA MD   US Abdomen Complete Portable    Narrative    EXAM: US ABDOMEN COMPLETE PORTABLE  LOCATION: [facility]  DATE/TIME: 2020 6:39 PM    INDICATION: Elevated liver function tests, renal disease  COMPARISON: CT earlier today  TECHNIQUE: Complete abdominal ultrasound.    FINDINGS:    GALLBLADDER: Surgically removed.    BILE DUCTS: No biliary dilatation. The common duct measures 8 mm.    LIVER: Dense hepatic steatosis. No focal mass.    RIGHT KIDNEY: Normal size. Normal echogenicity with no hydronephrosis or mass.     LEFT KIDNEY: Normal size. Normal echogenicity with no hydronephrosis or mass.     SPLEEN: Normal.    PANCREAS: The visualized portions are normal.    AORTA: Normal in caliber.     IVC: Normal where visualized.    Trace ascites.      Impression    IMPRESSION:  1.  Dense hepatic steatosis.  2.  Common hepatic duct mildly prominent at 8 mm without intrahepatic biliary dilatation.   Echocardiogram Complete    Narrative    [id number]  [facility]  [id number]  [id number]^JESSICA^MANUELA^REYNOLDS           [facility]  Echocardiography Laboratory  [address] [place] [address]        Name: RONNIE PACHECO  MRN: [id number]  : 1965  Study Date: 2020 08:00 AM  Age: 55 yrs  Gender: Female  Patient Location: [facility]  Reason For Study: Heart Failure  Ordering Physician: MANUELA LOPEZ  Referring Physician: J Carlos Parsons  Performed By: Jourdan Gonzales     BSA: 2.0 m2  Height: 63 in  Weight: 226 lb  HR: 69  BP: 104/67 mmHg  _____________________________________________________________________________  __        Procedure  Complete Portable Echo Adult. Optison (NDC #[id number]) given  intravenously.  _____________________________________________________________________________  __        Interpretation Summary     The visual ejection fraction is estimated at 55-60%.  No regional wall motion abnormalities noted.  The left ventricle is normal in structure, function and size.  The right ventricle is moderately dilated.  Moderately decreased right ventricular systolic function  There is no pericardial effusion.     Given RV appearance, would consider PE in differential  _____________________________________________________________________________  __        Left Ventricle  The left ventricle is normal in structure, function and size. There is normal  left ventricular wall thickness. The visual ejection fraction is estimated at  55-60%. Left ventricular diastolic function is indeterminate. No regional wall  motion abnormalities noted.     Right Ventricle  The right ventricle is moderately dilated. Moderately decreased right  ventricular systolic function.     Atria  The left atrium is mildly dilated. Right atrial size is normal.     Mitral Valve  There is no mitral regurgitation noted.        Tricuspid Valve  There is mild (1+) tricuspid regurgitation.     Aortic Valve  No aortic regurgitation is present.     Pulmonic Valve  The pulmonic valve is not well visualized.     Vessels  The aortic root is normal size. Normal size ascending aorta.     Pericardium  There is no pericardial effusion.        Rhythm  Sinus rhythm was noted.  _____________________________________________________________________________  __  MMode/2D Measurements & Calculations  IVSd: 1.1 cm     LVIDd: 4.9 cm  LVIDs: 3.7 cm  LVPWd: 1.2 cm  FS: 25.3 %  LV mass(C)d: 210.6 grams  LV mass(C)dI: 103.4 grams/m2  Ao root diam: 3.4 cm  asc Aorta Diam: 2.8 cm  LVOT diam: 2.0 cm  LVOT area: 3.2 cm2  LA Volume (BP): 80.7 ml  LA Volume Index (BP): 39.6 ml/m2  RWT: 0.47           Doppler Measurements & Calculations  MV E max venkata: 102.0  cm/sec  MV A max mat: 90.7 cm/sec  MV E/A: 1.1  MV dec slope: 377.0 cm/sec2  PA acc time: 0.10 sec  TR max mat: 274.2 cm/sec  TR max P.1 mmHg  Pulm Sys Mat: 48.0 cm/sec  Pulm Francis Mat: 45.0 cm/sec  Pulm S/D: 1.1  E/E' av.8  Lateral E/e': 10.2  Medial E/e': 15.4           _____________________________________________________________________________  __           Report approved by: OMAR Boone 2020 11:29 AM      Echocardiogram Limited    Narrative    [id number]  [id number]  [id number]  [id number]^STEFANIA^SENDY^[facility]  Echocardiography Laboratory  [address] [place] [address]        Name: RONNIE PACHECO  MRN: [id number]  : 1965  Study Date: 2020 02:22 PM  Age: 55 yrs  Gender: Female  Patient Location: [facility]  Reason For Study: SOB  Ordering Physician: SENDY AGUIAR  Performed By: Eve Lei     BSA: 2.0 m2  Height: 63 in  Weight: 225 lb  HR: 63  BP: 122/78 mmHg  _____________________________________________________________________________  __        Procedure  Limited Portable Echo Adult.  _____________________________________________________________________________  __        Interpretation Summary     Right ventricle is mild to moderately dilated. Right ventricular systolic  function is mildly reduced. Relative apical hypokinesis.     IVC diameter and respiratory changes fall into an intermediate range  suggesting an RA pressure of 8 mmHg.     This study was compared to a prior TTE from 2020. RV size is mildly  smaller, and the severity of RV dysfunction has improved. However recommend  contrast imaging on future studies for reassessment, as the RV was better  appreciated with the use of contrast imaging on the prior study, and was not  utilized on this present study.  _____________________________________________________________________________  __        Left Ventricle  The left ventricle is normal in size. There is normal left  ventricular wall  thickness. Left ventricular systolic function is normal. The visual ejection  fraction is estimated at 60-65%. No regional wall motion abnormalities noted.     Right Ventricle  The right ventricle is mild to moderately dilated. The right ventricular  systolic function is mildly reduced. Relative apical hypokinesis. A moderator  band is seen in the right ventricle.     Mitral Valve  The mitral valve is normal in structure and function.     Tricuspid Valve  The tricuspid valve is not well visualized, but is grossly normal. There is  trace tricuspid regurgitation. Right ventricular systolic pressure could not  be approximated due to inadequate tricuspid regurgitation.        Aortic Valve  The aortic valve is normal in structure and function.     Pulmonic Valve  The pulmonic valve is not well seen, but is grossly normal.     Vessels  The aortic root is normal size. Dilation of the inferior vena cava is present  with normal respiratory variation in diameter. IVC diameter and respiratory  changes fall into an intermediate range suggesting an RA pressure of 8 mmHg.     Pericardium  There is no pericardial effusion.     _____________________________________________________________________________  __        Doppler Measurements & Calculations  MV E max venkata: 114.0 cm/sec  MV A max venkata: 94.1 cm/sec  MV E/A: 1.2  MV dec time: 0.24 sec  E/E' av.6     Lateral E/e': 10.4  Medial E/e': 16.9           _____________________________________________________________________________  __           Report approved by: Jerry Gee 2020 03:37 PM            Discharge Medications   Discharge Medication List as of 2020 11:31 AM      START taking these medications    Details   cephALEXin (KEFLEX) 500 MG capsule Take 1 capsule (500 mg) by mouth 2 times daily for 3 days, Disp-6 capsule,R-0, E-Prescribe      Potassium Chloride 40 MEQ/15ML (20%) SOLN Take 7.5 mLs (20 mEq) by mouth daily for 7 days, Disp-52.5  mL,R-0, E-Prescribe         CONTINUE these medications which have CHANGED    Details   lurasidone (LATUDA) 20 MG TABS tablet Take 1 tablet (20 mg) by mouth every evening, Disp-30 tablet,R-0, E-Prescribe         CONTINUE these medications which have NOT CHANGED    Details   ALPRAZolam (XANAX) 0.25 MG tablet Take 1.5 mg by mouth daily as needed for anxiety 0.25 mg x 6 tablets, Historical      aspirin 81 MG EC tablet Take 1 tablet (81 mg) by mouth daily, Disp-90 tablet, R-3, E-Prescribe      busPIRone HCl (BUSPAR) 30 MG tablet Take by mouth 2 times daily , Historical      dulaglutide (TRULICITY) 0.75 MG/0.5ML pen Inject 0.75 mg Subcutaneous every 7 days, Historical      !! glycopyrrolate (GLYCATE) 2 MG tablet Take 2 mg by mouth every evening, Historical      !! glycopyrrolate (GLYCATE) 2 MG tablet Take 4 mg by mouth every morning 2 x 2mg, Historical      HYDROcodone-acetaminophen (NORCO) 5-325 MG per tablet Take 2 tablets by mouth 3 times daily as needed for moderate to severe pain , Historical      metFORMIN (GLUCOPHAGE) 1000 MG tablet Take 1,000 mg by mouth 2 times daily (with meals), Historical      methadone (DOLOPHINE) 10 MG tablet Take 10 mg by mouth 2 times daily, Historical      omeprazole (PRILOSEC) 20 MG DR capsule Take 20 mg by mouth daily OR ZANTAC, Historical      phenazopyridine (PYRIDIUM) 200 MG tablet Take 200 mg by mouth 3 times daily as needed for irritation, Historical      !! prazosin (MINIPRESS) 5 MG capsule Take 5 mg by mouth every morning, Historical      !! prazosin (MINIPRESS) 5 MG capsule Take 10 mg by mouth At Bedtime, Historical      propranolol ER (INDERAL LA) 80 MG 24 hr capsule Take 80 mg by mouth daily, Historical      QUEtiapine (SEROQUEL) 300 MG tablet Take 300 mg by mouth every evening, Historical      ranitidine (ZANTAC) 150 MG tablet Take 150 mg by mouth 2 times daily OR OMEPRAZOLE, Historical      venlafaxine (EFFEXOR-XR) 150 MG 24 hr capsule Take 300 mg by mouth daily, Historical        !! - Potential duplicate medications found. Please discuss with provider.      STOP taking these medications       atorvastatin (LIPITOR) 40 MG tablet Comments:   Reason for Stopping:         ibuprofen (ADVIL/MOTRIN) 800 MG tablet Comments:   Reason for Stopping:         ibuprofen (ADVIL/MOTRIN) 800 MG tablet Comments:   Reason for Stopping:             Allergies   Allergies   Allergen Reactions     Abilify [Aripiprazole] Cramps     Total body- couldn't walk     Paxil [Paroxetine] Other (See Comments)     Total body pain     Contrast Dye Swelling     Ditropan [Oxybutynin Chloride]      Can't Urinate, Per Pt.     Wellbutrin [Bupropion] Palpitations     If not XL

## 2020-07-30 ENCOUNTER — TELEPHONE (OUTPATIENT)
Dept: CARDIOLOGY | Facility: CLINIC | Age: 55
End: 2020-07-30

## 2020-07-30 DIAGNOSIS — R93.1 ABNORMAL ECHOCARDIOGRAM: ICD-10-CM

## 2020-07-30 DIAGNOSIS — I50.30 HEART FAILURE WITH PRESERVED EJECTION FRACTION, NYHA CLASS I (H): Primary | ICD-10-CM

## 2020-07-30 NOTE — TELEPHONE ENCOUNTER
Yes, she should start lasix 20 MG daily. Can we arrange a virtual visit with Jes or myself in 2 weeks Thanks. Can she give her a prescription for the pill form of KCL?

## 2020-07-30 NOTE — TELEPHONE ENCOUNTER
"Patient was evaluated by cardiology while inpatient for increased SOB with many lab dyscrasias, troponin elevation demand ischemia, HF with abnormal JUAN showing EF 60%, but with new RBBB and RV dilation and dysfunction of unknown etiology. PMH: Bipolar disorder on chronic methadone, tobacco use, recent tx for tick-borne illness, HTN and DMII.  US negative for DVT. Per IP cardiology plan of care: \"will plan to obtain cardiac MRI in several weeks, to reassess as well as look for evidence of ARVD in light of incomplete RBBB noted on ECG. She should follow up with us after testing is complete.\" Called patient to discuss any post hospital d/c questions, review discharge medication, and confirm f/u appts. Patient denied any questions regarding new or changes to PTA medications. Pt states she was told by CALVIN Jes Miranda during IP stay that she was going to be started on a water pill, \"but it wasn't Lasix\" but none was prescribed at time of discharge. See Jes Miranda' IP note below. Writer will route this note to Jes Miranda to clarify. Pt also states she is having difficulty swallowing liquid KCL provided by IP pharmacy at discharge. \"They said that they were out of the pill form. The liquid tastes horrible and I can't take it.\" Encouraged pt to call her PMD for new RX. Patient denied any increased SOB, any chest pain, edema, orthopnea, fever or light headedness. States she continues to become easily \"winded\" with activity, but has not worsened. RN confirmed with patient that she needs to schedule for follow labs, and that she is scheduled for a cardiology CALV[facility] video visit with Jes Miranda on 8/12/20 at 0810. Instructed patient to weigh self every AM, after waking and using the restroom, but before breakfast and medications. Call clinic for a weight gain of 2 lbs overnight or 5 lbs in a week. Low Na+ diet encouraged. Pt instructed to have daily wt/BP diary and medications readily available for video visit appt. Patient " advised to call clinic with any cardiac related questions or concerns prior to his appt. Patient verbalized understanding and agreed with plan. EVA Dupont RN.         Plan:   1. Start furosemide 20 mg daily tomorrow.   2. Limited TTE today to reassess RV. If still dilated, will plan to obtain cardiac MRI in several weeks, to reassess as well as look for evidence of ARVD in light of incomplete RBBB noted on ECG. She should follow up with us after testing is complete.   3. Advised daily wts and to call clinic with rapid wt gain and/or recurrent dyspnea.   4. Cardiology will sign off.      Jes Miranda PA-C

## 2020-07-31 LAB
BACTERIA SPEC CULT: NO GROWTH
BACTERIA SPEC CULT: NO GROWTH
SPECIMEN SOURCE: NORMAL
SPECIMEN SOURCE: NORMAL

## 2020-07-31 RX ORDER — FUROSEMIDE 20 MG
20 TABLET ORAL DAILY
Qty: 90 TABLET | Refills: 3 | Status: SHIPPED | OUTPATIENT
Start: 2020-07-31 | End: 2020-09-09

## 2020-07-31 RX ORDER — POTASSIUM CHLORIDE 1500 MG/1
20 TABLET, EXTENDED RELEASE ORAL DAILY
Qty: 90 TABLET | Refills: 3 | Status: SHIPPED | OUTPATIENT
Start: 2020-07-31 | End: 2020-08-12

## 2020-07-31 NOTE — TELEPHONE ENCOUNTER
Called and spoke with pt.  Discussed Dr. Aguilar's recommendation to take lasix 20mg BID for the next three days and then resume lasix 20mg daily on Monday. Advised pt to take all 40mg of lasix when she picks up the Rx so that it has time to work before going to bed tonight, then can take lasix 20mg BID starting tomorrow- she agrees with this plan. Also recommended pt eat foods that are higher in potassium while taking higher lasix dose for three days- she agrees with this plan. Will plan to call pt on 8/3 for wt/sx update.     CHRIS Fox 1:53 PM 7/31/2020

## 2020-07-31 NOTE — TELEPHONE ENCOUNTER
Called and spoke with pt.  Reviewed that Dr. Aguilar does recommend she take lasix 20mg daily- she verbalized understanding and requested that Rx be sent to [facility] in [place] on [address]. Rx sent as requested.  Also sent Rx for KCL tabs instead of liquid, as Dr. Aguilar recommended. Pt reports she gained 3.5# home scale overnight. She denies any increased SOB so far today; however, she does report feeling dizzy with exertion and has abdominal bloating.      Discussed with pt that will review wt/sx with Dr. Aguilar and callback with recommendations- she agrees with this plan.    CHRIS Fox 12:25 PM 7/31/2020

## 2020-08-03 NOTE — TELEPHONE ENCOUNTER
Writer called pt back, as she had left two VM's last Friday, and writer wanting to make sure she had all of her questions answered by Shirley White RN as below. Pt denies SOB, orthopnea, increased edema. States her weight on 7/31/20 was 213.8 and today is 209 after taking Lasix as below. States she has also been taking KCL. Reminded pt to only check weight once every AM as instructed below and continue to keep daily wt diary. Reviewed to call us if weight goes up 2 lbs overnight or 5 lbs in a week. Pt verbalized understanding of all instructions and appreciative of follow up. Writer provided pt with Shirley's phone number and encouraged her to call her first and if not response if adequate time frame, to call me and leave a VM. Verbalized understanding. EVA Dupont RN.

## 2020-08-03 NOTE — TELEPHONE ENCOUNTER
"Called and spoke with pt.  She confirms she took 40mg lasix on 7/31, 8/1 and 8/2 and is now starting 20mg lasix today, 8/3.  She reports her wt did come down from 213.8# and is 209# today. Advised pt to call if she notices any wt loss or decrease >2# overnight or 5# in a week. Reminded pt of video visit with ROD Couch on 8/12/20- she agrees with this plan.    Pt states she was told in the hospital that cMRI will be done in the near future and she is wondering if it should be done prior to visit with ROD Couch.      Hospital note from ROD Couch on 7/28/20 states:  \"Limited TTE today to reassess RV. If still dilated, will plan to obtain cardiac MRI in several weeks, to reassess as well as look for evidence of ARVD in light of incomplete RBBB noted on ECG. She should follow up with us after testing is complete.\"    Discussed with pt that will check with ROD Couch and callback with recommendations    CHRIS Fox 1:15 PM 8/3/2020    "

## 2020-08-04 NOTE — TELEPHONE ENCOUNTER
Yes, I do - will place orders now. Will also need premedication for contrast allergy - please find out where she would like meds sent. Thanks!

## 2020-08-05 ENCOUNTER — DOCUMENTATION ONLY (OUTPATIENT)
Dept: CARDIOLOGY | Facility: CLINIC | Age: 55
End: 2020-08-05

## 2020-08-05 RX ORDER — DIPHENHYDRAMINE HCL 25 MG
TABLET ORAL
Qty: 3 TABLET | Refills: 0 | Status: SHIPPED | OUTPATIENT
Start: 2020-08-05 | End: 2020-08-12

## 2020-08-05 RX ORDER — PREDNISONE 20 MG/1
TABLET ORAL
Qty: 6 TABLET | Refills: 0 | Status: SHIPPED | OUTPATIENT
Start: 2020-08-05 | End: 2020-08-12

## 2020-08-05 NOTE — PROGRESS NOTES
Wellness Screening Tool    Symptom Screening:    Do you have one of the following NEW symptoms:      Fever (subjective or >100.0)?  No    New cough? No    Shortness of breath? No    Chills? No    New loss of taste or smell? No    Generalized body aches? No    New persistent headache? No    New sore throat? No    Nausea, vomiting or diarrhea? Yes - pt's PCP is aware and believes that she may have reaction to antibiotics she is currently taking.     Within the past 3 weeks, have you been exposed to someone with a known positive illness below?      COVID - 19 (known or suspected) No    Chicken pox?  No    Measles? No    Pertussis? No          Patient notified of visitor restriction: Yes- also informed pt that she will need a  if she thinks she may need sedation for MRI.   Patient informed to wear a mask: Yes    Patient's appointment status: Patient will be seen in clinic as scheduled on 8/11/20 at 8:00am (7:30am check-in) for cMRI.    CHRIS Fox 3:40 PM 8/5/2020

## 2020-08-05 NOTE — TELEPHONE ENCOUNTER
Called and spoke with pt. Reviewed that ROD Couch recommends cMRI be done prior to visit on 8/12/20. Pt verbalized understanding and agrees to cMRI on 8/11/20 at 8:00am (7:30am check in). Reviewed prep and pre-contrast medication instructions and need for  with sedation with pt- also requested scheduling to mail her a copy of this information. Pt verbalized understanding and agrees to  Prednison and Benadryl Rx at [facility] in [place] and take as prescribed prior to cMRI on 8/11/20.     Also discussed need for CMP and BNP prior to 8/12/20 visit. Pt reports she just had labs done at her [facility] clinic yesterday. Per review of Care Everywhere, pt had CMP done 8/4/20.              Discussed with pt that will call [facility] to see if BNP lab can be added on to blood drawn yesterday.    Spoke with [facility] lab who confirms that they cannot add on BNP to blood drawn yesterday because the color tube that is needed for BNP test was not drawn.     Will route to ROD Couch to see if she want pt to be scheduled for another lab appt prior to video visit on 8/12.    CHRIS Fox 3:55 PM 8/5/2020

## 2020-08-06 NOTE — TELEPHONE ENCOUNTER
Subjective    REASONS FOR FOLLOW-UP :  1.  Rectal cancer T3N1M0 treated with neoadjuvant chemoradiation  2. ypT3N0 at surgery 5/17 with a positive radial margin reexcised and treated with radiation boost to the right pelvic sidewall postoperatively  3.  Adjuvant FOLFOX 6 x 8 completed in 10/16  4.  Additional surgery in 11/16 with colostomy reversal  5.  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  Unexplained small bilateral pleural effusions first noted in 5/17 not seen in 11/16 CAT scan of the chest resolved in 10/17-recurred in 4/18    History of Present Illness    Mr. Viera is a 51year-old man returning today for follow-up for rectal cancer.  Genetic testing wis consistent with Mathias syndrome with a PMS 2 mutation which is associated with lower risk of subsequent colon cancer then the MLH1 and 2 mutations    .  Continues on aspirin 650 daily for polyp prevention    He is here to review his CAT scans and small bilateral pleural effusions have persisted now for 3 years with no and I have no good cause for this.  Stability is reassuring-pancreas appears normal.  CEA is 1.47    His colonoscopy and EGD are overdue because of the coronavirus pandemic     he does have issues with obstructive sleep apnea and has not dealt with it     He is watching his diet and exercising and lost 10 pounds but this is intentional and otherwise he feels well    His B12 levels were low at some point we will recheck it when he comes back in 6 months.  We discussed in length follow-up after the 5-year.  I told him at this point there is no routine recommendations for imaging of the pancreas chronically and we will see how the guidelines change    Active Ambulatory Problems     Diagnosis Date Noted   • Rectal cancer (CMS/HCC) 03/30/2016   • B12 deficiency 10/18/2016     Resolved Ambulatory Problems     Diagnosis Date Noted   • No Resolved Ambulatory Problems     Past Medical History:   Diagnosis Date  Called and spoke with pt. Discussed that BNP lab could not be added on to labs drawn on 8/4; however, ROD Couch said she did not have to have BNP lab drawn at this time.      Also reviewed MRI prep instructions again with pt and discussed that instructions will also be mailed to her. She verbalized understanding and agrees with this plan.    CHRIS Fox 12:14 PM 8/6/2020       • Anxiety    • Chest pain    • Colon cancer (CMS/HCC) 2016   • Difficulty in urination    • History of sleep apnea    • Ileostomy in place (CMS/HCC)    • Seasonal allergies    • Urinary frequency    • Urinary incontinence    • Vitamin B 12 deficiency 10/18/2016     Past Surgical History:   Procedure Laterality Date   • COLONOSCOPY W/ BIOPSIES N/A 2016    Dr. Tree Avila- MALIGNANT    • CYSTOSCOPY URETHROTOMY VISUAL INTERNAL N/A 7/28/2016    Procedure: dilation of uretheral stricture and catheter placement;  Surgeon: Richard Boston MD;  Location: [facility] OR;  Service:    • FOOT SURGERY Left 1983    Benign cyst left foot   • AR INSJ TUNNELED CVC W/O SUBQ PORT/ AGE 4 YR/> Left 6/22/2016    Procedure: Power Port Placement;  Surgeon: Keely Bermudez MD;  Location: [facility] OR;  Service: General   • SINUS SURGERY  1996   • TRANSANAL RECTAL RESECTION N/A 05/09/2016    Rectal resection and hand-sewn colon-pouch-anal anastomosis with splenic flexure mobilization, and clipping of right pelvic sidewall for postoperative radiation boost, omental pedicle, Dr. Emily Aguilera         Oncology/Hematology History    1.  Rectal cancer diagnosed January 2016 after presenting with a several month history of rectal bleeding, diarrhea and 10 lb weight loss; clinical stage T3N1M0  2.  Completed neoadjuvant Xeloda with radiation (5040cGy) 3/2/16  3. Surgery May 9 2016  pathology revealed a residual 5cm invasive mucinous adenocarcinoma, low grade, invading through the muscularis propria and into the perirectal adipose tissue with an initial positive radial margin. There was no evidence of lymphvascular space invasion and thirteen lymph nodes were negative for metastatic involvement. Additional distal and radial margin re-excision specimens were negative for carcinoma and excision of right pelvic lateral sidewall was negative for carcinoma. According to Dr. Aguilera's operative report, the rectum/rectal tumor was  densely adhered to the right pelvic side wall and she placed clips in this area for possible adjuvant radiation boost. The initial frozen margins came back negative but due to her concern, she submitted additional right lateral and distal margins and according to the pathology report these additional specimens were negative for carcinoma. However, a comment on the path report states that an permanent sections an additional section of tumor bed was taken, revealing invasive carcinoma that focally extended to the radial margin and this positive margin was within 5mm fo the area taken on frozen section and it is unclear whether the additional radial margin would have covered this; he completed a post-surgery radiation boost to the right pelvic sidewall  4.  Initiated adjuvant FOLFOX-6 6/29/16 and 8 cycles completed 10/18/16 with dose reductions secondary to neuropathy and diarrhea        Rectal cancer (CMS/HCC)    1/19/2016 Initial Diagnosis     Rectal cancer     5/17   He was treated neoadjuvantly with Xeloda and radiation to a dose of 5040 cGy completed in early March 2016.  He then underwent surgical resection by Dr. Aguilera with final pathology showing a low-grade mucinous adenocarcinoma T3 N0 M0 with a questionable positive radial pelvic sidewall margin.  Surgical clips were placed in the area of concern in the pelvic sidewall, and he completed a radiation boost to this area.  The tumor had a regression score of 2 and histologic features suggestive of MSI; immunohistochemistry of tumor cells was positive for MLH-1, MSH-2, and MSH-6.  He completed 8 cycles of adjuvant FOLFOX 6/10/1816 with dose reductions for neuropathy and diarrhea.    Patient returns today to review his CAT scans and overall is doing fairly well.  He has no cough or shortness of breath weight and appetite are stable.  His CEA was 1.92 which is normal.  CAT scans show minimal small pleural effusions which were not seen in November prior to his  second surgery.  There are very small and not tappable and I suspect there residual from his surgery and I will try and get some x-rays from [facility] if they were done postoperatively.  I recommended we repeat his chest CT in 3 months to make sure there is resolution and no progression of these findings and overall I'm not very worried that these are related to metastatic disease.    He has had blood drawn for genetic testing consistent with Mathias syndrome with a PMS 2 mutation and the VUS in the DICER gene      SOCIAL HISTORY: He is , has 1 daughter. He is currently employed as a . He has never smoked. Drinks 2-3 glasses of wine per month.  Denies illicit drug use or HIV risk factors.     FAMILY HISTORY: His maternal grandmother had colon cancer in her 70s. No other malignancies in the family. His father had heart disease, hypertension and diabetes.         Review of Systems   Constitutional: Negative for activity change, chills, fatigue and unexpected weight change.   HENT: Negative for trouble swallowing.    Respiratory: Negative for cough, choking, chest tightness, shortness of breath and wheezing.    Cardiovascular: Negative for chest pain and leg swelling.   Gastrointestinal: Negative for abdominal pain, diarrhea, nausea and rectal pain.   Musculoskeletal: Positive for back pain (stable 6/4/2020). Negative for arthralgias, joint swelling and myalgias.   Skin: Negative for color change.   Neurological: Negative for dizziness, weakness and headaches.   Psychiatric/Behavioral: Positive for sleep disturbance (6/4/2020). The patient is not nervous/anxious.    All other systems reviewed and are negative.     A comprehensive 14 point review of systems was performed and was negative except as mentioned.    Medications:  The current medication list was reviewed in the EMR    ALLERGIES:  No Known Allergies    Objective      Vitals:    06/04/20 0956   BP: 112/68  "  Pulse: 62   Resp: 16   Temp: 98 °F (36.7 °C)   SpO2: 98%   Weight: 75.4 kg (166 lb 3.2 oz)   Height: 178.5 cm (70.28\")   PainSc: 0-No pain     Current Status 6/4/2020   ECOG score 0       Physical Exam    GENERAL:  Well-developed, well-nourished in no acute distress.   SKIN:  Warm, dry without rashes, purpura or petechiae.  EYES:  Pupils equal, round and reactive to light.  EOMs intact.  Conjunctivae normal.  EARS:  Hearing intact.  NOSE:  Septum midline.  No excoriations or nasal discharge.  MOUTH:  Tongue is well-papillated; no stomatitis or ulcers.  Lips normal.  THROAT:  Oropharynx without lesions or exudates.  NECK:  Supple with good range of motion; no thyromegaly or masses, no JVD.  LYMPHATICS:  No cervical, supraclavicular, axillary or inguinal adenopathy.  ABDOMEN:  Not examined  EXTREMITIES:  No clubbing, cyanosis or edema.Eczema on both hands without any joint changes  PSYCHIATRIC:  Normal affect and mood.  '    RECENT LABS:  Hematology WBC   Date Value Ref Range Status   05/28/2020 4.56 3.40 - 10.80 10*3/mm3 Final     RBC   Date Value Ref Range Status   05/28/2020 4.65 4.14 - 5.80 10*6/mm3 Final     Hemoglobin   Date Value Ref Range Status   05/28/2020 14.6 13.0 - 17.7 g/dL Final     Hematocrit   Date Value Ref Range Status   05/28/2020 42.3 37.5 - 51.0 % Final     Platelets   Date Value Ref Range Status   05/28/2020 203 140 - 450 10*3/mm3 Final     Lab Results   Component Value Date    GLUCOSE 95 05/28/2020    BUN 10 05/28/2020    CREATININE 0.60 05/28/2020    EGFRIFNONA 104 05/28/2020    EGFRIFAFRI  09/20/2016      Comment:      <15 Indicative of kidney failure.    BCR 12.8 05/28/2020    K 3.8 05/28/2020    CO2 28.0 05/28/2020    CALCIUM 9.3 05/28/2020    ALBUMIN 4.80 05/28/2020    AST 28 05/28/2020    ALT 44 (H) 05/28/2020     cea 1.61       Ct CAP  FINDINGS:  CT CHEST: The visualized thyroid gland is normal. No pathological  mediastinal or hilar lymphadenopathy. No pericardial effusion is " seen.  The central airways are patent without endobronchial lesion. There are  tiny bilateral layering effusions present. These are without interval  change from prior imaging. No suspicious pulmonary nodule or focal  airspace disease is present. No pathological axillary lymphadenopathy.     CT ABDOMEN AND PELVIS: The liver demonstrates normal attenuation. No  focal hepatic lesion is seen. The spleen, gallbladder and the pancreas  is normal. Bilateral adrenal glands are normal. Both kidneys are normal  in size and attenuation. Small exophytic cortical cyst is seen at the  superior pole of the left kidney. The urinary bladder is minimally  distended with mild circumferential wall thickening. A primary  anastomosis is seen in the rectal region that appears unremarkable. No  evidence of bowel obstruction. A moderate to large amount of stool is  seen throughout the colon. There is no pathological retroperitoneal  lymphadenopathy.     IMPRESSION:  1. No convincing evidence of local recurrence or metastatic disease  within the chest, abdomen or pelvis.  2. Additional findings as above.           CT OF THE CHEST ABDOMEN AND PELVIS WITH CONTRAST 09/18/2019   IMPRESSION:  1. Tiny bilateral pleural effusions appear stable.  2. No new evidence of metastatic disease on this CT of the chest,  abdomen and pelvis.     This report was finalized on 9/19/2019 10:59 AM by Dr. Sampson Ridley M.D.    CT CHEST, ABDOMEN AND PELVIS WITH CONTRAST     IMPRESSION:  Stable exam. No convincing evidence of new metastatic  disease within the chest, abdomen or pelvis.      This report was finalized on 5/29/2020 3:08 PM by Dr. Margi Quinteros M.D.      Assessment/Plan   1.t neoadjuvant chemoradiation for a clinical T3 N1 M0 rectal cancer.  At the time of surgery final pathology showed YT3 N0 M0 disease with 13 negative lymph nodes.  There was a questionable positive radial margin involving the right pelvic sidewall and he completed a radiation  boost to this area. He completed 8 cycles of FOLFOX 6 with dose reductions in October 2016.    · CAT scans negative as of 5/20/2020 with normal CEA  2.  Reversal of colostomy in 11/16  3.  Follow-up CAT scans and 5/17 showed bilateral very small pleural effusions of unknown etiology-Resolved in 11/17-back in 2018 but stable  4. mild peripheral neuropathy   5. Mathias syndrome PMS 2 gene mutation On aspirin 650 mg    Plan  Return in 6 months with CEA and CAT scans  We will continue scanning every 6 months through 5 years and check CEAs every 3 months through year 3 and then every 6 months through year 5  25 minutes, over half of that time counseling.  I reviewed the recommendations in up-to-date about screening for Mathias syndrome.                6/4/2020      CC:

## 2020-08-10 ENCOUNTER — CARE COORDINATION (OUTPATIENT)
Dept: CARDIOLOGY | Facility: CLINIC | Age: 55
End: 2020-08-10

## 2020-08-10 NOTE — PROGRESS NOTES
Received VM from pt requesting to confirm if sedation will be given at clinic prior to cMRI or if it will be prescribed and she will have to pick it up prior to cMRI tomorrow.      Spoke with Ekaterina in cMRI who confirms that they have standing order to give sedation medication if needed and medication is provided by MRI department if needed.  Ekaterina does recommend pt come an hour prior to scheduled start time if thinking they will need sedation.      Called and spoke with pt and reviewed that sedation medication will be provided at MRI department tomorrow and did recommend she arrive an hour before her scheduled start time (arrive at 7:00am). She verbalized understanding and agrees with this plan.     She also notes her her wt is down to 205.6# today.  She reports her wt had been at 209# over the weekend. She confirms she is taking lasix 20mg daily and denies any lightheadedness/dizziness today. She does note that she has been feeling SOB with exertion (even just going to the bathroom) and is happy with the wt loss today. Pt also states that she forgot to discuss possible sleep apnea and need for CPAP with her PCP at visit on 8/4 (in care everywhere).     Per chart review, her hospital admission wt on 7/25 was 219#; hospital discharge wt on 7/29 was 213#.  Pt was not discharged home from the hospital on oral diuretic, so started lasix 20mg BID X 3 days on 7/31/20 (due to 3.5# wt gain after hospital discharge) and then went to laisx 20mg daily on 8/3/20.     Pt had CMP done most recently on 8/4/20 at [facility] clinic (results in Care everywhere).     Discussed with pt that will update ROD Couch in preparation for her visit on 8/12/20- pt agrees with this plan.    CHRIS Fox 12:01 PM 8/10/2020

## 2020-08-11 ENCOUNTER — HOSPITAL ENCOUNTER (OUTPATIENT)
Dept: CARDIOLOGY | Facility: CLINIC | Age: 55
Discharge: HOME | End: 2020-08-11
Attending: PHYSICIAN ASSISTANT | Admitting: PHYSICIAN ASSISTANT
Payer: COMMERCIAL

## 2020-08-11 VITALS — DIASTOLIC BLOOD PRESSURE: 71 MMHG | HEART RATE: 83 BPM | SYSTOLIC BLOOD PRESSURE: 132 MMHG

## 2020-08-11 DIAGNOSIS — R93.1 ABNORMAL ECHOCARDIOGRAM: ICD-10-CM

## 2020-08-11 PROCEDURE — 25000132 ZZH RX MED GY IP 250 OP 250 PS 637: Performed by: PHYSICIAN ASSISTANT

## 2020-08-11 PROCEDURE — 75565 CARD MRI VELOC FLOW MAPPING: CPT

## 2020-08-11 PROCEDURE — A9585 GADOBUTROL INJECTION: HCPCS | Performed by: PHYSICIAN ASSISTANT

## 2020-08-11 PROCEDURE — 75565 CARD MRI VELOC FLOW MAPPING: CPT | Mod: 26 | Performed by: INTERNAL MEDICINE

## 2020-08-11 PROCEDURE — 75561 CARDIAC MRI FOR MORPH W/DYE: CPT | Mod: 26 | Performed by: INTERNAL MEDICINE

## 2020-08-11 PROCEDURE — 25500064 ZZH RX 255 OP 636: Performed by: PHYSICIAN ASSISTANT

## 2020-08-11 RX ORDER — GADOBUTROL 604.72 MG/ML
13 INJECTION INTRAVENOUS ONCE
Status: COMPLETED | OUTPATIENT
Start: 2020-08-11 | End: 2020-08-11

## 2020-08-11 RX ORDER — DIPHENHYDRAMINE HYDROCHLORIDE 50 MG/ML
25-50 INJECTION INTRAMUSCULAR; INTRAVENOUS
Status: DISCONTINUED | OUTPATIENT
Start: 2020-08-11 | End: 2020-08-12 | Stop reason: HOSPADM

## 2020-08-11 RX ORDER — DIAZEPAM 5 MG
5 TABLET ORAL EVERY 30 MIN PRN
Status: DISCONTINUED | OUTPATIENT
Start: 2020-08-11 | End: 2020-08-12 | Stop reason: HOSPADM

## 2020-08-11 RX ORDER — AMINOPHYLLINE 25 MG/ML
100 INJECTION, SOLUTION INTRAVENOUS ONCE
Status: DISCONTINUED | OUTPATIENT
Start: 2020-08-11 | End: 2020-08-12 | Stop reason: HOSPADM

## 2020-08-11 RX ORDER — REGADENOSON 0.08 MG/ML
0.4 INJECTION, SOLUTION INTRAVENOUS ONCE
Status: DISCONTINUED | OUTPATIENT
Start: 2020-08-11 | End: 2020-08-12 | Stop reason: HOSPADM

## 2020-08-11 RX ORDER — ONDANSETRON 2 MG/ML
4 INJECTION INTRAMUSCULAR; INTRAVENOUS
Status: DISCONTINUED | OUTPATIENT
Start: 2020-08-11 | End: 2020-08-12 | Stop reason: HOSPADM

## 2020-08-11 RX ORDER — ACYCLOVIR 200 MG/1
0-1 CAPSULE ORAL
Status: DISCONTINUED | OUTPATIENT
Start: 2020-08-11 | End: 2020-08-12 | Stop reason: HOSPADM

## 2020-08-11 RX ORDER — ALBUTEROL SULFATE 90 UG/1
2 AEROSOL, METERED RESPIRATORY (INHALATION) EVERY 5 MIN PRN
Status: DISCONTINUED | OUTPATIENT
Start: 2020-08-11 | End: 2020-08-12 | Stop reason: HOSPADM

## 2020-08-11 RX ORDER — DIPHENHYDRAMINE HCL 25 MG
25 CAPSULE ORAL
Status: DISCONTINUED | OUTPATIENT
Start: 2020-08-11 | End: 2020-08-12 | Stop reason: HOSPADM

## 2020-08-11 RX ORDER — METHYLPREDNISOLONE SODIUM SUCCINATE 125 MG/2ML
125 INJECTION, POWDER, LYOPHILIZED, FOR SOLUTION INTRAMUSCULAR; INTRAVENOUS
Status: DISCONTINUED | OUTPATIENT
Start: 2020-08-11 | End: 2020-08-12 | Stop reason: HOSPADM

## 2020-08-11 RX ADMIN — DIAZEPAM 5 MG: 5 TABLET ORAL at 07:30

## 2020-08-11 RX ADMIN — GADOBUTROL 13 ML: 604.72 INJECTION INTRAVENOUS at 09:47

## 2020-08-11 NOTE — PROGRESS NOTES
Cardiac Core protocol  RV stack, atrial stack  Flows- asc ao and pa  Contrast administered per weight based protocol.  Lenny Barron

## 2020-08-12 ENCOUNTER — VIRTUAL VISIT (OUTPATIENT)
Dept: CARDIOLOGY | Facility: CLINIC | Age: 55
End: 2020-08-12
Attending: PHYSICIAN ASSISTANT
Payer: COMMERCIAL

## 2020-08-12 DIAGNOSIS — I50.30 HEART FAILURE WITH PRESERVED EJECTION FRACTION, NYHA CLASS II (H): ICD-10-CM

## 2020-08-12 PROCEDURE — 99213 OFFICE O/P EST LOW 20 MIN: CPT | Mod: 95 | Performed by: PHYSICIAN ASSISTANT

## 2020-08-12 NOTE — PROGRESS NOTES
"Vitals - Patient Reported  Weight (Patient Reported): 93 kg (205 lb)  Height (Patient Reported): 160 cm (5' 3\")  BMI (Based on Pt Reported Ht/Wt): 36.31    Review Of Systems  Skin: NEGATIVE  Eyes:Ears/Nose/Throat: NEGATIVE  Respiratory: Shortness of breath  Cardiovascular: Light headedness  Gastrointestinal: NEGATIVE  Genitourinary:NEGATIVE   Musculoskeletal: NEGATIVE  Neurologic: Headaches  Psychiatric: NEGATIVE  Hematologic/Lymphatic/Immunologic: NEGATIVE  Endocrine:  NEGATIVE    Radha Gomez is a 55 year old female who is being evaluated via a billable video visit.  This visit is being conducted as a virtual visit due to the emphasis on mitigation of the COVID-19 virus pandemic. The clinician has decided that the risk of an in-office visit outweighs the benefit for this patient.     RAMY Ferris    I have reviewed and updated the patient's Past Medical History, Social History, Family History and Medication List.    PROBLEM LIST  Patient Active Problem List   Diagnosis     Depression (emotion)     Major depressive disorder, recurrent episode, severe (H)     Depression     Depressive episode     Chest pain     Palpitations     Anxiety     PTSD (post-traumatic stress disorder)     Borderline personality disorder (H)     Bipolar 1 disorder (H)     Interstitial cystitis     HTN (hypertension)     Coronary artery disease     HLD (hyperlipidemia)     Tooth abscess -- S/P 2 teeth extracted 8/30/19     Chronic pain -- on Methadone     DM 2 -- Hgb A1C 6.0 on 5/29/19     Shortness of breath       ALLERGIES  Abilify [aripiprazole]; Paxil [paroxetine]; Contrast dye; Ditropan [oxybutynin chloride]; and Wellbutrin [bupropion]    MEDICATIONS  Current Outpatient Medications   Medication Sig Dispense Refill     ALPRAZolam (XANAX) 0.25 MG tablet Take 1.5 mg by mouth daily as needed for anxiety 0.25 mg x 6 tablets       aspirin 81 MG EC tablet Take 1 tablet (81 mg) by mouth daily 90 tablet 3     busPIRone HCl (BUSPAR) 30 MG " tablet Take by mouth 2 times daily        diphenhydrAMINE (BENADRYL ALLERGY) 25 MG tablet Take one tab one hour prior to MRI 3 tablet 0     dulaglutide (TRULICITY) 0.75 MG/0.5ML pen Inject 0.75 mg Subcutaneous every 7 days       furosemide (LASIX) 20 MG tablet Take 1 tablet (20 mg) by mouth daily 90 tablet 3     HYDROcodone-acetaminophen (NORCO) 5-325 MG per tablet Take 2 tablets by mouth 3 times daily as needed for moderate to severe pain        lurasidone (LATUDA) 20 MG TABS tablet Take 1 tablet (20 mg) by mouth every evening 30 tablet 0     metFORMIN (GLUCOPHAGE) 1000 MG tablet Take 1,000 mg by mouth 2 times daily (with meals)       methadone (DOLOPHINE) 10 MG tablet Take 10 mg by mouth 2 times daily       omeprazole (PRILOSEC) 20 MG DR capsule Take 20 mg by mouth daily OR ZANTAC       phenazopyridine (PYRIDIUM) 200 MG tablet Take 200 mg by mouth 3 times daily as needed for irritation       potassium chloride ER (KLOR-CON M) 20 MEQ CR tablet Take 1 tablet (20 mEq) by mouth daily 90 tablet 3     prazosin (MINIPRESS) 5 MG capsule Take 5 mg by mouth every morning       prazosin (MINIPRESS) 5 MG capsule Take 10 mg by mouth At Bedtime       predniSONE (DELTASONE) 20 MG tablet Take 40 mg (2 tabs) the morning prior to, the evening prior to, and the morning of MRI 6 tablet 0     propranolol ER (INDERAL LA) 80 MG 24 hr capsule Take 80 mg by mouth daily       QUEtiapine (SEROQUEL) 300 MG tablet Take 300 mg by mouth every evening       ranitidine (ZANTAC) 150 MG tablet Take 150 mg by mouth 2 times daily OR OMEPRAZOLE       venlafaxine (EFFEXOR-XR) 150 MG 24 hr capsule Take 300 mg by mouth daily         Radha Gomez presents for a hospital follow up visit.     HPI:  In brief, Radha was recently admitted with exertional dyspnea after weeks of nausea and vomiting. BNP elevated at 27,000. Interstitial and groundglass infiltrates were noted on CT. TTE showed moderate RV dysfunction. LVEF was preserved. V/Q scan was negative.  It was felt her respiratory failure may be related to stress CMP and she was diuresed gently with significant improvement in her symptoms. Her hospital admission wt on 7/25 was 219#; hospital discharge wt on 7/29 was 213#. Her stay was complicated by presenting APOLLO and transaminitis which was felt secondary to NSAID use. Her creatinine on presentation was 4.1, and improved to 0.8 on discharge. It was also complicated by a troponin rise, suspect type II. She had no chest pain and had a normal cath in 2016.  Unfortunately, she was erroneously not discharged home from the hospital with oral diuretic, and called our clinic shortly after that with concerns of recurrent GUSMAN. She was started on lasix 20mg BID X 3 days and then reduced to daily. Her weight came down gradually, today she is at 205 lbs and quite happy with this. CMP from 8/4 is WNL.    However, she is still dyspneic with exertion. [place] This is not her baseline.    She had a cardiac MRI on 8/11 to follow up on her RV dysfunction noted as inpatient. This was quite normal and showed no evidence of RV dysfunction:  Normal biventricular size and systolic function.   Quantitative LVEF 72%. Quantitative RVEF 66%.  Delayed hyperenhancement reveals no evidence of scar or infiltrative disease.  Compared to recent echocardiogram, RV size and function is now normal.     We reviewed those results today.     Assessment/Plan:  1. HFpEF, with recent admission for the same - RV dysfunction resolved per follow up cardiac MRI  2. Ongoing GUSMAN, suspect deconditioning contributing    - Continue low dose Lasix.   - Sleep med referral.   - Routine exercise encouraged.     Follow-up: 6 months with Dr. Aguilar + BMP, proBNP.     This was changed to a telephone visit due to technical issues on the patient's end.   Phone time: 20 minutes    Jes Miranda PA-C  [facility]  Pager: [phone]  Text Page  (7:30am -  4pm M-F)

## 2020-08-12 NOTE — LETTER
"8/12/2020    [facility] [place] [address]  [facility] [id number]    RE: Radha Gomez       Dear Colleague,    I had the pleasure of seeing Radha Gomez in the [facility].    Vitals - Patient Reported  Weight (Patient Reported): 93 kg (205 lb)  Height (Patient Reported): 160 cm (5' 3\")  BMI (Based on Pt Reported Ht/Wt): 36.31    Review Of Systems  Skin: NEGATIVE  Eyes:Ears/Nose/Throat: NEGATIVE  Respiratory: Shortness of breath  Cardiovascular: Light headedness  Gastrointestinal: NEGATIVE  Genitourinary:NEGATIVE   Musculoskeletal: NEGATIVE  Neurologic: Headaches  Psychiatric: NEGATIVE  Hematologic/Lymphatic/Immunologic: NEGATIVE  Endocrine:  NEGATIVE    Radha Gomez is a 55 year old female who is being evaluated via a billable video visit.  This visit is being conducted as a virtual visit due to the emphasis on mitigation of the COVID-19 virus pandemic. The clinician has decided that the risk of an in-office visit outweighs the benefit for this patient.     RAMY Ferris    I have reviewed and updated the patient's Past Medical History, Social History, Family History and Medication List.    PROBLEM LIST  Patient Active Problem List   Diagnosis     Depression (emotion)     Major depressive disorder, recurrent episode, severe (H)     Depression     Depressive episode     Chest pain     Palpitations     Anxiety     PTSD (post-traumatic stress disorder)     Borderline personality disorder (H)     Bipolar 1 disorder (H)     Interstitial cystitis     HTN (hypertension)     Coronary artery disease     HLD (hyperlipidemia)     Tooth abscess -- S/P 2 teeth extracted 8/30/19     Chronic pain -- on Methadone     DM 2 -- Hgb A1C 6.0 on 5/29/19     Shortness of breath       ALLERGIES  Abilify [aripiprazole]; Paxil [paroxetine]; Contrast dye; Ditropan [oxybutynin chloride]; and Wellbutrin [bupropion]    MEDICATIONS  Current Outpatient Medications "   Medication Sig Dispense Refill     ALPRAZolam (XANAX) 0.25 MG tablet Take 1.5 mg by mouth daily as needed for anxiety 0.25 mg x 6 tablets       aspirin 81 MG EC tablet Take 1 tablet (81 mg) by mouth daily 90 tablet 3     busPIRone HCl (BUSPAR) 30 MG tablet Take by mouth 2 times daily        diphenhydrAMINE (BENADRYL ALLERGY) 25 MG tablet Take one tab one hour prior to MRI 3 tablet 0     dulaglutide (TRULICITY) 0.75 MG/0.5ML pen Inject 0.75 mg Subcutaneous every 7 days       furosemide (LASIX) 20 MG tablet Take 1 tablet (20 mg) by mouth daily 90 tablet 3     HYDROcodone-acetaminophen (NORCO) 5-325 MG per tablet Take 2 tablets by mouth 3 times daily as needed for moderate to severe pain        lurasidone (LATUDA) 20 MG TABS tablet Take 1 tablet (20 mg) by mouth every evening 30 tablet 0     metFORMIN (GLUCOPHAGE) 1000 MG tablet Take 1,000 mg by mouth 2 times daily (with meals)       methadone (DOLOPHINE) 10 MG tablet Take 10 mg by mouth 2 times daily       omeprazole (PRILOSEC) 20 MG DR capsule Take 20 mg by mouth daily OR ZANTAC       phenazopyridine (PYRIDIUM) 200 MG tablet Take 200 mg by mouth 3 times daily as needed for irritation       potassium chloride ER (KLOR-CON M) 20 MEQ CR tablet Take 1 tablet (20 mEq) by mouth daily 90 tablet 3     prazosin (MINIPRESS) 5 MG capsule Take 5 mg by mouth every morning       prazosin (MINIPRESS) 5 MG capsule Take 10 mg by mouth At Bedtime       predniSONE (DELTASONE) 20 MG tablet Take 40 mg (2 tabs) the morning prior to, the evening prior to, and the morning of MRI 6 tablet 0     propranolol ER (INDERAL LA) 80 MG 24 hr capsule Take 80 mg by mouth daily       QUEtiapine (SEROQUEL) 300 MG tablet Take 300 mg by mouth every evening       ranitidine (ZANTAC) 150 MG tablet Take 150 mg by mouth 2 times daily OR OMEPRAZOLE       venlafaxine (EFFEXOR-XR) 150 MG 24 hr capsule Take 300 mg by mouth daily         Radha Gomez presents for a hospital follow up visit.     HPI:  In  brief, Radha was recently admitted with exertional dyspnea after weeks of nausea and vomiting. BNP elevated at 27,000. Interstitial and groundglass infiltrates were noted on CT. TTE showed moderate RV dysfunction. LVEF was preserved. V/Q scan was negative. It was felt her respiratory failure may be related to stress CMP and she was diuresed gently with significant improvement in her symptoms. Her hospital admission wt on 7/25 was 219#; hospital discharge wt on 7/29 was 213#. Her stay was complicated by presenting APOLLO and transaminitis which was felt secondary to NSAID use. Her creatinine on presentation was 4.1, and improved to 0.8 on discharge. It was also complicated by a troponin rise, suspect type II. She had no chest pain and had a normal cath in 2016.  Unfortunately, she was erroneously not discharged home from the hospital with oral diuretic, and called our clinic shortly after that with concerns of recurrent GUSMAN. She was started on lasix 20mg BID X 3 days and then reduced to daily. Her weight came down gradually, today she is at 205 lbs and quite happy with this. CMP from 8/4 is WNL.    However, she is still dyspneic with exertion. [place] This is not her baseline.    She had a cardiac MRI on 8/11 to follow up on her RV dysfunction noted as inpatient. This was quite normal and showed no evidence of RV dysfunction:  Normal biventricular size and systolic function.   Quantitative LVEF 72%. Quantitative RVEF 66%.  Delayed hyperenhancement reveals no evidence of scar or infiltrative disease.  Compared to recent echocardiogram, RV size and function is now normal.     We reviewed those results today.     Assessment/Plan:  1. HFpEF, with recent admission for the same - RV dysfunction resolved per follow up cardiac MRI  2. Ongoing GUSMAN, suspect deconditioning contributing    - Continue low dose Lasix.   - Sleep med referral.   - Routine exercise encouraged.      Follow-up: 6 months with Dr. Aguilar + BMP, proBNP.     This was changed to a telephone visit due to technical issues on the patient's end.   Phone time: 20 minutes    Jes Miranda PA-C  [facility]  Pager: [phone]  Text Page  (7:30am - 4pm M-F)       Thank you for allowing me to participate in the care of your patient.    Sincerely,     Jes Miranda PA-C     [facility]

## 2020-08-28 VITALS — HEIGHT: 63 IN | WEIGHT: 206 LBS | BODY MASS INDEX: 36.5 KG/M2

## 2020-08-28 ASSESSMENT — MIFFLIN-ST. JEOR: SCORE: 1493.54

## 2020-08-28 NOTE — PATIENT INSTRUCTIONS
"1. We will schedule you for a sleep study in our sleep lab at [facility].  2. We will refer you for Cognitive Behavioral Therapy for Insomnia (CBT-I) to help with sleep-related anxiety and night terrors.    SLEEP HYGIENE  - stop using electronic devices with bright screens within 1 hour of bedtime; if able, use a \"night mode\" on your devices  - exercise more than 6 hours before bedtime  - create a good sleep environment: do not eat or watch tv in bed, make your bedroom as dark and quiet as possible  - do not to eat a large meal before bed; a light snack is ok  - no caffeine within 6 hours of bedtime (coffee, tea, soda, energy drinks, chocolate, some pain relievers)  - no more than 3 caffeinated beverages per day  - no alcohol within 4-6 hours of bedtime  - no nicotine before bedtime    Patient Resources:  1. https://aasm.org/resources/pdf/products/howtosleepbetter_web.pdf    Your BMI is Body mass index is 36.49 kg/m .  Weight management is a personal decision.  If you are interested in exploring weight loss strategies, the following discussion covers the approaches that may be successful. Body mass index (BMI) is one way to tell whether you are at a healthy weight, overweight, or obese. It measures your weight in relation to your height.  A BMI of 18.5 to 24.9 is in the healthy range. A person with a BMI of 25 to 29.9 is considered overweight, and someone with a BMI of 30 or greater is considered obese. More than two-thirds of American adults are considered overweight or obese.  Being overweight or obese increases the risk for further weight gain. Excess weight may lead to heart disease and diabetes.  Creating and following plans for healthy eating and physical activity may help you improve your health.  Weight control is part of healthy lifestyle and includes exercise, emotional health, and healthy eating habits. Careful eating habits lifelong are the mainstay of weight control. Though there are significant " health benefits from weight loss, long-term weight loss with diet alone may be very difficult to achieve- studies show long-term success with dietary management in less than 10% of people. Attaining a healthy weight may be especially difficult to achieve in those with severe obesity. In some cases, medications, devices and surgical management might be considered.  What can you do?  If you are overweight or obese and are interested in methods for weight loss, you should discuss this with your provider.     Consider reducing daily calorie intake by 500 calories.     Keep a food journal.     Avoiding skipping meals, consider cutting portions instead.    Diet combined with exercise helps maintain muscle while optimizing fat loss. Strength training is particularly important for building and maintaining muscle mass. Exercise helps reduce stress, increase energy, and improves fitness. Increasing exercise without diet control, however, may not burn enough calories to loose weight.       Start walking three days a week 10-20 minutes at a time    Work towards walking thirty minutes five days a week     Eventually, increase the speed of your walking for 1-2 minutes at time    In addition, we recommend that you review healthy lifestyles and methods for weight loss available through the National Institutes of [employer] patient information sites:  http://win.niddk.nih.gov/publications/index.htm    And look into health and wellness programs that may be available through your health insurance provider, employer, local community center, or josh club.    Weight management plan: Patient was referred to their PCP to discuss a diet and exercise plan.

## 2020-08-31 ENCOUNTER — VIRTUAL VISIT (OUTPATIENT)
Dept: SLEEP MEDICINE | Facility: CLINIC | Age: 55
End: 2020-08-31
Payer: COMMERCIAL

## 2020-08-31 DIAGNOSIS — G47.30 SLEEP-RELATED BREATHING DISORDER: Primary | ICD-10-CM

## 2020-08-31 DIAGNOSIS — G47.00 DISORDER OF INITIATING AND MAINTAINING SLEEP: ICD-10-CM

## 2020-08-31 DIAGNOSIS — F51.4 NIGHT TERRORS, ADULT: ICD-10-CM

## 2020-08-31 DIAGNOSIS — I50.30 HEART FAILURE WITH PRESERVED EJECTION FRACTION, NYHA CLASS II (H): ICD-10-CM

## 2020-08-31 PROBLEM — K04.7 TOOTH ABSCESS: Status: RESOLVED | Noted: 2019-08-30 | Resolved: 2020-08-31

## 2020-08-31 PROCEDURE — 99204 OFFICE O/P NEW MOD 45 MIN: CPT | Mod: 95 | Performed by: PSYCHIATRY & NEUROLOGY

## 2020-08-31 NOTE — PROGRESS NOTES
"  [facility]  Referral for: nocturnal hypoxia during recent hospitalization  Patient Name: Radha Gomez  MRN: [id number]  : 1965  Date of Clinic Visit: 2020  Primary Care Provider: J Carlos Parsons  Referring Provider: Jes Miranda    Radha Gomez is a 55 year old female who is being evaluated via a billable video visit.     The patient has been notified of following: \"This video visit will be conducted via a call between you and your physician/provider. We have found that certain health care needs can be provided without the need for an in-person physical exam. This service lets us provide the care you need with a video conversation. If a prescription is necessary we can send it directly to your pharmacy. If lab work is needed we can place an order for that and you can then stop by our lab to have the test done at a later time. Video visits are billed at different rates depending on your insurance coverage. Please reach out to your insurance provider with any questions. If during the course of the call the physician/provider feels a video visit is not appropriate, you will not be charged for this service.\"    Video-Visit Details  Type of service: Video Visit  Video Start Time: 07:45  Video End Time: 08:31  Originating Location (pt. Location): Home  Distant Location (provider location): [facility]  Platform used for Video Visit: Brett Valdez MD    CHIEF COMPLAINT: nocturnal hypoxia, night terrors    HISTORY OF PRESENT ILLNESS:  Radha Gomez is a 55 year old female w/ PMH of chronic pain, HTN, CAD, MDD/Bipolar I, and tobacco use, presenting for evaluation of nocturnal hypoxia and night terrors.    ESS = 3.  RAQUEL = 20 with difficulty falling asleep and maintaning sleep primarily secondary to anxiety and night terrors.    STOP-BANG  Snoring: Yes  Tired/Fatigued during the day: No  Observed apnea/choking: No  (Blood) pressure " "elevation: No  BMI > 35: Yes  Age > 50 years: Yes  Neck circumference (M 43cm, F 41cm): Unable to measure  Gender (male): No  Score = 3, Low risk    ASSESSMENT AND PLAN:  # Nocturnal Hypoxia/Sleep-related breathing disorder: she had nocturnal hypoxia during a hospital admission for heart and respiratory failure, so the question is whether this persists in the absence of acute cardiorespiratory injury. She has risk factors for Obesity Hypoventilation Syndrome (BMI > 35) and opioid-related hypoventilation. Her CO2 levels have been within normal range which argues against chronic hypoventilation but we will evaluate with overnight PSG.   - PSG w/ TCM    # Night terrors: currently treated with Prazosin to good effect, although her dreams persist. It's unlikely that medication management alone will be sufficient to completely halt dreams. Uncertainty regarding RBD as she does move a lot in her sleep, but no clear history of dream-enactment component.   - continue Prazosin 10mg PO at bedtime and 5mg PO QAM   - Will evaluate REM motor tone on PSG.    # Sleep maintenance insomnia: related to anxiety primarily, but there's an emotional overlay from her dreams that persists into her wakefulness that also intereferes with her ability to feel relaxed. She might therefore benefit from CBT-I.   - referral to CBT-I w/ Dr. Che    Patient advised to never drive if tired or sleepy.    The plan was formulated with Dr. Chou.    Andrew Valdez MD  Sleep Medicine Fellow  [facility]    Sleep Staff Addendum Video  I have seen and evaluate the patient on 8-31 with the sleep fellow and agree with the documentation.  I personally spent > 45 minutes in the care of the patient.     Demetrius Chou MD      PHYSICAL EXAMINATION:  Vitals: Ht 1.6 m (5' 3\")   Wt 93.4 kg (206 lb)   BMI 36.49 kg/m     GENERAL: Healthy, alert and no distress.  EYES: Eyes grossly normal to inspection.  No discharge or erythema, or " obvious scleral/conjunctival abnormalities.  RESP: No audible wheeze, cough, or visible cyanosis.  No visible retractions or increased work of breathing.  SKIN: Visible skin clear. No significant rash, abnormal pigmentation or lesions.  NEURO: mentation intact and speech normal.  PSYCH: Mentation appears normal, affect normal/bright, judgement and insight intact, normal speech and appearance well-groomed.    INVESTIGATIONS:  Cardiac MRI 8/11/20: FINAL IMPRESSION:  Normal biventricular size and systolic function.   Quantitative LVEF 72%. Quantitative RVEF 66%.  Delayed hyperenhancement reveals no evidence of scar or infiltrative disease.  Compared to recent echocardiogram, RV size and function is now normal.     ECHO 7/28/20: Interpretation Summary  Right ventricle is mild to moderately dilated. Right ventricular systolic  function is mildly reduced. Relative apical hypokinesis. IVC diameter and respiratory changes fall into an intermediate range suggesting an RA pressure of 8 mmHg. This study was compared to a prior TTE from 7/26/2020. RV size is mildly  smaller, and the severity of RV dysfunction has improved. However recommend  contrast imaging on future studies for reassessment, as the RV was better  appreciated with the use of contrast imaging on the prior study, and was not  utilized on this present study.    REVIEW OF SYSTEMS: 12-point RoS negative except as per HPI.    Allergies   Allergen Reactions     Abilify [Aripiprazole] Cramps     Total body- couldn't walk     Paxil [Paroxetine] Other (See Comments)     Total body pain     Contrast Dye Swelling     Ditropan [Oxybutynin Chloride]      Can't Urinate, Per Pt.     Ibuprofen      Wellbutrin [Bupropion] Palpitations     If not XL     Current Outpatient Medications   Medication Sig Dispense Refill     ALPRAZolam (XANAX) 0.25 MG tablet Take 1.5 mg by mouth daily as needed for anxiety 0.25 mg x 6 tablets       aspirin 81 MG EC tablet Take 1 tablet (81 mg) by  mouth daily 90 tablet 3     busPIRone HCl (BUSPAR) 30 MG tablet Take by mouth 2 times daily        dulaglutide (TRULICITY) 0.75 MG/0.5ML pen Inject 0.75 mg Subcutaneous every 7 days       furosemide (LASIX) 20 MG tablet Take 1 tablet (20 mg) by mouth daily 90 tablet 3     lurasidone (LATUDA) 20 MG TABS tablet Take 1 tablet (20 mg) by mouth every evening 30 tablet 0     metFORMIN (GLUCOPHAGE) 1000 MG tablet Take 1,000 mg by mouth 2 times daily (with meals)       methadone (DOLOPHINE) 10 MG tablet Take 10 mg by mouth 2 times daily       omeprazole (PRILOSEC) 20 MG DR capsule Take 20 mg by mouth daily OR ZANTAC       phenazopyridine (PYRIDIUM) 200 MG tablet Take 200 mg by mouth 3 times daily as needed for irritation       prazosin (MINIPRESS) 5 MG capsule Take 5 mg by mouth every morning       prazosin (MINIPRESS) 5 MG capsule Take 10 mg by mouth At Bedtime       propranolol ER (INDERAL LA) 80 MG 24 hr capsule Take 80 mg by mouth daily       QUEtiapine (SEROQUEL) 300 MG tablet Take 300 mg by mouth every evening       venlafaxine (EFFEXOR-XR) 150 MG 24 hr capsule Take 300 mg by mouth daily       Past Medical History:   Diagnosis Date     Anxiety      Arthritis      Back injury      Bipolar 1 disorder (H)      Chest pain      Coronary artery disease     non-obstructive, per cath 4/2016     Depressive disorder      HLD (hyperlipidemia)      IC (interstitial cystitis) age 21    feels like a  bladder infection- very painful; symptoms since 15     Osteoarthritis      Palpitations      PTSD (post-traumatic stress disorder)      Tooth abscess -- S/P 2 teeth extracted 8/30/19 8/30/2019     Past Surgical History:   Procedure Laterality Date     ABDOMEN SURGERY      3 laproscopies     BIOPSY      bladder     COLONOSCOPY  age 47     ESOPHAGOSCOPY, GASTROSCOPY, DUODENOSCOPY (EGD), COMBINED  6/24/2013    Procedure: COMBINED ESOPHAGOSCOPY, GASTROSCOPY, DUODENOSCOPY (EGD), BIOPSY SINGLE OR MULTIPLE;  gastroscopy;  Surgeon: Vahe  Nathalie NGUYEN MD;  Location:  GI     EXTRACTION(S) DENTAL N/A 8/30/2019    Procedure: EXTRACTION, TOOTH #30 AND #28;  Surgeon: Rachid Pérez DDS;  Location:  OR     GYN SURGERY      venereal warts removed     HC LEFT HEART CATHETERIZATION  4/15/16    non-obstructive CAD     INCISION AND DRAINAGE MANDIBLE, COMBINED N/A 8/30/2019    Procedure: INCISION AND DRAINAGE, MANDIBLE ABSCESS;  Surgeon: Rachid Pérez DDS;  Location:  OR     LAPAROSCOPIC CHOLECYSTECTOMY  6/25/2013    Procedure: LAPAROSCOPIC CHOLECYSTECTOMY;  LAPAROSCOPIC CHOLECYSTECTOMY.;  Surgeon: Salomón Wall MD;  Location:  OR     laporoscopy       ORTHOPEDIC SURGERY       Family History   Problem Relation Age of Onset     Depression Mother      Anxiety Disorder Mother      Substance Abuse Mother      Dementia Mother      Mental Illness Mother         sexually abused by step father      Coronary Artery Disease Mother      Diabetes Mother      Depression Father      Substance Abuse Father      Mental Illness Father         [employer] vet- blamed others, angry     Heart Failure Father      Dementia Maternal Grandmother      Suicide Sister 36        OD     Mental Illness Sister         DID, neglected, abused and in foster homes     Emphysema Sister      Bipolar Disorder Son      Depression Son      Mental Illness Son         ODD     Suicide Daughter 15        attempted X2, OD and cutting; OD     Depression Daughter      Anxiety Disorder Daughter      Mental Illness Daughter 12        trichitillimania. PTSD     Depression Sister      Anxiety Disorder Sister      Chronic Obstructive Pulmonary Disease Sister      Social History     Socioeconomic History     Marital status:      Spouse name: Not on file     Number of children: Not on file     Years of education: Not on file     Highest education level: Not on file   Occupational History     Not on file   Social Needs     Financial resource strain: Not on file     Food insecurity      Worry: Not on file     Inability: Not on file     Transportation needs     Medical: Not on file     Non-medical: Not on file   Tobacco Use     Smoking status: Current Every Day Smoker     Packs/day: 1.00     Years: 35.00     Pack years: 35.00     Types: Cigarettes     Last attempt to quit: 2012     Years since quittin.1     Smokeless tobacco: Never Used   Substance and Sexual Activity     Alcohol use: No     Drug use: No     Sexual activity: Yes     Partners: Male     Birth control/protection: Surgical   Lifestyle     Physical activity     Days per week: Not on file     Minutes per session: Not on file     Stress: Not on file   Relationships     Social connections     Talks on phone: Not on file     Gets together: Not on file     Attends Sabianist service: Not on file     Active member of club or organization: Not on file     Attends meetings of clubs or organizations: Not on file     Relationship status: Not on file     Intimate partner violence     Fear of current or ex partner: Not on file     Emotionally abused: Not on file     Physically abused: Not on file     Forced sexual activity: Not on file   Other Topics Concern     Parent/sibling w/ CABG, MI or angioplasty before 65F 55M? Not Asked      Service Not Asked     Blood Transfusions Not Asked     Caffeine Concern No     Comment: minimal.      Occupational Exposure Not Asked     Hobby Hazards Not Asked     Sleep Concern Not Asked     Stress Concern Not Asked     Weight Concern Not Asked     Special Diet Not Asked     Back Care Not Asked     Exercise No     Bike Helmet Not Asked     Seat Belt Not Asked     Self-Exams Not Asked   Social History Narrative     Not on file     This note was written with the assistance of the Dragon voice-dictation technology software. The final document, although reviewed, may contain errors. For corrections, please contact the office.

## 2020-09-04 ENCOUNTER — TELEPHONE (OUTPATIENT)
Dept: CARDIOLOGY | Facility: CLINIC | Age: 55
End: 2020-09-04

## 2020-09-04 NOTE — TELEPHONE ENCOUNTER
Patient called to report wt increase of 4lbs overnight (currently taking lasix 20mg daily) Wt. on 9/3/20 was 204lbs and this morning it is 208lbs . Patient's last document wt on 8/28/20 was 206lbs. Patient reports continued SOB with activity (I.e. walking around the store). Patient denies any increase in edema. RN advised patient that CALVIN Jes Miranda is non in clinic today so RN will send to Dr. Aguilar for review and recommendation.       8/12/20 OV note CALVIN Jes Ruben  Assessment/Plan:  1. HFpEF, with recent admission for the same  2. Deconditioning   - Continue low dose Lasix.   - Sleep med referral.   - Routine exercise.      Follow-up: 6 months with Dr. Aguilar + BMP, proBNP.      This was changed to a telephone visit due to technical issues on the patient's end.   Phone time: 20 minutes     Jes Miranda PA-C  [facility]  Pager: [phone]  Text Page  (7:30am - 4pm M-F)     7/30/20 tele encounter Dr. Aguilar  Yes, she should start lasix 20 MG daily. Can we arrange a virtual visit with Jes or myself in 2 weeks Thanks. Can she give her a prescription for the pill form of KCL?

## 2020-09-04 NOTE — TELEPHONE ENCOUNTER
RN called patient and reviewed with her Dr. Aguilar's recommendations. Patient verbalized understanding and is in agreement with plan. RN will send reminder to check in with patient on Tuesday.

## 2020-09-04 NOTE — TELEPHONE ENCOUNTER
Lets do 20 MG BID of lasix for the next 4 days and have her call us on Tuesday. If things are better then she can go back to 20 MG daily. Thanks.

## 2020-09-08 ENCOUNTER — TELEPHONE (OUTPATIENT)
Dept: CARDIOLOGY | Facility: CLINIC | Age: 55
End: 2020-09-08

## 2020-09-08 DIAGNOSIS — I50.30 HEART FAILURE WITH PRESERVED EJECTION FRACTION, NYHA CLASS I (H): ICD-10-CM

## 2020-09-08 NOTE — TELEPHONE ENCOUNTER
Call from patient, she states she used the lasix 20mg BID over the weekend as directed. This did remove the 4# she gained overnight last week. She states she feels back at baseline. Reviewed with patient Dr. Aguilar's recommendation for returning to lasix 20mg daily if she felt better on Tuesday.  Patient states she will go back to lasix 20mg daily but wants to discuss further if she needs to increase her lasix dose somehow. She states this is the 2nd or 3rd time she has had spikes of fluid overload and is not convinced that the current dose is enough.  Patient was to see Dr. Aguilar next February.    Will message CALVIN Jes Miranda to review

## 2020-09-09 RX ORDER — FUROSEMIDE 20 MG
20 TABLET ORAL DAILY
Qty: 45 TABLET | Refills: 3 | Status: SHIPPED | OUTPATIENT
Start: 2020-09-09 | End: 2020-09-30

## 2020-09-09 NOTE — TELEPHONE ENCOUNTER
Let's do a sliding scale regimen - Lasix 20 mg daily with an extra 20 mg for a 2+ lb wt gain until wt returns to baseline.

## 2020-09-09 NOTE — TELEPHONE ENCOUNTER
Spoke with patient to review recommendation to use lasix 20mg daily and add prn lasix 20mg on days when she has a 2lb weight gain and continue until back to baseline. Patient states she would like to have monthly script sent for 45 pills to cover up to 3-4 days/week if needed. Rx escripted.

## 2020-09-10 NOTE — NURSING NOTE
Patient's information has been sent to Insomnia Team to call pt to schedule CBTI appointment.  PSG has been scheduled.    RAMY Franklin

## 2020-09-25 ENCOUNTER — CARE COORDINATION (OUTPATIENT)
Dept: CARDIOLOGY | Facility: CLINIC | Age: 55
End: 2020-09-25

## 2020-09-25 NOTE — PROGRESS NOTES
Called and spoke with pt. Discussed that Dr. Aguilar recommends increasing lasix dose to 20mg twice a day for 5 days, then getting BMP lab and having a follow up visit with ROD Couch.  Pt verbalized understanding an agrees with this plan.  Attempted to schedule lab appt at [place] [facility], no appts available, so pt agrees to have BMP done at her [facility] in [place] on 9/29/20 AM (lab order faxed to: [phone]) and she agrees to video visit with ROD Couch on 9/30/20 at 7:30am.     CHRIS Fox 2:51 PM 9/25/2020

## 2020-09-25 NOTE — TELEPHONE ENCOUNTER
Lets increase lasix to 20 MG PO BID for the next 5 days and arrange a BMP and FUP with Jes next week thanks.

## 2020-09-25 NOTE — PROGRESS NOTES
Received voicemail from pt who reports that she is finding that she needs to use the PRN lasix 20mg dose for 2+ lbs weight gain (ROD Couch recommended on 9/9/20) every other day because on the days she does not use it her wt is usually up 4# the next day. She also reports she is noticing more short of breath with activity- she reports being extremely out of breath walking 300ft.     Called and spoke with pt. She denies any shortness of breath at rest today; however, she did notice that a little bit yesterday. She was able to sleep in her normal position last night- did not use extra pillows to prop up or sleep in a chair due to her breathing. Discussed with pt that will review with Dr. Aguilar, as ROD Couch is out of the office today, and will callback with recommendations- she agrees with this plan.    Most recent labs are from 8/4/20 in Care Everywhere:      CHRIS Fox 10:32 AM 9/25/2020

## 2020-09-29 ENCOUNTER — TRANSFERRED RECORDS (OUTPATIENT)
Dept: HEALTH INFORMATION MANAGEMENT | Facility: CLINIC | Age: 55
End: 2020-09-29

## 2020-09-29 LAB
ANION GAP SERPL CALCULATED.3IONS-SCNC: 15 MMOL/L (ref 7–16)
BUN SERPL-MCNC: 6 MG/DL (ref 7–26)
CALCIUM SERPL-MCNC: 9.2 MG/DL (ref 8.4–10.4)
CHLORIDE SERPLBLD-SCNC: 103 MMOL/L (ref 98–109)
CO2 SERPL-SCNC: 26 MMOL/L (ref 20–29)
CREAT SERPL-MCNC: 0.93 MG/DL (ref 0.55–1.02)
GFR SERPL CREATININE-BSD FRML MDRD: >60 ML/MIN/1.73M2
GLUCOSE SERPL-MCNC: 89 MG/DL (ref 70–100)
POTASSIUM SERPL-SCNC: 4 MMOL/L (ref 3.5–5.1)
SODIUM SERPL-SCNC: 144 MMOL/L (ref 136–145)

## 2020-09-29 NOTE — PROGRESS NOTES
Received BMP results from [facility] that were drawn today in preparation for pt's video visit with ROD Couch tomorrow. Results entered into epic and sent to maya.    CHRIS Fox 4:33 PM 9/29/2020

## 2020-09-30 ENCOUNTER — TELEPHONE (OUTPATIENT)
Dept: CARDIOLOGY | Facility: CLINIC | Age: 55
End: 2020-09-30

## 2020-09-30 ENCOUNTER — VIRTUAL VISIT (OUTPATIENT)
Dept: CARDIOLOGY | Facility: CLINIC | Age: 55
End: 2020-09-30
Payer: COMMERCIAL

## 2020-09-30 VITALS — BODY MASS INDEX: 36.14 KG/M2 | HEIGHT: 63 IN | WEIGHT: 204 LBS

## 2020-09-30 DIAGNOSIS — I50.33 ACUTE ON CHRONIC HEART FAILURE WITH PRESERVED EJECTION FRACTION (HFPEF) (H): Primary | ICD-10-CM

## 2020-09-30 DIAGNOSIS — I50.30 HEART FAILURE WITH PRESERVED EJECTION FRACTION, NYHA CLASS I (H): ICD-10-CM

## 2020-09-30 DIAGNOSIS — E78.2 MIXED HYPERLIPIDEMIA: ICD-10-CM

## 2020-09-30 PROCEDURE — 99213 OFFICE O/P EST LOW 20 MIN: CPT | Mod: 95 | Performed by: PHYSICIAN ASSISTANT

## 2020-09-30 RX ORDER — LURASIDONE HYDROCHLORIDE 60 MG/1
80 TABLET, FILM COATED ORAL
COMMUNITY

## 2020-09-30 RX ORDER — FUROSEMIDE 40 MG
40 TABLET ORAL DAILY
Qty: 90 TABLET | Refills: 3 | Status: SHIPPED | OUTPATIENT
Start: 2020-09-30 | End: 2020-12-18

## 2020-09-30 RX ORDER — BUSPIRONE HYDROCHLORIDE 10 MG/1
TABLET ORAL
COMMUNITY
Start: 2020-09-25 | End: 2020-10-16

## 2020-09-30 RX ORDER — TRAMADOL HYDROCHLORIDE 50 MG/1
TABLET ORAL
COMMUNITY
Start: 2020-09-14 | End: 2021-01-25

## 2020-09-30 ASSESSMENT — MIFFLIN-ST. JEOR: SCORE: 1484.47

## 2020-09-30 NOTE — TELEPHONE ENCOUNTER
----- Message from Jes Miranda PA-C sent at 9/30/2020  7:59 AM CDT -----  Abraham Watson. I had a visit with Radha this morning - she's feeling better on the higher dose of Lasix so I'm going to keep her there. Can you let her know that I'm sending a prescription for the higher dose (40 mg daily) to her pharmacy? Can you also ask her to purchase an [employer] BP cuff while she's there so that she can keep an eye on her BP intermittently? Thanks!

## 2020-09-30 NOTE — PROGRESS NOTES
"Radha Gomez is a 55 year old female who is being evaluated via a billable video visit.      The patient has been notified of following:     \"This video visit will be conducted via a call between you and your physician/provider. We have found that certain health care needs can be provided without the need for an in-person physical exam.  This service lets us provide the care you need with a video conversation.  If a prescription is necessary we can send it directly to your pharmacy.  If lab work is needed we can place an order for that and you can then stop by our lab to have the test done at a later time.    Video visits are billed at different rates depending on your insurance coverage.  Please reach out to your insurance provider with any questions.    If during the course of the call the physician/provider feels a video visit is not appropriate, you will not be charged for this service.\"    Patient has given verbal consent for Video visit? Yes  How would you like to obtain your AVS? MyChart  If you are dropped from the video visit, the video invite should be resent to: Send to e-mail at: [email or web address]  Will anyone else be joining your video visit? No      Review Of Systems  Skin: NEGATIVE  Eyes:Ears/Nose/Throat: wears glasses, tinnitus  Respiratory: SOB with activity  Cardiovascular:chest pain, palpitations when lays down, fatigue, dizziness  Gastrointestinal: nausea, vomiting  Genitourinary:NEGATIVE  Musculoskeletal: arthritis  Neurologic: numbness in hands  Psychiatric: treated depression  Hematologic/Lymphatic/Immunologic: NEGATIVE  Endocrine:  Diabetes Type II  Vitals - Patient Reported  Weight (Patient Reported): 92.5 kg (204 lb)    RAMY Olivares    Telephone number of patient: [phone]    Video-Visit Details    Type of service:  Video Visit AM WELL MY CHART    Video Start Time: 7:35 AM  Video End Time: 7:58 AM    Originating Location (pt. Location): Home    Distant Location (provider " location):  [facility]     Platform used for Video Visit: [employer] MY CHART    I have reviewed and updated the patient's Past Medical History, Social History, Family History and Medication List.    PROBLEM LIST  Patient Active Problem List   Diagnosis     Major depressive disorder, recurrent episode, severe (H)     Palpitations     Anxiety     PTSD (post-traumatic stress disorder)     Bipolar 1 disorder (H)     Interstitial cystitis     Coronary artery disease     HLD (hyperlipidemia)     Chronic pain -- on Methadone     DM 2 -- Hgb A1C 6.0 on 5/29/19     Sleep-related breathing disorder     Night terrors, adult     Disorder of initiating and maintaining sleep       ALLERGIES  Abilify [aripiprazole]; Paxil [paroxetine]; Contrast dye; Ditropan [oxybutynin chloride]; Ibuprofen; and Wellbutrin [bupropion]    MEDICATIONS  Current Outpatient Medications   Medication Sig Dispense Refill     ALPRAZolam (XANAX) 0.25 MG tablet Take 1.5 mg by mouth daily as needed for anxiety 0.25 mg x 6 tablets       aspirin 81 MG EC tablet Take 1 tablet (81 mg) by mouth daily 90 tablet 3     busPIRone (BUSPAR) 10 MG tablet TAKE 1 TABLET BY MOUTH TWICE DAILY ALONG WITH THE 30MG TABLET       busPIRone HCl (BUSPAR) 30 MG tablet Take by mouth 2 times daily        dulaglutide (TRULICITY) 0.75 MG/0.5ML pen Inject 0.75 mg Subcutaneous every 7 days       furosemide (LASIX) 20 MG tablet Take 1 tablet (20 mg) by mouth daily And use additional 20mg once/day if weight gain >2 pounds (Patient taking differently: Take 40 mg by mouth daily And use additional 20mg once/day if weight gain >2 pounds) 45 tablet 3     lurasidone (LATUDA) 60 MG TABS tablet Take by mouth daily with food       metFORMIN (GLUCOPHAGE) 1000 MG tablet Take 1,000 mg by mouth 2 times daily (with meals)       methadone (DOLOPHINE) 10 MG tablet Take 10 mg by mouth 2 times daily       phenazopyridine (PYRIDIUM) 200 MG tablet Take 200 mg by mouth 3 times  daily as needed for irritation       prazosin (MINIPRESS) 5 MG capsule Take 5 mg by mouth every morning       prazosin (MINIPRESS) 5 MG capsule Take 10 mg by mouth At Bedtime       propranolol ER (INDERAL LA) 80 MG 24 hr capsule Take 80 mg by mouth daily       QUEtiapine (SEROQUEL) 300 MG tablet Take 300 mg by mouth every evening       traMADol (ULTRAM) 50 MG tablet Take 1-2 Tablets by mouth every 8 hours as needed for Pain for up to 28 days.       venlafaxine (EFFEXOR-XR) 150 MG 24 hr capsule Take 300 mg by mouth daily       omeprazole (PRILOSEC) 20 MG DR capsule Take 20 mg by mouth daily OR ZANTAC         Radha Gomez presents for assessment of dyspnea.    HPI:  In brief, Radha was recently admitted with exertional dyspnea after weeks of nausea and vomiting. BNP elevated at 27,000. Interstitial and groundglass infiltrates were noted on CT. TTE showed moderate RV dysfunction. LVEF was preserved. V/Q scan was negative. It was felt her respiratory failure may be related to stress CMP and she was diuresed gently with significant improvement in her symptoms. Her hospital admission wt on 7/25 was 219#; hospital discharge wt on 7/29 was 213#. Her stay was complicated by presenting APOLLO and transaminitis which was felt secondary to NSAID use. Her creatinine on presentation was 4.1, and improved to 0.8 on discharge. It was also complicated by a troponin rise, suspect type II. She had no chest pain and had a normal cath in 2016.    She had a cardiac MRI on 8/11 to follow up on her RV dysfunction noted as inpatient. This was quite normal and showed no evidence of RV dysfunction:  Normal biventricular size and systolic function.   Quantitative LVEF 72%. Quantitative RVEF 66%.  Delayed hyperenhancement reveals no evidence of scar or infiltrative disease.  Compared to recent echocardiogram, RV size and function is now normal.     She was kept on low-dose Lasix at 20 mg daily.    Recently, Radha called with complaint of  progressive GUSMAN and weight gain. She was experiencing GUSMAN with walking 300 feet or pushing during toileting. No orthopnea. No leg swelling or abdominal bloating. She was instructed to increase her Lasix from 20 daily to BID. She has noticed significant improvement with this. Weight varies widely on a daily basis, but overall, has come down from 213 lbs to 204 lbs. She is quite sure that her scale is accurate. She denies any increased fluid or sodium intake. She admits that she's been suffering from more anxiety than usual lately, and that her sleep has also been more disrupted. She has an upcoming sleep study. She notes an intermittent sharp L-sided chest discomfort that lasts for seconds in duration, on and off, but denies positional or exertional discomfort.     Assessment/Plan:  1. Acute on chronic HFpEF, symptoms improved with Lasix up-titration. Renal function is tolerating. Unclear trigger for exacerbation. Recent cardiac MRI unremarkable.  2. Obesity  3. Atypical chest pain   4. Probable SETH - has sleep study on Oct 6th.    - Continue Lasix 40 mg daily.   - Follow up with me in two weeks with labs (BMP, proBNP, Hgb) the day prior.  - In the meantime, I have asked her to purchase an [employer] home BP cuff for intermittent monitoring.    Jes Miranda PA-C  [facility]  Pager: [phone]  Text Page  (7:30am - 4pm M-F)

## 2020-09-30 NOTE — LETTER
"9/30/2020    [facility] [address]  [facility] [address]    RE: Radha Gomez       Dear Colleague,    I had the pleasure of seeing Radha Gomez in the [facility].    Radha Gomez is a 55 year old female who is being evaluated via a billable video visit.      The patient has been notified of following:     \"This video visit will be conducted via a call between you and your physician/provider. We have found that certain health care needs can be provided without the need for an in-person physical exam.  This service lets us provide the care you need with a video conversation.  If a prescription is necessary we can send it directly to your pharmacy.  If lab work is needed we can place an order for that and you can then stop by our lab to have the test done at a later time.    Video visits are billed at different rates depending on your insurance coverage.  Please reach out to your insurance provider with any questions.    If during the course of the call the physician/provider feels a video visit is not appropriate, you will not be charged for this service.\"    Patient has given verbal consent for Video visit? Yes  How would you like to obtain your AVS? MyChart  If you are dropped from the video visit, the video invite should be resent to: Send to e-mail at: [email or web address]  Will anyone else be joining your video visit? No      Review Of Systems  Skin: NEGATIVE  Eyes:Ears/Nose/Throat: wears glasses, tinnitus  Respiratory: SOB with activity  Cardiovascular:chest pain, palpitations when lays down, fatigue, dizziness  Gastrointestinal: nausea, vomiting  Genitourinary:NEGATIVE  Musculoskeletal: arthritis  Neurologic: numbness in hands  Psychiatric: treated depression  Hematologic/Lymphatic/Immunologic: NEGATIVE  Endocrine:  Diabetes Type II  Vitals - Patient Reported  Weight (Patient Reported): 92.5 kg (204 lb)    Angelique Aguayo, " [facility]    Telephone number of patient: [phone]    Video-Visit Details    Type of service:  Video Visit AM WELL MY CHART    Video Start Time: 7:35 AM  Video End Time: 7:58 AM    Originating Location (pt. Location): Home    Distant Location (provider location):  [facility]     Platform used for Video Visit: AmWell MY CHART    I have reviewed and updated the patient's Past Medical History, Social History, Family History and Medication List.    PROBLEM LIST  Patient Active Problem List   Diagnosis     Major depressive disorder, recurrent episode, severe (H)     Palpitations     Anxiety     PTSD (post-traumatic stress disorder)     Bipolar 1 disorder (H)     Interstitial cystitis     Coronary artery disease     HLD (hyperlipidemia)     Chronic pain -- on Methadone     DM 2 -- Hgb A1C 6.0 on 5/29/19     Sleep-related breathing disorder     Night terrors, adult     Disorder of initiating and maintaining sleep       ALLERGIES  Abilify [aripiprazole]; Paxil [paroxetine]; Contrast dye; Ditropan [oxybutynin chloride]; Ibuprofen; and Wellbutrin [bupropion]    MEDICATIONS  Current Outpatient Medications   Medication Sig Dispense Refill     ALPRAZolam (XANAX) 0.25 MG tablet Take 1.5 mg by mouth daily as needed for anxiety 0.25 mg x 6 tablets       aspirin 81 MG EC tablet Take 1 tablet (81 mg) by mouth daily 90 tablet 3     busPIRone (BUSPAR) 10 MG tablet TAKE 1 TABLET BY MOUTH TWICE DAILY ALONG WITH THE 30MG TABLET       busPIRone HCl (BUSPAR) 30 MG tablet Take by mouth 2 times daily        dulaglutide (TRULICITY) 0.75 MG/0.5ML pen Inject 0.75 mg Subcutaneous every 7 days       furosemide (LASIX) 20 MG tablet Take 1 tablet (20 mg) by mouth daily And use additional 20mg once/day if weight gain >2 pounds (Patient taking differently: Take 40 mg by mouth daily And use additional 20mg once/day if weight gain >2 pounds) 45 tablet 3     lurasidone (LATUDA) 60 MG TABS tablet Take by mouth daily with  food       metFORMIN (GLUCOPHAGE) 1000 MG tablet Take 1,000 mg by mouth 2 times daily (with meals)       methadone (DOLOPHINE) 10 MG tablet Take 10 mg by mouth 2 times daily       phenazopyridine (PYRIDIUM) 200 MG tablet Take 200 mg by mouth 3 times daily as needed for irritation       prazosin (MINIPRESS) 5 MG capsule Take 5 mg by mouth every morning       prazosin (MINIPRESS) 5 MG capsule Take 10 mg by mouth At Bedtime       propranolol ER (INDERAL LA) 80 MG 24 hr capsule Take 80 mg by mouth daily       QUEtiapine (SEROQUEL) 300 MG tablet Take 300 mg by mouth every evening       traMADol (ULTRAM) 50 MG tablet Take 1-2 Tablets by mouth every 8 hours as needed for Pain for up to 28 days.       venlafaxine (EFFEXOR-XR) 150 MG 24 hr capsule Take 300 mg by mouth daily       omeprazole (PRILOSEC) 20 MG DR capsule Take 20 mg by mouth daily OR ZANTAC         Radha Gomez presents for assessment of dyspnea.    HPI:  In brief, Radha was recently admitted with exertional dyspnea after weeks of nausea and vomiting. BNP elevated at 27,000. Interstitial and groundglass infiltrates were noted on CT. TTE showed moderate RV dysfunction. LVEF was preserved. V/Q scan was negative. It was felt her respiratory failure may be related to stress CMP and she was diuresed gently with significant improvement in her symptoms. Her hospital admission wt on 7/25 was 219#; hospital discharge wt on 7/29 was 213#. Her stay was complicated by presenting APOLLO and transaminitis which was felt secondary to NSAID use. Her creatinine on presentation was 4.1, and improved to 0.8 on discharge. It was also complicated by a troponin rise, suspect type II. She had no chest pain and had a normal cath in 2016.    She had a cardiac MRI on 8/11 to follow up on her RV dysfunction noted as inpatient. This was quite normal and showed no evidence of RV dysfunction:  Normal biventricular size and systolic function.   Quantitative LVEF 72%. Quantitative RVEF  66%.  Delayed hyperenhancement reveals no evidence of scar or infiltrative disease.  Compared to recent echocardiogram, RV size and function is now normal.     She was kept on low-dose Lasix at 20 mg daily.    Recently, Radha called with complaint of progressive GUSMAN and weight gain. She was experiencing GUSMAN with walking 300 feet or pushing during toileting. No orthopnea. No leg swelling or abdominal bloating. She was instructed to increase her Lasix from 20 daily to BID. She has noticed significant improvement with this. Weight varies widely on a daily basis, but overall, has come down from 213 lbs to 204 lbs. She is quite sure that her scale is accurate. She denies any increased fluid or sodium intake. She admits that she's been suffering from more anxiety than usual lately, and that her sleep has also been more disrupted. She has an upcoming sleep study. She notes an intermittent sharp L-sided chest discomfort that lasts for seconds in duration, on and off, but denies positional or exertional discomfort.     Assessment/Plan:  1. Acute on chronic HFpEF, symptoms improved with Lasix up-titration. Renal function is tolerating. Unclear trigger for exacerbation. Recent cardiac MRI unremarkable.  2. Obesity  3. Atypical chest pain   4. Probable SETH - has sleep study on Oct 6th.    - Continue Lasix 40 mg daily.   - Follow up with me in two weeks with labs (BMP, proBNP, Hgb) the day prior.  - In the meantime, I have asked her to purchase an [employer] home BP cuff for intermittent monitoring.    Jes Miranda PA-C  [facility] - [facility]  Pager: [phone]  Text Page  (7:30am - 4pm M-F)       Thank you for allowing me to participate in the care of your patient.    Sincerely,     Jes Miranda PA-C     [facility]

## 2020-09-30 NOTE — TELEPHONE ENCOUNTER
Called and spoke with pt.  Discussed that ROD Couch recommends she continue on lasix 40mg daily and a Rx for 40mg tabs has been sent to her [facility] pharmacy in [place]. Also discussed that ROD Couch recommends she buy a [employer] BP cuff to check her BP intermittently at home. Pt states she already has a home BP cuff and agrees to start checking her BP intermittently.  Reviewed with pt what to watch for with her BP and when to call.  Pt also inquired about getting scheduled for 2 week follow up and labs that ROD Couch had discussed. Pt agrees to get non-fasting labs done at her [facility] in [place] on 10/14/20 and a virtual visit with ORD Couch on 10/16/20 at 3:50pm.     Lab order for Hgb, BMP and NTproBNP faxed to [facility] in [place] (fx: [phone]).     CHRIS Fox 9:23 AM 9/30/2020

## 2020-10-06 ENCOUNTER — THERAPY VISIT (OUTPATIENT)
Dept: SLEEP MEDICINE | Facility: CLINIC | Age: 55
End: 2020-10-06
Payer: COMMERCIAL

## 2020-10-06 DIAGNOSIS — F51.4 NIGHT TERRORS, ADULT: ICD-10-CM

## 2020-10-06 DIAGNOSIS — G47.30 SLEEP-RELATED BREATHING DISORDER: ICD-10-CM

## 2020-10-06 PROCEDURE — 95810 POLYSOM 6/> YRS 4/> PARAM: CPT | Performed by: PSYCHIATRY & NEUROLOGY

## 2020-10-07 NOTE — PROCEDURES
" SLEEP STUDY INTERPRETATION  DIAGNOSTIC POLYSOMNOGRAPHY REPORT      Patient: RONNIE PACHECO  YOB: 1965  Study Date: 10/6/2020  MRN: [id number]  Referring Provider: -  Ordering Provider: MD Valdez Matthew    Indications for Polysomnography: The patient is a 55 year old Female who is 5' 3\" and weighs 206.0 lbs. Her BMI is 36.5, [place] sleepiness scale 3 and neck circumference is 43 cm. Relevant medical history includes obesity, sleep related hypoxemia. A diagnostic polysomnogram was performed to evaluate for sleep apnea/PLMS/RLS/RBD/S-S/CSA/hypoventilation/hypoxemia.    Polysomnogram Data: A full night polysomnogram recorded the standard physiologic parameters including EEG, EOG, EMG, ECG, nasal and oral airflow. Respiratory parameters of chest and abdominal movements were recorded with respiratory inductance plethysmography. Oxygen saturation was recorded by pulse oximetry. Hypopnea scoring rule used: 1B 4%.    Sleep Architecture: Sleep fragmentation  The total recording time of the polysomnogram was 493.5 minutes. The total sleep time was 476.0 minutes. Sleep latency was 2.0 minutes. REM latency was 205.5 minutes. Arousal index was 45.4 arousals per hour. Sleep efficiency was 96.5%. Wake after sleep onset was 15.5 minutes. The patient spent 2.8% of total sleep time in Stage N1, 76.5% in Stage N2, 0.0% in Stage N3, and 20.7% in REM. Time in REM supine was 49.0 minutes.    Respiration: Sleep disordered breathing best characterized as moderate hypoxemia without hypoventilation and mild SETH.  Hypoxemia improved with supplemental O2.    Events ? The polysomnogram revealed a presence of 2 obstructive, 6 central, and - mixed apneas resulting in an apnea index of 1.0 events per hour. There were 57 obstructive hypopneas and - central hypopneas resulting in an obstructive hypopnea index of 7.2 and central hypopnea index of - events per hour. The combined apnea/hypopnea index was 8.2 events per hour (central " apnea/hypopnea index was 0.8 events per hour). The REM AHI was 9.7 events per hour. The supine AHI was 7.3 events per hour. The RERA index was 4.0 events per hour.  The RDI was 12.2 events per hour.    Snoring - was reported as loud.    Respiratory rate and pattern - was notable for normal respiratory rate and pattern.    Sustained Sleep Associated Hypoventilation - Transcutaneous carbon dioxide monitoring was used, however significant hypoventilation was not present with a maximum change from 38.0 to 43.2 mmHg and 0 minutes at or greater than 55 mmHg.    Sleep Associated Hypoxemia - (Greater than 5 minutes O2 sat at or below 88%) was present. Baseline oxygen saturation was 88.3%. Lowest oxygen saturation was 76.1%. Time spent less than or equal to 88% was 179.2 minutes. Time spent less than or equal to 89% was 286.1 minutes.    Movement Activity: Increased transient REM motor activity    Periodic Limb Activity - There were 15 PLMs during the entire study. The PLM index was 1.9 movements per hour. The PLM Arousal Index was 0.8 per hour.    REM EMG Activity - Excessive transient muscle activity was present.    Nocturnal Behavior - Abnormal sleep related behaviors were not clearly noted.    Bruxism - None apparent.    Cardiac Summary: Sinus (limited 1 lead EKG study)  The average pulse rate was 67.3 bpm. The minimum pulse rate was 54.0 bpm while the maximum pulse rate was 80.0 bpm.      Assessment:     Sleep disordered breathing best characterized as moderate hypoxemia without hypoventilation and mild SETH. Hypoxemia improved with supplemental O2.     Increased transient REM motor activity    Recommendations:    Recommend supplemental O2 2-3LPM via nasal cannula.     If deemed clinically relevant Mild SETH can be treated with the following options:  o Dental Appliance  o Auto CPAP  o Upper Airway Surgery  o Position restriction device (such as a ZZOMA) to prevent the patient from sleeping supine    Recommend evaluation  by pulmonary medicine to explore etiology of hypoxemia in absence of hypoventilation and only mild SETH.    Advice regarding the risks of drowsy driving.    Suggest optimizing sleep schedule and avoiding sleep deprivation.    Weight management     If patient has persistent dream enactment consider a diagnosis of REM sleep Behavior Disorder.    Diagnostic Codes: G47.33, G47.36, G47.9      Demetrius Chou MD 10-7-2020

## 2020-10-08 LAB — SLPCOMP: NORMAL

## 2020-10-09 ENCOUNTER — TELEPHONE (OUTPATIENT)
Dept: CARDIOLOGY | Facility: CLINIC | Age: 55
End: 2020-10-09

## 2020-10-09 DIAGNOSIS — I50.33 ACUTE ON CHRONIC HEART FAILURE WITH PRESERVED EJECTION FRACTION (HFPEF) (H): ICD-10-CM

## 2020-10-09 DIAGNOSIS — I25.10 CORONARY ARTERY DISEASE: Primary | ICD-10-CM

## 2020-10-14 ENCOUNTER — TRANSFERRED RECORDS (OUTPATIENT)
Dept: HEALTH INFORMATION MANAGEMENT | Facility: CLINIC | Age: 55
End: 2020-10-14

## 2020-10-14 LAB
ANION GAP SERPL CALCULATED.3IONS-SCNC: 12 MMOL/L
BUN SERPL-MCNC: 9 MG/DL
CALCIUM SERPL-MCNC: 9 MG/DL
CHLORIDE SERPLBLD-SCNC: 102 MMOL/L
CO2 SERPL-SCNC: 24 MMOL/L
CREAT SERPL-MCNC: 0.86 MG/DL
GFR SERPL CREATININE-BSD FRML MDRD: >60 ML/MIN/1.73M2
GLUCOSE SERPL-MCNC: 154 MG/DL (ref 70–99)
HEMOGLOBIN: 12.2 G/DL (ref 11.7–15.7)
POTASSIUM SERPL-SCNC: 4 MMOL/L
SODIUM SERPL-SCNC: 138 MMOL/L

## 2020-10-15 LAB — Lab: 107

## 2020-10-16 ENCOUNTER — TELEPHONE (OUTPATIENT)
Dept: CARDIOLOGY | Facility: CLINIC | Age: 55
End: 2020-10-16

## 2020-10-16 ENCOUNTER — VIRTUAL VISIT (OUTPATIENT)
Dept: CARDIOLOGY | Facility: CLINIC | Age: 55
End: 2020-10-16
Payer: COMMERCIAL

## 2020-10-16 VITALS — HEIGHT: 63 IN | BODY MASS INDEX: 35.97 KG/M2 | WEIGHT: 203 LBS

## 2020-10-16 DIAGNOSIS — I50.33 ACUTE ON CHRONIC HEART FAILURE WITH PRESERVED EJECTION FRACTION (HFPEF) (H): ICD-10-CM

## 2020-10-16 DIAGNOSIS — F41.9 ANXIETY: Primary | ICD-10-CM

## 2020-10-16 PROCEDURE — 99214 OFFICE O/P EST MOD 30 MIN: CPT | Mod: 95 | Performed by: PHYSICIAN ASSISTANT

## 2020-10-16 RX ORDER — PROPRANOLOL HCL 60 MG
60 CAPSULE, EXTENDED RELEASE 24HR ORAL DAILY
Qty: 90 CAPSULE | Refills: 3 | Status: SHIPPED | OUTPATIENT
Start: 2020-10-16

## 2020-10-16 ASSESSMENT — MIFFLIN-ST. JEOR: SCORE: 1471.99

## 2020-10-16 NOTE — PROGRESS NOTES
"Radha Gomez is a 55 year old female who is being evaluated via a billable video visit.      The patient has been notified of following:     \"This video visit will be conducted via a call between you and your physician/provider. We have found that certain health care needs can be provided without the need for an in-person physical exam.  This service lets us provide the care you need with a video conversation.  If a prescription is necessary we can send it directly to your pharmacy.  If lab work is needed we can place an order for that and you can then stop by our lab to have the test done at a later time.    Video visits are billed at different rates depending on your insurance coverage.  Please reach out to your insurance provider with any questions.    If during the course of the call the physician/provider feels a video visit is not appropriate, you will not be charged for this service.\"    Patient has given verbal consent for Video visit? Yes  How would you like to obtain your AVS? Mail a copy  If you are dropped from the video visit, the video invite should be resent to: Text to cell phone: [phone]  Will anyone else be joining your video visit? No      Vitals - Patient Reported 8/12/2020 9/30/2020 10/16/2020   Height (Patient Reported) 5' 3\" - 5' 2.5\"   Weight (Patient Reported) 205 lb 204 lb 203 lb   BMI (Based on Pt Reported Ht/Wt) 36.31 kg/m2 - 36.54 kg/m2   Systolic (Patient Reported) - - 85   Diastolic (Patient Reported) - - 57   Pulse (Patient Reported) - - 81       Review Of Systems  Skin: NEGATIVE  Eyes:Ears/Nose/Throat: Glasses  Respiratory: Sob  Cardiovascular:Dizziness  Gastrointestinal: NEGATIVE  Genitourinary:NEGATIVE   Musculoskeletal: Arthritis  Neurologic: Numbness and tingling in left arm  Psychiatric: NEGATIVE  Hematologic/Lymphatic/Immunologic: NEGATIVE  Endocrine: Diabetic    Ivonne MCCANN    Video-Visit Details    Type of service:  Video Visit    Video Start Time: 3:46 " PM  Video End Time: 4:03 PM    Originating Location (pt. Location): Home    Distant Location (provider location):  [facility] HEART [facility]     Platform used for Video Visit: Doximity  _________________________________  I have reviewed and updated the patient's Past Medical History, Social History, Family History and Medication List.    PROBLEM LIST  Patient Active Problem List   Diagnosis     Major depressive disorder, recurrent episode, severe (H)     Palpitations     Anxiety     PTSD (post-traumatic stress disorder)     Bipolar 1 disorder (H)     Interstitial cystitis     Coronary artery disease     HLD (hyperlipidemia)     Chronic pain -- on Methadone     DM 2 -- Hgb A1C 6.0 on 5/29/19     Sleep-related breathing disorder     Night terrors, adult     Disorder of initiating and maintaining sleep     Acute on chronic heart failure with preserved ejection fraction (HFpEF) (H)       ALLERGIES  Abilify [aripiprazole], Paxil [paroxetine], Contrast dye, Ditropan [oxybutynin chloride], Ibuprofen, and Wellbutrin [bupropion]    MEDICATIONS  Current Outpatient Medications   Medication Sig Dispense Refill     ALPRAZolam (XANAX) 0.25 MG tablet Take 1.5 mg by mouth daily as needed for anxiety 0.25 mg x 6 tablets       aspirin 81 MG EC tablet Take 1 tablet (81 mg) by mouth daily 90 tablet 3     busPIRone HCl (BUSPAR) 30 MG tablet Take by mouth 2 times daily        dulaglutide (TRULICITY) 0.75 MG/0.5ML pen Inject 0.75 mg Subcutaneous every 7 days       furosemide (LASIX) 40 MG tablet Take 1 tablet (40 mg) by mouth daily 90 tablet 3     lurasidone (LATUDA) 60 MG TABS tablet Take by mouth daily with food       metFORMIN (GLUCOPHAGE) 1000 MG tablet Take 1,000 mg by mouth 2 times daily (with meals)       methadone (DOLOPHINE) 10 MG tablet Take 10 mg by mouth 2 times daily       phenazopyridine (PYRIDIUM) 200 MG tablet Take 200 mg by mouth 3 times daily as needed for irritation       prazosin (MINIPRESS) 5 MG capsule  Take 5 mg by mouth every morning       prazosin (MINIPRESS) 5 MG capsule Take 10 mg by mouth At Bedtime       propranolol ER (INDERAL LA) 80 MG 24 hr capsule Take 80 mg by mouth daily       QUEtiapine (SEROQUEL) 300 MG tablet Take 300 mg by mouth every evening       traMADol (ULTRAM) 50 MG tablet Take 1-2 Tablets by mouth every 8 hours as needed for Pain for up to 28 days.       venlafaxine (EFFEXOR-XR) 150 MG 24 hr capsule Take 300 mg by mouth daily       busPIRone (BUSPAR) 10 MG tablet TAKE 1 TABLET BY MOUTH TWICE DAILY ALONG WITH THE 30MG TABLET       omeprazole (PRILOSEC) 20 MG DR capsule Take 20 mg by mouth daily OR ZANTAC         Radha Gomez presents for reassessment of dyspnea.    HPI:  In brief, Radha was recently admitted with exertional dyspnea after weeks of nausea and vomiting. BNP elevated at 27,000. Interstitial and groundglass infiltrates were noted on CT. TTE showed moderate RV dysfunction. LVEF was preserved. V/Q scan was negative. It was felt her respiratory failure may be related to stress CMP and she was diuresed gently with significant improvement in her symptoms. Her hospital admission wt on 7/25 was 219#; hospital discharge wt on 7/29 was 213#. Her stay was complicated by presenting APOLLO and transaminitis which was felt secondary to NSAID use. Her creatinine on presentation was 4.1, and improved to 0.8 on discharge. It was also complicated by a troponin rise, suspect type II. She had no chest pain and had a normal cath in 2016.    She had a cardiac MRI on 8/11 to follow up on her RV dysfunction noted as inpatient. This was quite normal and showed no evidence of RV dysfunction:  Normal biventricular size and systolic function.   Quantitative LVEF 72%. Quantitative RVEF 66%.  Delayed hyperenhancement reveals no evidence of scar or infiltrative disease.  Compared to recent echocardiogram, RV size and function is now normal.     She was kept on low-dose Lasix at 20 mg daily.    I last spoke  with Radha end of September, after she called clinic with complaint of progressive GUSMAN and weight gain. She was experiencing GUSMAN with walking 300 feet or pushing during toileting.  She had no other associated symptoms that were suggestive of heart failure. She was instructed to increase her Lasix from 20 daily to BID and noticed significant improvement with this. Weight had declined from 213 --> 204 lbs. her renal function was tolerating this nicely.  No clear triggers, except that she'd been suffering from more anxiety than usual lately, and that her sleep has also been more disrupted. She had an upcoming sleep study.  I offered reassurance, kept her medications the same, and asked her to obtain a blood pressure cuff for monitoring at home.    Today, she feels okay. Her dyspnea is better and Her weight has stabilized right around 204 lbs. She's been increasing her activity level recently, and she notes exertional lightheadedness with this. She also has headaches, which are present on waking in the morning. Her renal function, BNP, and hemoglobin are all within normal limits. However her blood pressure is running on the low side, 80's-90's systolic. She is on prazosin for night terrors and propranolol for anxiety/palpitations. She isn't sure if the propranolol really makes a difference.    She had her sleep study on October 6, and was found to have moderate hypoxemia without hypoventilation and just mild obstructive sleep apnea.  Nightly supplemental oxygen at 2 to 3 L/min was recommended.  Also, a referral to pulmonary medicine was suggested to further assess the etiology of her hypoxemia. She hasn't received those results from sleep med yet, so we reviewed them briefly today. She'll delve into this more at her upcoming visit with sleep med.     Assessment/Plan:  1. Acute on chronic HFpEF, stable. No changes today.   2. Hypotension, exertional lightheadedness - Reduce propranolol from 80 to 60 mg daily. RN call in  one week to assess BP. If still hypotensive, will start to wean minipress.  3. Nocturnal desats, without hypoventilation or significant SETH - sleep med recommendations are upcoming.     - Follow up: Dr. Lin in six months.     Jes Miranda PA-C  [facility]  Pager: [phone]  Text Page  (7:30am - 4pm M-F)

## 2020-10-16 NOTE — TELEPHONE ENCOUNTER
----- Message from Jes Miranda PA-C sent at 10/16/2020  4:09 PM CDT -----  Hi - Radha is hypotensive today. Can you call her in a week or so to see how her pressures and symptoms are doing on the lower dose of propranolol? Thanks!

## 2020-10-16 NOTE — PATIENT INSTRUCTIONS
Your BMI is Body mass index is 36.54 kg/m .  Weight management is a personal decision.  If you are interested in exploring weight loss strategies, the following discussion covers the approaches that may be successful. Body mass index (BMI) is one way to tell whether you are at a healthy weight, overweight, or obese. It measures your weight in relation to your height.  A BMI of 18.5 to 24.9 is in the healthy range. A person with a BMI of 25 to 29.9 is considered overweight, and someone with a BMI of 30 or greater is considered obese. More than two-thirds of American adults are considered overweight or obese.  Being overweight or obese increases the risk for further weight gain. Excess weight may lead to heart disease and diabetes.  Creating and following plans for healthy eating and physical activity may help you improve your health.  Weight control is part of healthy lifestyle and includes exercise, emotional health, and healthy eating habits. Careful eating habits lifelong are the mainstay of weight control. Though there are significant health benefits from weight loss, long-term weight loss with diet alone may be very difficult to achieve- studies show long-term success with dietary management in less than 10% of people. Attaining a healthy weight may be especially difficult to achieve in those with severe obesity. In some cases, medications, devices and surgical management might be considered.  What can you do?  If you are overweight or obese and are interested in methods for weight loss, you should discuss this with your provider.     Consider reducing daily calorie intake by 500 calories.     Keep a food journal.     Avoiding skipping meals, consider cutting portions instead.    Diet combined with exercise helps maintain muscle while optimizing fat loss. Strength training is particularly important for building and maintaining muscle mass. Exercise helps reduce stress, increase energy, and improves fitness.  Increasing exercise without diet control, however, may not burn enough calories to loose weight.       Start walking three days a week 10-20 minutes at a time    Work towards walking thirty minutes five days a week     Eventually, increase the speed of your walking for 1-2 minutes at time    In addition, we recommend that you review healthy lifestyles and methods for weight loss available through the National Institutes of Health patient information sites:  http://win.niddk.nih.gov/publications/index.htm    And look into health and wellness programs that may be available through your health insurance provider, employer, local community center, or josh club.    Weight management plan: Patient was referred to their PCP to discuss a diet and exercise plan.

## 2020-10-16 NOTE — LETTER
"10/16/2020    [facility] [place] [address]  [place] [address]    RE: Radha Gomez       Dear Colleague,    I had the pleasure of seeing Radha Gomez in the [facility].    Radha Gomez is a 55 year old female who is being evaluated via a billable video visit.      The patient has been notified of following:     \"This video visit will be conducted via a call between you and your physician/provider. We have found that certain health care needs can be provided without the need for an in-person physical exam.  This service lets us provide the care you need with a video conversation.  If a prescription is necessary we can send it directly to your pharmacy.  If lab work is needed we can place an order for that and you can then stop by our lab to have the test done at a later time.    Video visits are billed at different rates depending on your insurance coverage.  Please reach out to your insurance provider with any questions.    If during the course of the call the physician/provider feels a video visit is not appropriate, you will not be charged for this service.\"    Patient has given verbal consent for Video visit? Yes  How would you like to obtain your AVS? Mail a copy  If you are dropped from the video visit, the video invite should be resent to: Text to cell phone: [phone]  Will anyone else be joining your video visit? No      Vitals - Patient Reported 8/12/2020 9/30/2020 10/16/2020   Height (Patient Reported) 5' 3\" - 5' 2.5\"   Weight (Patient Reported) 205 lb 204 lb 203 lb   BMI (Based on Pt Reported Ht/Wt) 36.31 kg/m2 - 36.54 kg/m2   Systolic (Patient Reported) - - 85   Diastolic (Patient Reported) - - 57   Pulse (Patient Reported) - - 81       Review Of Systems  Skin: NEGATIVE  Eyes:Ears/Nose/Throat: Glasses  Respiratory: Sob  Cardiovascular:Dizziness  Gastrointestinal: NEGATIVE  Genitourinary:NEGATIVE   Musculoskeletal: " Arthritis  Neurologic: Numbness and tingling in left arm  Psychiatric: NEGATIVE  Hematologic/Lymphatic/Immunologic: NEGATIVE  Endocrine: Diabetic    Ivonne Yousif RMA    Video-Visit Details    Type of service:  Video Visit    Video Start Time: 3:46 PM  Video End Time: 4:03 PM    Originating Location (pt. Location): Home    Distant Location (provider location):  [facility]ART [facility]     Platform used for Video Visit: Doximity  _________________________________  I have reviewed and updated the patient's Past Medical History, Social History, Family History and Medication List.    PROBLEM LIST  Patient Active Problem List   Diagnosis     Major depressive disorder, recurrent episode, severe (H)     Palpitations     Anxiety     PTSD (post-traumatic stress disorder)     Bipolar 1 disorder (H)     Interstitial cystitis     Coronary artery disease     HLD (hyperlipidemia)     Chronic pain -- on Methadone     DM 2 -- Hgb A1C 6.0 on 5/29/19     Sleep-related breathing disorder     Night terrors, adult     Disorder of initiating and maintaining sleep     Acute on chronic heart failure with preserved ejection fraction (HFpEF) (H)       ALLERGIES  Abilify [aripiprazole], Paxil [paroxetine], Contrast dye, Ditropan [oxybutynin chloride], Ibuprofen, and Wellbutrin [bupropion]    MEDICATIONS  Current Outpatient Medications   Medication Sig Dispense Refill     ALPRAZolam (XANAX) 0.25 MG tablet Take 1.5 mg by mouth daily as needed for anxiety 0.25 mg x 6 tablets       aspirin 81 MG EC tablet Take 1 tablet (81 mg) by mouth daily 90 tablet 3     busPIRone HCl (BUSPAR) 30 MG tablet Take by mouth 2 times daily        dulaglutide (TRULICITY) 0.75 MG/0.5ML pen Inject 0.75 mg Subcutaneous every 7 days       furosemide (LASIX) 40 MG tablet Take 1 tablet (40 mg) by mouth daily 90 tablet 3     lurasidone (LATUDA) 60 MG TABS tablet Take by mouth daily with food       metFORMIN (GLUCOPHAGE) 1000 MG tablet Take 1,000 mg by mouth  2 times daily (with meals)       methadone (DOLOPHINE) 10 MG tablet Take 10 mg by mouth 2 times daily       phenazopyridine (PYRIDIUM) 200 MG tablet Take 200 mg by mouth 3 times daily as needed for irritation       prazosin (MINIPRESS) 5 MG capsule Take 5 mg by mouth every morning       prazosin (MINIPRESS) 5 MG capsule Take 10 mg by mouth At Bedtime       propranolol ER (INDERAL LA) 80 MG 24 hr capsule Take 80 mg by mouth daily       QUEtiapine (SEROQUEL) 300 MG tablet Take 300 mg by mouth every evening       traMADol (ULTRAM) 50 MG tablet Take 1-2 Tablets by mouth every 8 hours as needed for Pain for up to 28 days.       venlafaxine (EFFEXOR-XR) 150 MG 24 hr capsule Take 300 mg by mouth daily       busPIRone (BUSPAR) 10 MG tablet TAKE 1 TABLET BY MOUTH TWICE DAILY ALONG WITH THE 30MG TABLET       omeprazole (PRILOSEC) 20 MG DR capsule Take 20 mg by mouth daily OR ZANTAC         Radha Gomez presents for reassessment of dyspnea.    HPI:  In brief, Radha was recently admitted with exertional dyspnea after weeks of nausea and vomiting. BNP elevated at 27,000. Interstitial and groundglass infiltrates were noted on CT. TTE showed moderate RV dysfunction. LVEF was preserved. V/Q scan was negative. It was felt her respiratory failure may be related to stress CMP and she was diuresed gently with significant improvement in her symptoms. Her hospital admission wt on 7/25 was 219#; hospital discharge wt on 7/29 was 213#. Her stay was complicated by presenting APOLLO and transaminitis which was felt secondary to NSAID use. Her creatinine on presentation was 4.1, and improved to 0.8 on discharge. It was also complicated by a troponin rise, suspect type II. She had no chest pain and had a normal cath in 2016.    She had a cardiac MRI on 8/11 to follow up on her RV dysfunction noted as inpatient. This was quite normal and showed no evidence of RV dysfunction:  Normal biventricular size and systolic function.   Quantitative LVEF  72%. Quantitative RVEF 66%.  Delayed hyperenhancement reveals no evidence of scar or infiltrative disease.  Compared to recent echocardiogram, RV size and function is now normal.     She was kept on low-dose Lasix at 20 mg daily.    I last spoke with Radha end of September, after she called clinic with complaint of progressive GUSMAN and weight gain. She was experiencing GUSMAN with walking 300 feet or pushing during toileting.  She had no other associated symptoms that were suggestive of heart failure. She was instructed to increase her Lasix from 20 daily to BID and noticed significant improvement with this. Weight had declined from 213 --> 204 lbs. her renal function was tolerating this nicely.  No clear triggers, except that she'd been suffering from more anxiety than usual lately, and that her sleep has also been more disrupted. She had an upcoming sleep study.  I offered reassurance, kept her medications the same, and asked her to obtain a blood pressure cuff for monitoring at home.    Today, she feels okay. Her dyspnea is better and Her weight has stabilized right around 204 lbs. She's been increasing her activity level recently, and she notes exertional lightheadedness with this. She also has headaches, which are present on waking in the morning. Her renal function, BNP, and hemoglobin are all within normal limits. However her blood pressure is running on the low side, 80's-90's systolic. She is on prazosin for night terrors and propranolol for anxiety/palpitations. She isn't sure if the propranolol really makes a difference.    She had her sleep study on October 6, and was found to have moderate hypoxemia without hypoventilation and just mild obstructive sleep apnea.  Nightly supplemental oxygen at 2 to 3 L/min was recommended.  Also, a referral to pulmonary medicine was suggested to further assess the etiology of her hypoxemia. She hasn't received those results from sleep med yet, so we reviewed them briefly  today. She'll delve into this more at her upcoming visit with sleep med.     Assessment/Plan:  1. Acute on chronic HFpEF, stable. No changes today.   2. Hypotension, exertional lightheadedness - Reduce propranolol from 80 to 60 mg daily. RN call in one week to assess BP. If still hypotensive, will start to wean minipress.  3. Nocturnal desats, without hypoventilation or significant SETH - sleep med recommendations are upcoming.     - Follow up: Dr. Lin in six months.     Jes Miranda PA-C  [facility]  Pager: [phone]  Text Page  (7:30am - 4pm M-F)       Thank you for allowing me to participate in the care of your patient.    Sincerely,     Jes Miranda PA-C     [facility] [place]

## 2020-10-16 NOTE — TELEPHONE ENCOUNTER
Reminder sent to call pt on 10/23/20 for BP and symptom update.     CHRIS Fox 4:35 PM 10/16/2020

## 2020-10-16 NOTE — PROGRESS NOTES
"Radha Gomez is a 55 year old female who is being evaluated via a billable video visit.      The patient has been notified of following:     \"This video visit will be conducted via a call between you and your physician/provider. We have found that certain health care needs can be provided without the need for an in-person physical exam.  This service lets us provide the care you need with a video conversation.  If a prescription is necessary we can send it directly to your pharmacy.  If lab work is needed we can place an order for that and you can then stop by our lab to have the test done at a later time.    Video visits are billed at different rates depending on your insurance coverage.  Please reach out to your insurance provider with any questions.    If during the course of the call the physician/provider feels a video visit is not appropriate, you will not be charged for this service.\"    Patient has given verbal consent for Video visit? Yes  How would you like to obtain your AVS? MyChart  If you are dropped from the video visit, the video invite should be resent to: Text to cell phone: [phone]  Will anyone else be joining your video visit? No    Follow up sleep related hypoxemia, night patel.      PSG demonstrated hypoxemia with the relative paucity of SETH.      Increased REM motor activity in setting of history of nightmares and shouting (does not describe potentially injurious dream enactment.)      Denies having any trouble staying awake while driving.     Discussed results in detail.      Assessment/Plan  Sleep related hypoxemia in setting of only minimal SETH  RBD    We decided to go ahead and start supplemental O2 at 2LPM of supplemental O2 and check with RADHA.  I will also have her see my colleague Dr Gonzáles in Pulmonary Sleep medicine to assess the etiology of her hypoxemia which appears out of proportion to her mild ESTH.  We will hold off on PAP therapy for now. Will be curious regarding the " impression from Dr Gonzáles.     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  She is on serotonergic antidepressants, she indicates she smells fine.  She has constipation but only because of methadone.      She may be a good candidate for the NAPS study however would first like to address her hypoxemia and see if her shouting and vivid dreams get better with melatonin 3mg as well as coming down on her quetiapine and prazosin both of which are planned to come down.      We will watch for neurodegenerative disease when we do follow up visits in person.      Advised to never drive if tired or sleepy.       Video-Visit Details    Type of service:  Video Visit    Video Start Time: 0931  Video End Time: 1003    Originating Location (pt. Location): Home    Distant Location (provider location):  [facility] SLEEP [facility]     Platform used for Video Visit: Doximity (stopped and started a couple times)  Over all spent 25 minutes with patient.      Demetrius Chou MD

## 2020-10-19 ENCOUNTER — VIRTUAL VISIT (OUTPATIENT)
Dept: SLEEP MEDICINE | Facility: CLINIC | Age: 55
End: 2020-10-19
Payer: COMMERCIAL

## 2020-10-19 VITALS — WEIGHT: 203 LBS | HEIGHT: 63 IN | BODY MASS INDEX: 35.97 KG/M2

## 2020-10-19 DIAGNOSIS — E66.01 MORBID OBESITY (H): ICD-10-CM

## 2020-10-19 DIAGNOSIS — R09.02 HYPOXEMIA: Primary | ICD-10-CM

## 2020-10-19 PROCEDURE — 99214 OFFICE O/P EST MOD 30 MIN: CPT | Mod: 95 | Performed by: PSYCHIATRY & NEUROLOGY

## 2020-10-19 ASSESSMENT — MIFFLIN-ST. JEOR: SCORE: 1471.99

## 2020-10-19 NOTE — PROGRESS NOTES
"Radha Gomez is a 55 year old female who is being evaluated via a billable video visit.      The patient has been notified of following:     \"This video visit will be conducted via a call between you and your physician/provider. We have found that certain health care needs can be provided without the need for an in-person physical exam.  This service lets us provide the care you need with a video conversation.  If a prescription is necessary we can send it directly to your pharmacy.  If lab work is needed we can place an order for that and you can then stop by our lab to have the test done at a later time.    Video visits are billed at different rates depending on your insurance coverage.  Please reach out to your insurance provider with any questions.    If during the course of the call the physician/provider feels a video visit is not appropriate, you will not be charged for this service.\"    Patient has given verbal consent for Video visit? Yes  How would you like to obtain your AVS? Mail a copy  If you are dropped from the video visit, the video invite should be resent to: Text to cell phone: [phone]  Will anyone else be joining your video visit? No      Video-Visit Details    Type of service:  Video Visit    Video Start Time: 8:31 AM  Video End Time: 9:06 AM    Originating Location (pt. Location): Home    Distant Location (provider location):  [facility]     Platform used for Video Visit: Mike Che PsyD      SLEEP MEDICINE CONSULTATION  Sleep Psychology    Name: Radha Gomez MRN# [id number]   Age: 55 year old YOB: 1965     Date of Consultation: Oct 20, 2020  Consultation is requested by: No referring provider defined for this encounter.  Primary care provider: J Carlos Parsons    Reason for Sleep Consultation     Radha Gomez is a 55 year old female seen today for a behavioral sleep medicine consultation because of " insomnia.      Assessment and Plan     Sleep Diagnoses/Recommendations:    1. Chronic insomnia  Insomnia multifactored, associated with psychophysiologic features, cooccuring bipolar disorder, anxiety and PTSD.  Suitable candidate for modified CBTI focusing on stimulus control and counterconditioning.  Limit time in bed for sleep to 8 hours from 930 PM to 5:30 AM with alarm. She will sit up in bed when watching TV and get up, turn TV off, get ready for bed and turn the lights out ad prescribed sleep time.Follow with psychiatry for medication management.  2. RBD (REM behavioral disorder)  Follow-up with Dr. Demetrius Chou as needed.  She will initiate reocmmended melatonin as prescribed.      See patient instructions for initial treatment recommendations and behavioral sleep plan.    Summary Counseling:      Radha was provided information about the pathophysiology of insomnia and psychophysiological factors contributing to the onset and maintenance of insomnia .  Treatment option were discussed including component of cognitive-behavioral therapy for insomnia. The benefits and potential early side effects of treatment including increased daytime sleepiness were discussed. She was advised to seek medical advise and consultation around use of or changes to prescription sleep medication, Patient was counseled on the importance of avoiding driving if drowsy.        Follow-up: 4 weeks     History of Present Sleep Complaint     Radha Gomez is a 55 year old year old female who reports a longstanding history of insomnia associated with bipolar disorder, anxiety and PTSD.  Reports prior history of BPD but she disagrees with this diagnosis.  Sees psychiatrist, Quinten Sumner, and psychologist Michelle Mccray    Previous medications zolpidem, trazodone, amitriptyline    Reports significant limb movements, singing and other movements in sleep. Currently on Effexor for years.  Has very vivid dreams.    Currently goes to bed  "about 8 PM.  Has TV on and states he needs the TV on to calm his anxiety. She goes to bed at 7 PM and sits up in bed and watches TV until she wants to turn out lights and goe to bed.  Gets  Up between 430-6:30 AM  Average time in bed is over 10.5 hours. She devotes 9 hours to \"trying to sleep\".     No history of shift work.          Previous Sleep Studies:    PSG demonstrated hypoxemia with the relative paucity of SETH.       Increased REM motor activity in setting of history of nightmares and shouting (does not describe potentially injurious dream enactment.)       Denies having any trouble staying awake while driving.      Discussed results in detail.       Assessment/Plan  Sleep related hypoxemia in setting of only minimal SETH  RBD     We decided to go ahead and start supplemental O2 at 2LPM of supplemental O2 and check with RADHA.  I will also have her see my colleague Dr Gonzáles in Pulmonary Sleep medicine to assess the etiology of her hypoxemia which appears out of proportion to her mild SETH.  We will hold off on PAP therapy for now. Will be curious regarding the impression from Dr Gonzáles.      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  She is on serotonergic antidepressants, she indicates she smells fine.  She has constipation but only because of methadone.       She may be a good candidate for the NAPS study however would first like to address her hypoxemia and see if her shouting and vivid dreams get better with melatonin 3mg as well as coming down on her quetiapine and prazosin both of which are planned to come down.      Sleep Habits and Concerns:    Radha does use electronics in bed and watch TV in bed. The patient  does watch the clock at night and does not use an alarm.    Radha  does report worry about insomnia, concern about next day functioning, and feels she has " "lost control of her ability to sleep.  Radha  does report awakening feeling anxious, tense or worried. There does not appear to be significant effort spent trying to sleep.    Sleep Environment:  Radha reports her bedroom is quiet, comfortable and dark. The patient does have a regular bed partner and she  does not have pets in the bedroom.  snores    Sleep Behavior    Radha does not report pain or discomfort at night. There is not awakening due to heart pounding or racing.     Substance Use:    Tobacco: none reeported    Caffeine: 1-2.   Alcohol: none   Recreational Drugs: none reported       Screening          [place] Sleepiness Scale  Total score - [place]: 11 .    RAQUEL Total Score: 19     No flowsheet data found.    No flowsheet data found.    Patient Activation Score   No flowsheet data found.      Vitals     Ht 1.588 m (5' 2.5\")   Wt 92.1 kg (203 lb)   BMI 36.54 kg/m       Medical History     Patient Active Problem List   Diagnosis     Major depressive disorder, recurrent episode, severe (H)     Palpitations     Anxiety     PTSD (post-traumatic stress disorder)     Bipolar 1 disorder (H)     Interstitial cystitis     Coronary artery disease     HLD (hyperlipidemia)     Chronic pain -- on Methadone     DM 2 -- Hgb A1C 6.0 on 5/29/19     Sleep-related breathing disorder     Night terrors, adult     Disorder of initiating and maintaining sleep     Acute on chronic heart failure with preserved ejection fraction (HFpEF) (H)     Morbid obesity (H)        Current Outpatient Medications   Medication Sig Dispense Refill     ALPRAZolam (XANAX) 0.25 MG tablet Take 1.5 mg by mouth daily as needed for anxiety 0.25 mg x 6 tablets       aspirin 81 MG EC tablet Take 1 tablet (81 mg) by mouth daily 90 tablet 3     busPIRone HCl (BUSPAR) 30 MG tablet Take by mouth 2 times daily        dulaglutide (TRULICITY) 0.75 MG/0.5ML pen Inject 0.75 mg Subcutaneous every 7 days       furosemide (LASIX) 40 MG tablet Take 1 tablet (40 " mg) by mouth daily 90 tablet 3     lurasidone (LATUDA) 60 MG TABS tablet Take by mouth daily with food       metFORMIN (GLUCOPHAGE) 1000 MG tablet Take 1,000 mg by mouth 2 times daily (with meals)       methadone (DOLOPHINE) 10 MG tablet Take 10 mg by mouth 2 times daily       phenazopyridine (PYRIDIUM) 200 MG tablet Take 200 mg by mouth 3 times daily as needed for irritation       prazosin (MINIPRESS) 5 MG capsule Take 5 mg by mouth every morning       prazosin (MINIPRESS) 5 MG capsule Take 10 mg by mouth At Bedtime       propranolol ER (INDERAL LA) 60 MG 24 hr capsule Take 1 capsule (60 mg) by mouth daily 90 capsule 3     QUEtiapine (SEROQUEL) 300 MG tablet Take 300 mg by mouth every evening       traMADol (ULTRAM) 50 MG tablet Take 1-2 Tablets by mouth every 8 hours as needed for Pain for up to 28 days.       venlafaxine (EFFEXOR-XR) 150 MG 24 hr capsule Take 300 mg by mouth daily         Past Surgical History:   Procedure Laterality Date     ABDOMEN SURGERY      3 laproscopies     BIOPSY      bladder     COLONOSCOPY  age 47     ESOPHAGOSCOPY, GASTROSCOPY, DUODENOSCOPY (EGD), COMBINED  6/24/2013    Procedure: COMBINED ESOPHAGOSCOPY, GASTROSCOPY, DUODENOSCOPY (EGD), BIOPSY SINGLE OR MULTIPLE;  gastroscopy;  Surgeon: Nathalie Cotter MD;  Location:  GI     EXTRACTION(S) DENTAL N/A 8/30/2019    Procedure: EXTRACTION, TOOTH #30 AND #28;  Surgeon: Rachid Pérez DDS;  Location:  OR     GYN SURGERY      venereal warts removed     HC LEFT HEART CATHETERIZATION  4/15/16    non-obstructive CAD     INCISION AND DRAINAGE MANDIBLE, COMBINED N/A 8/30/2019    Procedure: INCISION AND DRAINAGE, MANDIBLE ABSCESS;  Surgeon: Rachid Pérez DDS;  Location:  OR     LAPAROSCOPIC CHOLECYSTECTOMY  6/25/2013    Procedure: LAPAROSCOPIC CHOLECYSTECTOMY;  LAPAROSCOPIC CHOLECYSTECTOMY.;  Surgeon: Salomón Wall MD;  Location:  OR     laporoscopy       ORTHOPEDIC SURGERY            Allergies   Allergen Reactions      Abilify [Aripiprazole] Cramps     Total body- couldn't walk     Paxil [Paroxetine] Other (See Comments)     Total body pain     Contrast Dye Swelling     Ditropan [Oxybutynin Chloride]      Can't Urinate, Per Pt.     Ibuprofen      Wellbutrin [Bupropion] Palpitations     If not XL         Psychiatric History     Prior Psychiatric Diagnoses: yes, Bipolar Disorder, Anxiety, reported prior diagnosis of BPD but disputes this diagnosist   Psychiatriccares: yes, Sees psychiatrist and psychologist   Use of Psychotropics yes, Effexor, quetiaqpine, alprazolam      Chemical Use     Prior Chemical Dependency Treatment: none         Family History     Family History   Problem Relation Age of Onset     Depression Mother      Anxiety Disorder Mother      Substance Abuse Mother      Dementia Mother      Mental Illness Mother         sexually abused by step father      Coronary Artery Disease Mother      Diabetes Mother      Depression Father      Substance Abuse Father      Mental Illness Father         [employer] vet- blamed others, angry     Heart Failure Father      Dementia Maternal Grandmother      Suicide Sister 36        OD     Mental Illness Sister         DID, neglected, abused and in foster homes     Emphysema Sister      Bipolar Disorder Son      Depression Son      Mental Illness Son         ODD     Suicide Daughter 15        attempted X2, OD and cutting; OD     Depression Daughter      Anxiety Disorder Daughter      Mental Illness Daughter 12        trichitillimania. PTSD     Depression Sister      Anxiety Disorder Sister      Chronic Obstructive Pulmonary Disease Sister        Sleep Disorders: none reported    Social History     Social History     Socioeconomic History     Marital status:      Spouse name: None     Number of children: None     Years of education: None     Highest education level: None   Occupational History     None   Social Needs     Financial resource strain: None     Food insecurity      Worry: None     Inability: None     Transportation needs     Medical: None     Non-medical: None   Tobacco Use     Smoking status: Current Every Day Smoker     Packs/day: 1.00     Types: Cigarettes     Start date: 1979     Smokeless tobacco: Never Used     Tobacco comment: QUIT FOR 5 YEARS   Substance and Sexual Activity     Alcohol use: Yes     Comment: 1 BEER PER DAY     Drug use: No     Sexual activity: Yes     Partners: Male     Birth control/protection: Surgical   Lifestyle     Physical activity     Days per week: None     Minutes per session: None     Stress: None   Relationships     Social connections     Talks on phone: None     Gets together: None     Attends [employer] service: None     Active member of club or organization: None     Attends meetings of clubs or organizations: None     Relationship status: None     Intimate partner violence     Fear of current or ex partner: None     Emotionally abused: None     Physically abused: None     Forced sexual activity: None   Other Topics Concern     Parent/sibling w/ CABG, MI or angioplasty before 65F 55M? Not Asked      Service Not Asked     Blood Transfusions Not Asked     Caffeine Concern No     Comment: minimal.      Occupational Exposure Not Asked     Hobby Hazards Not Asked     Sleep Concern Not Asked     Stress Concern Not Asked     Weight Concern Not Asked     Special Diet Not Asked     Back Care Not Asked     Exercise No     Bike Helmet Not Asked     Seat Belt Not Asked     Self-Exams Not Asked   Social History Narrative     None       , not working     Mental Status Examination     Radha presented as appropriately dressed and groomed and was oriented X3 with speech language intact.  The patient was cooperative throughout the evaluation with no signs of hallucinations, delusions, loosening of associations or other thought disturbance.  Mood was anxious.  Affect was congruent with mood. Insight and judgement we intact.  Memory was intact  for recent and remote elements.  There was no report of suicidal ideation, intention or plan. Attention and concentration were within normal.          Copy:   J Carlos Parsons               No referring provider defined for this encounter.    Demetrius Che PsyD, LP, [facility]  Diplomate, Behavioral Sleep Medicine  [facility] -  Karish[facility] Beltran and [facility]

## 2020-10-20 ENCOUNTER — VIRTUAL VISIT (OUTPATIENT)
Dept: SLEEP MEDICINE | Facility: CLINIC | Age: 55
End: 2020-10-20
Payer: COMMERCIAL

## 2020-10-20 DIAGNOSIS — F51.04 CHRONIC INSOMNIA: Primary | ICD-10-CM

## 2020-10-20 DIAGNOSIS — G47.52 RBD (REM BEHAVIORAL DISORDER): ICD-10-CM

## 2020-10-20 PROCEDURE — 90791 PSYCH DIAGNOSTIC EVALUATION: CPT | Mod: GT | Performed by: PSYCHOLOGIST

## 2020-10-23 NOTE — TELEPHONE ENCOUNTER
Called and spoke with pt.  She reports she just received the 60mg tabs of propranolol today. She confirms she has continued taking propranolol 80mg daily since her visit with ROD Couch on 11/16/20 and continues to have lower BP readings into the 80 systolic at times and does feel lightheaded/dizzy with that BP.  Discussed with pt that the propranolol 60mg daily should help with not having her BP drop so low and feeling lightheaded/dizzy as a results.  Reviewed with pt that will plan to call back in a week for an update on how she is doing on the propranolol 60mg daily- she verbalized understanding and agrees with this plan.    CHRIS Fox 5:08 PM 10/23/2020

## 2020-10-30 NOTE — TELEPHONE ENCOUNTER
RN called patient to inquire how she was feeling and obtain updated BP's after decreasing propanolol to 60mg daily. Patient reports continued dizziness/light headedness (same as before). Patient reports SOB is new with activity. Patient reports BP's recorded were still low (85/66 and 94/60). RN confirmed with patient she is taking 60mg of propanolol.    RN will send updates to CALVIN Jes Miranda for further review and recommendation.

## 2020-10-30 NOTE — TELEPHONE ENCOUNTER
Please have her stop the 5 mg AM dose of minipress. She should call us if her lightheadedness does not resolve with this in another week. Thank you!

## 2020-11-02 NOTE — TELEPHONE ENCOUNTER
Received voicemail from pt confirming she got message and understood the instructions to stop AM dose of minipress 5mg and will do that tomorrow.     CHRIS Fox 4:16 PM 11/2/2020

## 2020-11-02 NOTE — TELEPHONE ENCOUNTER
Called pt, no answer, left voicemail that ROD Couch recommends stopping 5mg dose of minipress (prazosin) in the mornings to see if that will help with her ongoing lightheadedness.  Requested a return call to confirm she received VM and understood instructions. Also discussed that will plan to call in a week for sx update.     CHRIS Fox 1:52 PM 11/2/2020

## 2020-11-08 ENCOUNTER — TELEPHONE (OUTPATIENT)
Dept: SLEEP MEDICINE | Facility: CLINIC | Age: 55
End: 2020-11-08

## 2020-11-08 DIAGNOSIS — G47.34 NOCTURNAL HYPOXEMIA: Primary | ICD-10-CM

## 2020-11-08 NOTE — TELEPHONE ENCOUNTER
----- Message from Omar Grayson sent at 11/6/2020  9:11 AM CST -----  Khanh Chou,  I received and oxygen referral for this patient. Could you please add frequency, route of administration and length of need to the RX, we should then be good to go to get the patient set up with the oxygen.   Any question feel free to call me at [phone]  Thank you,  Omar- DEEPTHI Liaison

## 2020-11-10 NOTE — TELEPHONE ENCOUNTER
Called pt for update on her lightheadedness/dizziness since stopping her AM dose of minipress on 11/3/20. Pt reports that her  has COVID and she is waiting to find out if she does too. She was tested on 11/8/20 and should find out the results in the next day or two.  She reports she continues to have shortness of breath, dizziness and headaches.     Discussed with pt that will update ROD Couch with this informations. Advised her to call if her COVID test is negative, because ROD Couch may want to make further adjustments to her medications if current symptoms are not being exacerbated by COVID. She agrees with this plan.     CHRIS Fox 4:05 PM 11/10/2020

## 2020-11-11 NOTE — TELEPHONE ENCOUNTER
Sorry to hear that -- sounds reasonable. Either way, she should probably follow up with her PCP for ongoing management. Thanks!

## 2020-11-11 NOTE — TELEPHONE ENCOUNTER
Reminder sent to call pt on 11/13/20 for an update and review recommendations from ROD Couch if have not heard back from pt yet.     CHRIS Fox 3:56 PM 11/11/2020

## 2020-11-13 NOTE — TELEPHONE ENCOUNTER
Late Entry:   11/12/20 at 2:00pm  Received voicemail from pt who reports she does not have COVID; however she is still feeling dizzy, having headaches and her BP is low- this AM BP was 90/47.     11/12/20 at 3:30pm   Called pt, no answer, left voicemail that ROD Couch recommends she follow up with her PCP for ongoing symptoms despite being COVID negative. Also advised pt to check her BP prior to her evening dose of Minipress and if BP still low and she is feeling dizzy, then do not take evening dose of Minipress and will review with ROD Couch tomorrow when she is back in the clinic for further recommendations.     CHRIS Fox 8:36 AM 11/13/2020

## 2020-11-13 NOTE — TELEPHONE ENCOUNTER
Called and spoke with pt. She reports she continued to have low blood pressures, dizziness, headaches and some shortness of breath with activity. Pt reports her wt is stable since she saw CYNTHIA Couch. Discussed that ROD Couch does not have any further medication recommendations at this time but does what her to follow up with her PCP for her ongoing symptoms. Pt verbalized understanding and agrees with this plan.    CHRIS Fox 4:04 PM 11/13/2020

## 2020-11-17 ENCOUNTER — VIRTUAL VISIT (OUTPATIENT)
Dept: SLEEP MEDICINE | Facility: CLINIC | Age: 55
End: 2020-11-17
Payer: COMMERCIAL

## 2020-11-17 VITALS — HEIGHT: 63 IN | BODY MASS INDEX: 35.44 KG/M2 | WEIGHT: 200 LBS

## 2020-11-17 DIAGNOSIS — F51.04 CHRONIC INSOMNIA: Primary | ICD-10-CM

## 2020-11-17 PROCEDURE — 90832 PSYTX W PT 30 MINUTES: CPT | Mod: GT | Performed by: PSYCHOLOGIST

## 2020-11-17 ASSESSMENT — MIFFLIN-ST. JEOR: SCORE: 1458.38

## 2020-11-17 NOTE — PROGRESS NOTES
"Radha Gomez is a 55 year old female who is being evaluated via a billable video visit.      The patient has been notified of following:     \"This video visit will be conducted via a call between you and your physician/provider. We have found that certain health care needs can be provided without the need for an in-person physical exam.  This service lets us provide the care you need with a video conversation.  If a prescription is necessary we can send it directly to your pharmacy.  If lab work is needed we can place an order for that and you can then stop by our lab to have the test done at a later time.    Video visits are billed at different rates depending on your insurance coverage.  Please reach out to your insurance provider with any questions.    If during the course of the call the physician/provider feels a video visit is not appropriate, you will not be charged for this service.\"    Patient has given verbal consent for Video visit? Yes  How would you like to obtain your AVS? MyChart  If you are dropped from the video visit, the video invite should be resent to: Text to cell phone: [phone]  Will anyone else be joining your video visit? No      Video-Visit Details    Type of service:  Video Visit    Video Start Time: 2:11 PM  Video End Time: 2:30 PM    Originating Location (pt. Location): Home    Distant Location (provider location):  [facility]     Platform used for Video Visit: nelson Benavides [facility]    SLEEP MEDICINE VIRTUAL VIDEO FOLLOW-UP VISIT  Sleep Psychology    Patient Name: Radha Gomez  MRN:  [id number]  Date of Service: Nov 17, 2020       Subjective Report     Radha Gomez  returns for a telehealth video visit to discuss progress in implementing behavioral strategies for the management of insomnia.  Patient consent for initiation of video visit was obtained and documented prior to initiation of visit.     Radha reports " "difficulty staying up to prescribed bedtime.  Has been going to bed 730 PM and then waking to 430 AM.    Psychiatrist prescribed prazosin. Has been experienced dizziness and spoke with cardiologist who took her off the medication and lowered the propranolol.    .Nightmares worse with stress.    Introduced imagery rehearsal strategies for nightmares.     .     Sleep Data:     Source of Sleep Estimates:  verbal self-report    Average Sleep Latency: 30 min.  Times Awakened: 3-4  Average Wake Time After Sleep Onset: >30 min.  Average Total Sleep Time:   approx 6-7 hrs  Sleep Efficiency: <85%%      .            Interventions     Strategies and recommendations including maintenance of stimulus control and imagery rehearsal therapy technique were discussed today.       Vital Signs     Ht 1.588 m (5' 2.5\")   Wt 90.7 kg (200 lb)   BMI 36.00 kg/m       Mental Status     Appearance:  Well kept  Orientation:  X3  Mood:  Normal  Affect:  Congruent with mood  Speech/Language:  Normal  Thought Process: Intact  Associations:  Normal  Thought Content: Normal  Patient does not report any suicidal ideation, intention or plan.    Diagnostic Impressions and Plan     Chronic insomnia    Plan:  adjust sleep schedule  by one hour to 730 - 830 AM as not able to tolerate later schedule  and introduce Imagery Rehearsal Therapy for treatment of nightmares    Follow-up: 2 months      Demetrius Che PsyD, LP, DBSM  Diplomate, Behavioral Sleep Medicine  [facility]                          "

## 2020-11-17 NOTE — PATIENT INSTRUCTIONS
Your BMI is Body mass index is 36 kg/m .  Weight management is a personal decision.  If you are interested in exploring weight loss strategies, the following discussion covers the approaches that may be successful. Body mass index (BMI) is one way to tell whether you are at a healthy weight, overweight, or obese. It measures your weight in relation to your height.  A BMI of 18.5 to 24.9 is in the healthy range. A person with a BMI of 25 to 29.9 is considered overweight, and someone with a BMI of 30 or greater is considered obese. More than two-thirds of American adults are considered overweight or obese.  Being overweight or obese increases the risk for further weight gain. Excess weight may lead to heart disease and diabetes.  Creating and following plans for healthy eating and physical activity may help you improve your health.  Weight control is part of healthy lifestyle and includes exercise, emotional health, and healthy eating habits. Careful eating habits lifelong are the mainstay of weight control. Though there are significant health benefits from weight loss, long-term weight loss with diet alone may be very difficult to achieve- studies show long-term success with dietary management in less than 10% of people. Attaining a healthy weight may be especially difficult to achieve in those with severe obesity. In some cases, medications, devices and surgical management might be considered.  What can you do?  If you are overweight or obese and are interested in methods for weight loss, you should discuss this with your provider.     Consider reducing daily calorie intake by 500 calories.     Keep a food journal.     Avoiding skipping meals, consider cutting portions instead.    Diet combined with exercise helps maintain muscle while optimizing fat loss. Strength training is particularly important for building and maintaining muscle mass. Exercise helps reduce stress, increase energy, and improves fitness.  Increasing exercise without diet control, however, may not burn enough calories to loose weight.       Start walking three days a week 10-20 minutes at a time    Work towards walking thirty minutes five days a week     Eventually, increase the speed of your walking for 1-2 minutes at time    In addition, we recommend that you review healthy lifestyles and methods for weight loss available through the National Institutes of Health patient information sites:  http://win.niddk.nih.gov/publications/index.htm    And look into health and wellness programs that may be available through your health insurance provider, employer, local community center, or josh club.    Weight management plan: Patient was referred to their PCP to discuss a diet and exercise plan.

## 2020-11-24 VITALS — HEIGHT: 63 IN | WEIGHT: 200 LBS | BODY MASS INDEX: 35.44 KG/M2

## 2020-11-24 ASSESSMENT — MIFFLIN-ST. JEOR: SCORE: 1458.7

## 2020-11-24 NOTE — PATIENT INSTRUCTIONS
1. Increase your Melatonin to 9mg nightly before bed. This increase is to try to reduce your nighttime behaviors including yelling.  2. Follow-up Dr. Valdez in 4 weeks  3. We will place a referral for Pulmonology to evaluate your lungs in order to better explain your sleep-related low oxygen levels (hypoxemia).  4. Continue meeting with Dr. Che for insomnia and nightmare management. Although you may not be aware of benefits at this time, continued practice of the principles he teaches should be helpful.    Your BMI is Body mass index is 35.97 kg/m .  Weight management is a personal decision.  If you are interested in exploring weight loss strategies, the following discussion covers the approaches that may be successful. Body mass index (BMI) is one way to tell whether you are at a healthy weight, overweight, or obese. It measures your weight in relation to your height.  A BMI of 18.5 to 24.9 is in the healthy range. A person with a BMI of 25 to 29.9 is considered overweight, and someone with a BMI of 30 or greater is considered obese. More than two-thirds of American adults are considered overweight or obese.  Being overweight or obese increases the risk for further weight gain. Excess weight may lead to heart disease and diabetes.  Creating and following plans for healthy eating and physical activity may help you improve your health.  Weight control is part of healthy lifestyle and includes exercise, emotional health, and healthy eating habits. Careful eating habits lifelong are the mainstay of weight control. Though there are significant health benefits from weight loss, long-term weight loss with diet alone may be very difficult to achieve- studies show long-term success with dietary management in less than 10% of people. Attaining a healthy weight may be especially difficult to achieve in those with severe obesity. In some cases, medications, devices and surgical management might be considered.  What can  you do?  If you are overweight or obese and are interested in methods for weight loss, you should discuss this with your provider.     Consider reducing daily calorie intake by 500 calories.     Keep a food journal.     Avoiding skipping meals, consider cutting portions instead.    Diet combined with exercise helps maintain muscle while optimizing fat loss. Strength training is particularly important for building and maintaining muscle mass. Exercise helps reduce stress, increase energy, and improves fitness. Increasing exercise without diet control, however, may not burn enough calories to loose weight.       Start walking three days a week 10-20 minutes at a time    Work towards walking thirty minutes five days a week     Eventually, increase the speed of your walking for 1-2 minutes at time    In addition, we recommend that you review healthy lifestyles and methods for weight loss available through the National Institutes of Health patient information sites:  http://win.niddk.nih.gov/publications/index.htm    And look into health and wellness programs that may be available through your health insurance provider, employer, local community center, or josh club.    Weight management plan: Patient was referred to their PCP to discuss a diet and exercise plan.

## 2020-11-24 NOTE — PROGRESS NOTES
"Radha Gomez is a 55 year old female who is being evaluated via a billable telephone visit.      The patient has been notified of following:     \"This telephone visit will be conducted via a call between you and your physician/provider. We have found that certain health care needs can be provided without the need for a physical exam.  This service lets us provide the care you need with a short phone conversation.  If a prescription is necessary we can send it directly to your pharmacy.  If lab work is needed we can place an order for that and you can then stop by our lab to have the test done at a later time.    Telephone visits are billed at different rates depending on your insurance coverage. During this emergency period, for some insurers they may be billed the same as an in-person visit.  Please reach out to your insurance provider with any questions.    If during the course of the call the physician/provider feels a telephone visit is not appropriate, you will not be charged for this service.\"    Patient has given verbal consent for Telephone visit?  Yes    What phone number would you like to be contacted at? [phone]    How would you like to [facility] your [facility]? [facility]    Phone call duration: 25 minutes    Andrew Valdez MD  "

## 2020-11-30 ENCOUNTER — VIRTUAL VISIT (OUTPATIENT)
Dept: SLEEP MEDICINE | Facility: CLINIC | Age: 55
End: 2020-11-30

## 2020-11-30 DIAGNOSIS — G47.00 DISORDER OF INITIATING AND MAINTAINING SLEEP: ICD-10-CM

## 2020-11-30 DIAGNOSIS — F51.4 NIGHT TERRORS, ADULT: ICD-10-CM

## 2020-11-30 DIAGNOSIS — G47.34 NOCTURNAL HYPOXEMIA: Primary | ICD-10-CM

## 2020-11-30 NOTE — PROGRESS NOTES
"  [facility]  Patient Name: Radha Gomez  MRN: [id number]  : 1965  Date of Clinic Visit: 2020  Primary Care Provider: J Carlos Parsons    FOLLOW-UP FOR: Sleep-related hypoxemia, chronic insomnia, abnormal behaviors, and nightmares    INTERVAL HISTORY:  Radha Gomez is a 55 year old female presenting for follow-up for sleep-related hypoxemia, chronic insomnia, abnormal behaviors, and nightmares.    Breathing: she has the O2 at home but has difficulty remembering to put this on. She otherwise tolerates it just fine. She is still smoking a pack of cigarettes/day. Previously quit for ~5 years but started again, is not currently interested in quitting but she is interested in seeing a Pulmonologist to evaluate for intrinsic lung disease that could explain her nocturnal hypoxemia.    Insomnia: She has been seeing Dr. Che. She reports trying to keep a consistent bedtime but reports a range of 7PM-9PM. She typically wakes at 530AM. She's not sure if this is having any benefit though. She does have some nocturnal awakenings though, which isn't new. Stopped taking Prazosin and reduced Propranolol because of dizziness.    Nightmares: Has been trying image-rehearsal therapy (IRT) with minimal effects. She doesn't expect much because she's not having recurring dreams which is making rehearsal difficult. She mostly experiences dreams where she is \"rejected\" and says \"I've dealt with a lot of rejection in my life.\"    Regarding her sleep-related behaviors: On Melatonin 3mg and this is helping reduce remembering the dreams but she's still engaging in screaming at night. She wakes up without memory of dream content but with the emotional toll She does not move around but the screaming/yelling is disturbing and often her  will have to go to another room. These episodes are nightly and happen multiple times a night.      ASSESSMENT AND PLAN:  # Nocturnal behaviors   - Melatonin " "increase to 9mg   - follow-up in 4 weeks    # Nocturnal hypoxemia   - continue nocturnal O2 2LPM NC   - recommended an alarm at 7PM to remind her to wear the oxygen at night   - Pulmonology referral    # Insomnia   - continue CBT-I    # Nightmares   - continue IRT    Patient was seen and discussed with Dr. Chou.  Patient advised to never drive if tired or sleepy.    Andrew Valdez MD  Sleep Medicine Fellow  [facility] [place]    Sleep Medicine Staff Telephone Note    I discussed, but did not personally evaluate, the patient with the fellow today.  I agree with the assessment and plan as it was reported to me.     Demetrius Chou MD      PHYSICAL EXAMINATION:  Vitals: Ht 1.588 m (5' 2.52\")   Wt 90.7 kg (200 lb)   BMI 35.97 kg/m    General: NAD  Pulm: non-labored during conversation  Psych: cooperative, appropriate mood and affect  Neuro: alert, normal speech, normal language comprehension and expression, hearing intact to conversation    REVIEW OF SYSTEMS: 12-point RoS negative except as per HPI.    MEDICATIONS:  Current Outpatient Medications   Medication Sig Dispense Refill     ALPRAZolam (XANAX) 0.25 MG tablet Take 1.5 mg by mouth daily as needed for anxiety 0.25 mg x 6 tablets       aspirin 81 MG EC tablet Take 1 tablet (81 mg) by mouth daily 90 tablet 3     busPIRone HCl (BUSPAR) 30 MG tablet Take by mouth 2 times daily        dulaglutide (TRULICITY) 0.75 MG/0.5ML pen Inject 0.75 mg Subcutaneous every 7 days       furosemide (LASIX) 40 MG tablet Take 1 tablet (40 mg) by mouth daily 90 tablet 3     lurasidone (LATUDA) 60 MG TABS tablet Take by mouth daily with food       metFORMIN (GLUCOPHAGE) 1000 MG tablet Take 1,000 mg by mouth 2 times daily (with meals)       methadone (DOLOPHINE) 10 MG tablet Take 10 mg by mouth 2 times daily       phenazopyridine (PYRIDIUM) 200 MG tablet Take 200 mg by mouth 3 times daily as needed for irritation       propranolol ER (INDERAL LA) 60 MG 24 hr " capsule Take 1 capsule (60 mg) by mouth daily 90 capsule 3     QUEtiapine (SEROQUEL) 300 MG tablet Take 300 mg by mouth every evening       traMADol (ULTRAM) 50 MG tablet Take 1-2 Tablets by mouth every 8 hours as needed for Pain for up to 28 days.       venlafaxine (EFFEXOR-XR) 150 MG 24 hr capsule Take 300 mg by mouth daily       This note was written with the assistance of the Dragon voice-dictation technology software. The final document, although reviewed, may contain errors. For corrections, please contact the office.

## 2020-12-02 DIAGNOSIS — G47.34 NOCTURNAL HYPOXEMIA: Primary | ICD-10-CM

## 2020-12-02 NOTE — TELEPHONE ENCOUNTER
RECORDS RECEIVED FROM: internal    DATE RECEIVED: 12.11.20    NOTES STATUS DETAILS   OFFICE NOTE from referring provider Internal  Andrew Valdez MD   OFFICE NOTE from other specialist Internal  10.19.José Antonio ROSS   DISCHARGE SUMMARY from hospital na    DISCHARGE REPORT from the ER na    MEDICATION LIST Internal     IMAGING  (NEED IMAGES AND REPORTS)     CT SCAN Internal  7.25.20    CHEST XRAY (CXR) In process  scheduled 12.9.20    TESTS     PULMONARY FUNCTION TESTING (PFT) Scheduled  Scheduled 12.9.20        Action 12.2.20 sv   Action Taken Message sent to nurse pool to place PFT and CXR order

## 2020-12-09 ENCOUNTER — ANCILLARY PROCEDURE (OUTPATIENT)
Dept: GENERAL RADIOLOGY | Facility: CLINIC | Age: 55
End: 2020-12-09
Attending: INTERNAL MEDICINE
Payer: COMMERCIAL

## 2020-12-09 DIAGNOSIS — G47.34 NOCTURNAL HYPOXEMIA: ICD-10-CM

## 2020-12-09 LAB
DLCOUNC-%PRED-PRE: 63 %
DLCOUNC-PRE: 12.44 ML/MIN/MMHG
DLCOUNC-PRED: 19.52 ML/MIN/MMHG
ERV-%PRED-PRE: 166 %
ERV-PRE: 0.71 L
ERV-PRED: 0.42 L
EXPTIME-PRE: 6.12 SEC
FEF2575-%PRED-PRE: 122 %
FEF2575-PRE: 2.92 L/SEC
FEF2575-PRED: 2.37 L/SEC
FEFMAX-%PRED-PRE: 103 %
FEFMAX-PRE: 6.5 L/SEC
FEFMAX-PRED: 6.27 L/SEC
FEV1-%PRED-PRE: 96 %
FEV1-PRE: 2.39 L
FEV1FEV6-PRE: 84 %
FEV1FEV6-PRED: 81 %
FEV1FVC-PRE: 85 %
FEV1FVC-PRED: 80 %
FEV1SVC-PRE: 83 %
FEV1SVC-PRED: 78 %
FIFMAX-PRE: 3.63 L/SEC
FRCPLETH-%PRED-PRE: 93 %
FRCPLETH-PRE: 2.45 L
FRCPLETH-PRED: 2.61 L
FVC-%PRED-PRE: 90 %
FVC-PRE: 2.82 L
FVC-PRED: 3.12 L
IC-%PRED-PRE: 78 %
IC-PRE: 2.17 L
IC-PRED: 2.74 L
RVPLETH-%PRED-PRE: 98 %
RVPLETH-PRE: 1.75 L
RVPLETH-PRED: 1.77 L
TLCPLETH-%PRED-PRE: 98 %
TLCPLETH-PRE: 4.62 L
TLCPLETH-PRED: 4.69 L
VA-%PRED-PRE: 83 %
VA-PRE: 3.8 L
VC-%PRED-PRE: 90 %
VC-PRE: 2.87 L
VC-PRED: 3.16 L

## 2020-12-09 PROCEDURE — 94375 RESPIRATORY FLOW VOLUME LOOP: CPT | Performed by: INTERNAL MEDICINE

## 2020-12-09 PROCEDURE — 94729 DIFFUSING CAPACITY: CPT | Performed by: INTERNAL MEDICINE

## 2020-12-09 PROCEDURE — 71046 X-RAY EXAM CHEST 2 VIEWS: CPT | Performed by: RADIOLOGY

## 2020-12-09 PROCEDURE — 94726 PLETHYSMOGRAPHY LUNG VOLUMES: CPT | Performed by: INTERNAL MEDICINE

## 2020-12-14 ENCOUNTER — TELEPHONE (OUTPATIENT)
Dept: PULMONOLOGY | Facility: CLINIC | Age: 55
End: 2020-12-14

## 2020-12-14 NOTE — TELEPHONE ENCOUNTER
Left message with pt about scheduling pulmonary appt. We had to cancel pts appt with Dr. Romero last Friday. Provided my number to call back.

## 2020-12-18 ENCOUNTER — HOSPITAL ENCOUNTER (OUTPATIENT)
Dept: LAB | Facility: CLINIC | Age: 55
Discharge: HOME | End: 2020-12-18
Admitting: PHYSICIAN ASSISTANT
Payer: COMMERCIAL

## 2020-12-18 ENCOUNTER — VIRTUAL VISIT (OUTPATIENT)
Dept: CARDIOLOGY | Facility: CLINIC | Age: 55
End: 2020-12-18
Payer: COMMERCIAL

## 2020-12-18 ENCOUNTER — VIRTUAL VISIT (OUTPATIENT)
Dept: PULMONOLOGY | Facility: CLINIC | Age: 55
End: 2020-12-18
Attending: INTERNAL MEDICINE
Payer: COMMERCIAL

## 2020-12-18 ENCOUNTER — CARE COORDINATION (OUTPATIENT)
Dept: CARDIOLOGY | Facility: CLINIC | Age: 55
End: 2020-12-18

## 2020-12-18 DIAGNOSIS — R06.09 DYSPNEA ON EXERTION: ICD-10-CM

## 2020-12-18 DIAGNOSIS — I50.33 ACUTE ON CHRONIC HEART FAILURE WITH PRESERVED EJECTION FRACTION (HFPEF) (H): ICD-10-CM

## 2020-12-18 DIAGNOSIS — I27.20 PULMONARY HYPERTENSION (H): Primary | ICD-10-CM

## 2020-12-18 DIAGNOSIS — I50.33 ACUTE ON CHRONIC HEART FAILURE WITH PRESERVED EJECTION FRACTION (HFPEF) (H): Primary | ICD-10-CM

## 2020-12-18 DIAGNOSIS — I50.30 HEART FAILURE WITH PRESERVED EJECTION FRACTION, NYHA CLASS I (H): ICD-10-CM

## 2020-12-18 DIAGNOSIS — I27.20 PULMONARY HYPERTENSION (H): ICD-10-CM

## 2020-12-18 LAB
ALBUMIN SERPL-MCNC: 3.1 G/DL (ref 3.4–5)
ALP SERPL-CCNC: 183 U/L (ref 40–150)
ALT SERPL W P-5'-P-CCNC: 103 U/L (ref 0–50)
ANION GAP SERPL CALCULATED.3IONS-SCNC: 1 MMOL/L (ref 3–14)
AST SERPL W P-5'-P-CCNC: 192 U/L (ref 0–45)
BILIRUB SERPL-MCNC: 0.1 MG/DL (ref 0.2–1.3)
BUN SERPL-MCNC: 9 MG/DL (ref 7–30)
CALCIUM SERPL-MCNC: 7.9 MG/DL (ref 8.5–10.1)
CHLORIDE SERPL-SCNC: 110 MMOL/L (ref 94–109)
CK SERPL-CCNC: 73 U/L (ref 30–225)
CO2 SERPL-SCNC: 29 MMOL/L (ref 20–32)
CREAT SERPL-MCNC: 0.77 MG/DL (ref 0.52–1.04)
CRP SERPL-MCNC: 4.9 MG/L (ref 0–8)
ERYTHROCYTE [SEDIMENTATION RATE] IN BLOOD BY WESTERGREN METHOD: 24 MM/H (ref 0–30)
GFR SERPL CREATININE-BSD FRML MDRD: 86 ML/MIN/{1.73_M2}
GLUCOSE SERPL-MCNC: 109 MG/DL (ref 70–99)
NT-PROBNP SERPL-MCNC: 387 PG/ML (ref 0–125)
POTASSIUM SERPL-SCNC: 3.8 MMOL/L (ref 3.4–5.3)
PROT SERPL-MCNC: 6.9 G/DL (ref 6.8–8.8)
SODIUM SERPL-SCNC: 140 MMOL/L (ref 133–144)

## 2020-12-18 PROCEDURE — 86431 RHEUMATOID FACTOR QUANT: CPT | Performed by: PHYSICIAN ASSISTANT

## 2020-12-18 PROCEDURE — 82085 ASSAY OF ALDOLASE: CPT | Performed by: PHYSICIAN ASSISTANT

## 2020-12-18 PROCEDURE — 86039 ANTINUCLEAR ANTIBODIES (ANA): CPT | Performed by: PHYSICIAN ASSISTANT

## 2020-12-18 PROCEDURE — 82550 ASSAY OF CK (CPK): CPT | Performed by: PHYSICIAN ASSISTANT

## 2020-12-18 PROCEDURE — 99214 OFFICE O/P EST MOD 30 MIN: CPT | Mod: 95 | Performed by: PHYSICIAN ASSISTANT

## 2020-12-18 PROCEDURE — 36415 COLL VENOUS BLD VENIPUNCTURE: CPT | Performed by: PHYSICIAN ASSISTANT

## 2020-12-18 PROCEDURE — 85652 RBC SED RATE AUTOMATED: CPT | Performed by: PHYSICIAN ASSISTANT

## 2020-12-18 PROCEDURE — 86140 C-REACTIVE PROTEIN: CPT | Performed by: PHYSICIAN ASSISTANT

## 2020-12-18 PROCEDURE — 86200 CCP ANTIBODY: CPT | Performed by: PHYSICIAN ASSISTANT

## 2020-12-18 PROCEDURE — 80053 COMPREHEN METABOLIC PANEL: CPT | Performed by: PHYSICIAN ASSISTANT

## 2020-12-18 PROCEDURE — 83880 ASSAY OF NATRIURETIC PEPTIDE: CPT | Performed by: PHYSICIAN ASSISTANT

## 2020-12-18 PROCEDURE — 86235 NUCLEAR ANTIGEN ANTIBODY: CPT | Performed by: INTERNAL MEDICINE

## 2020-12-18 PROCEDURE — 86038 ANTINUCLEAR ANTIBODIES: CPT | Performed by: PHYSICIAN ASSISTANT

## 2020-12-18 PROCEDURE — 99205 OFFICE O/P NEW HI 60 MIN: CPT | Mod: 95 | Performed by: INTERNAL MEDICINE

## 2020-12-18 PROCEDURE — 86255 FLUORESCENT ANTIBODY SCREEN: CPT | Performed by: PHYSICIAN ASSISTANT

## 2020-12-18 RX ORDER — LAMOTRIGINE 25 MG/1
150 TABLET ORAL DAILY
COMMUNITY
Start: 2020-12-14

## 2020-12-18 RX ORDER — HYDROCODONE BITARTRATE AND ACETAMINOPHEN 5; 325 MG/1; MG/1
1-2 TABLET ORAL
COMMUNITY
Start: 2020-12-14 | End: 2021-01-06

## 2020-12-18 RX ORDER — FUROSEMIDE 20 MG
60 TABLET ORAL DAILY
Qty: 90 TABLET | Refills: 5 | Status: SHIPPED | OUTPATIENT
Start: 2020-12-18 | End: 2021-08-23

## 2020-12-18 RX ORDER — SPIRONOLACTONE 25 MG/1
25 TABLET ORAL DAILY
Qty: 30 TABLET | Refills: 5 | Status: SHIPPED | OUTPATIENT
Start: 2020-12-18 | End: 2021-06-15
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
## 2020-12-18 NOTE — PROGRESS NOTES
Received voicemail from pt who reports her  could come home from work and bring her into the clinic for the visit with ROD Couch today at 12:30pm if that is preferred.     Reviewed above with ROD Couch who recommends pt's visit stay as a video visit.    Called and updated pt with this information and she verbalized understanding and agrees with plan to keep video visit with ROD Couch at 12:30pm.    CHRIS Fox 11:42 AM 12/18/2020
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
## 2020-12-18 NOTE — PROGRESS NOTES
Reminder sent to call pt for wt update on 12/22/20.     Will route to ROD Couch to request plan for diuretic adjustment next week if needed.     CHRIS Fox 1:28 PM 12/18/2020
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
## 2020-12-19 LAB — ALDOLASE SERPL-CCNC: 14.6 U/L (ref 1.5–8.1)

## 2020-12-21 ENCOUNTER — CARE COORDINATION (OUTPATIENT)
Dept: CARDIOLOGY | Facility: CLINIC | Age: 55
End: 2020-12-21

## 2020-12-21 DIAGNOSIS — T50.8X5A ALLERGIC REACTION TO CONTRAST DYE: ICD-10-CM

## 2020-12-21 DIAGNOSIS — R06.00 DYSPNEA: Primary | ICD-10-CM

## 2020-12-21 LAB
ANA PAT SER IF-IMP: ABNORMAL
ANA SER QL IF: ABNORMAL
ANA TITR SER IF: ABNORMAL {TITER}
ANCA AB PATTERN SER IF-IMP: NORMAL
C-ANCA TITR SER IF: NORMAL {TITER}
ENA JO1 IGG SER-ACNC: <0.2 AI (ref 0–0.9)
ENA SCL70 IGG SER IA-ACNC: <0.2 AI (ref 0–0.9)
ENA SS-A IGG SER IA-ACNC: <0.2 AI (ref 0–0.9)
ENA SS-B IGG SER IA-ACNC: <0.2 AI (ref 0–0.9)
RHEUMATOID FACT SER NEPH-ACNC: <7 IU/ML (ref 0–20)

## 2020-12-21 RX ORDER — ATORVASTATIN CALCIUM 40 MG/1
40 TABLET, FILM COATED ORAL DAILY
COMMUNITY
End: 2021-01-06

## 2020-12-21 NOTE — PROGRESS NOTES
"Component      Latest Ref Rng & Units 10/4/2020 10/14/2020 12/18/2020   Sodium      133 - 144 mmol/L  138 140   Potassium      3.4 - 5.3 mmol/L  4.0 3.8   Chloride      94 - 109 mmol/L  102 110 (H)   Carbon Dioxide      20 - 32 mmol/L  24 29   Anion Gap      3 - 14 mmol/L  12 1 (L)   Glucose      70 - 99 mg/dL  154 (A) 109 (H)   Urea Nitrogen      7 - 30 mg/dL  9 9   Creatinine      0.52 - 1.04 mg/dL  0.86 0.77   GFR Estimate      >60 mL/min/1.73:m2  >60 86   GFR Estimate If Black      >60 mL/min/1.73:m2  >60 >90   Calcium      8.5 - 10.1 mg/dL  9.0 7.9 (L)   Bilirubin Total      0.2 - 1.3 mg/dL   0.1 (L)   Albumin      3.4 - 5.0 g/dL   3.1 (L)   Protein Total      6.8 - 8.8 g/dL   6.9   Alkaline Phosphatase      40 - 150 U/L   183 (H)   ALT      0 - 50 U/L   103 (H)   AST      0 - 45 U/L   192 (H)   Brain Natriuretic Peptide - Historical      high 107     N-Terminal Pro Bnp      0 - 125 pg/mL   387 (H)       Pt had BMP and NT-ProBNP done after her video visit with ROD oCuch on 12/18/20 and results are above for review along with most current lab results of the same labs done prior.     ROD Couch's note from 12/18/20 states:  \"-She will complete the pulmonology work-up as ordered.  I also think it is pertinent to repeat an echocardiogram at this time, as her MRI from August showed normalization of her prior RV dysfunction, but I would like to know if her pulmonary hypertension persists.  If so, I will refer her to Dr. Smith in our pulmonary hypertension clinic.  -In the meantime, I will have her increase furosemide from 40 to 60 mg daily, and also add spironolactone 25 mg daily to her regimen.   - CMP, proBNP today.   -She will follow up with myself or Dr. Smith in the next several weeks, and I will have my nurse touch base with her in the meantime to ensure her weight is trending down.  Goal weight is around 200-204 pounds.  - Advised to  battery for her BP cuff so we can monitor with the med " "changes.\"    Called and spoke with pt. She confirms she started lasix dose increase to 60mg daily on 12/18/20 and started spironolactone 25mg daily on 12/20/20. Her wt this AM is 205#, which is down from 209# on 12/18/20. She reports she want to the store yesterday and was still more short of breath than usually with walking from the front to the back of the store.     She inquired about her NT-proBNP lab being elevated. Explained what NT-proBNP lab value means and also discussed pt's other lab results, stable kidney function and electrolytes but elevated liver enzymes.     Pt reports she take 4 Norco tabs per day, which are prescribed by her pain medicine provider. She also reports that she takes atorvastatin 40mg daily, which is prescribed by her PCP- added this to her Epic med list.     Discussed with pt that will review all of the above information with ROD Couch and call back with recommendations- she agrees with this plan.       Reviewed all of the above information with ROD Couch who recommends pt:  -continue lasix 60mg daily and spironolactone 25mg daily  -get CT chest PE with contrast to rule out PE since pt continues to have ongoing shortness of breath  -hold Lipitor and follow up with her PCP to determine further work up/plan for elevated liver enzymes    Called and spoke with pt and reviewed ROD Couch's recommendations with her. She verbalized understanding and agrees with plan to continue diuretics at current doses.     She states she would like to do the CT chest with the CT that her pulmonologist has ordered if possible and would like to do them before the end of the year since she has met her deductible. She also confirms that she has had tongue swelling with contrast dye (many years ago during test for gallbladder) and her recent cMRI she took benadryle and valium prior to and did not have any reaction. Discussed with pt that will have to talk with CT tech and ROD Couch about the " contrast dye allergy and will callback tomorrow with plan.     Pt confirms she will hold Lipitor and follow up with her PCP for further work up/plan for the elevated liver enzymes.     CHRIS Fox 4:56 PM 12/21/2020

## 2020-12-22 ENCOUNTER — PRE VISIT (OUTPATIENT)
Dept: PULMONOLOGY | Facility: CLINIC | Age: 55
End: 2020-12-22

## 2020-12-22 RX ORDER — DIPHENHYDRAMINE HCL 50 MG
50 CAPSULE ORAL ONCE
Qty: 1 CAPSULE | Refills: 0 | Status: SHIPPED | OUTPATIENT
Start: 2020-12-24 | End: 2020-12-24

## 2020-12-22 RX ORDER — PREDNISONE 20 MG/1
TABLET ORAL
Qty: 6 TABLET | Refills: 0 | Status: SHIPPED | OUTPATIENT
Start: 2020-12-23 | End: 2020-12-24

## 2020-12-22 NOTE — PROGRESS NOTES
Called and spoke with CT tech who confirms that they do have a protocol in place for pt's with contrast dye allergy: prednisone 40mg PO 13hrs, 7hrs, and 1hr prior to the CT test as well as benadryl 25mg PO 1hr prior to the CT test.     Reviewed above information with ROD Couch who agrees with the prednisone dosing but recommends increasing the benadryl dose to 50mg PO 1hr prior to the CT test. ROD Couch requested that the protocol plan be reviewed with pt and then the CT to rule out PE and the DT ordered by pt's pulmonologist be scheduled to be done at the same time prior to 12/31/20, as pt had requested.     Called and reviewed above plan with pt who verbalized understanding and agrees with the plan.      Spoke with imaging scheduling and get pt scheduled for 12/24/20 at 11:00am with 10:45am check in at [facility] for both CTs that are ordered.     Called and reviewed above information with pt who verbalized understanding and agrees with this plan and will have her  drive her to/from CT test. Reviewed prednisone and benadryl plan again with pt and confirmed Rxs will be sent to pt's [facility] pharmacy in [place] on [address].     CHRIS Fox 11:54 AM 12/22/2020

## 2020-12-22 NOTE — PROGRESS NOTES
Received call from pt requesting to know if she will be prescribed valium prior to CTs like she was prior to the cMRI. Reviewed with pt that valium is not a standing order for CTs like it is for MRI because CTs are not so enclosed like MRIs are. She verbalized understanding and agrees with this plan.    CHRIS Fox 3:50 PM 12/22/2020

## 2020-12-23 ENCOUNTER — CARE COORDINATION (OUTPATIENT)
Dept: CARDIOLOGY | Facility: CLINIC | Age: 55
End: 2020-12-23

## 2020-12-23 LAB — CCP AB SER IA-ACNC: 1 U/ML

## 2020-12-23 NOTE — PROGRESS NOTES
Received call from hospital CT department today reqeusting to know details of pt's contrast dye allergy. Discussed that pt's reported reaction to contrast dye is tongue swelling and had spoke with CT tech yesterday who reviewed the pre-medication protocol that is used when pt had a contrast dye allergy and confirmed that pt was advised to take 40mg prednisone 13hr, 7hr and 1hr prior to test along with 50mg benadryl 1hr prior to test.   They reviewed this information with the hospital radiologist who does not recommend pt have CT with contrast dye tomorrow.    Reviewed all of the above information with ROD Couch, who also reviewed with Dr. Aguilar, and they recommend that pt not have the CT with contrast dye to rule out PE tomorrow if radiologist does not recommend it. ROD Couch states she will talk with pt about having right heart cath procedure to evaluate pulmonary pressures at her upcoming visit on 1/6/20.     Called and reviewed above information with pt. She verbalized understanding and agrees with plan to only have CT chest without contrast that her pulmonologist ordered and understands that she does not have to pre-medicate with prednisone or benedryl as a result. Also discussed that ROD Couch will review a right heart cath test to look at pulmonary pressures at her upcoming visit on 1/6/20- she verbalized understanding and agrees with this plan.    Cancelled CT for PE and updated radiologist, Dr. Hidalgo, with this information as well.     CHRIS Fox 4:21 PM 12/23/2020

## 2020-12-24 ENCOUNTER — HOSPITAL ENCOUNTER (OUTPATIENT)
Dept: CT IMAGING | Facility: CLINIC | Age: 55
Discharge: HOME | End: 2020-12-24
Attending: INTERNAL MEDICINE | Admitting: INTERNAL MEDICINE
Payer: COMMERCIAL

## 2020-12-24 ENCOUNTER — HOSPITAL ENCOUNTER (OUTPATIENT)
Dept: CARDIAC REHAB | Facility: CLINIC | Age: 55
End: 2020-12-24
Attending: INTERNAL MEDICINE
Payer: COMMERCIAL

## 2020-12-24 DIAGNOSIS — R06.09 DYSPNEA ON EXERTION: ICD-10-CM

## 2020-12-24 DIAGNOSIS — I27.20 PULMONARY HYPERTENSION (H): ICD-10-CM

## 2020-12-24 PROCEDURE — 999N000193 HC STATISTIC VISIT PULM REHAB: Performed by: REHABILITATION PRACTITIONER

## 2020-12-24 PROCEDURE — 94618 PULMONARY STRESS TESTING: CPT | Performed by: REHABILITATION PRACTITIONER

## 2020-12-24 PROCEDURE — 71250 CT THORAX DX C-: CPT

## 2020-12-31 ENCOUNTER — HOSPITAL ENCOUNTER (OUTPATIENT)
Dept: CARDIOLOGY | Facility: CLINIC | Age: 55
Discharge: HOME | End: 2020-12-31
Attending: PHYSICIAN ASSISTANT | Admitting: PHYSICIAN ASSISTANT
Payer: COMMERCIAL

## 2020-12-31 DIAGNOSIS — I50.30 HEART FAILURE WITH PRESERVED EJECTION FRACTION, NYHA CLASS II (H): ICD-10-CM

## 2020-12-31 DIAGNOSIS — I50.33 ACUTE ON CHRONIC HEART FAILURE WITH PRESERVED EJECTION FRACTION (HFPEF) (H): ICD-10-CM

## 2020-12-31 LAB
ANION GAP SERPL CALCULATED.3IONS-SCNC: 3 MMOL/L (ref 3–14)
BUN SERPL-MCNC: 16 MG/DL (ref 7–30)
CALCIUM SERPL-MCNC: 9.4 MG/DL (ref 8.5–10.1)
CHLORIDE SERPL-SCNC: 106 MMOL/L (ref 94–109)
CO2 SERPL-SCNC: 28 MMOL/L (ref 20–32)
CREAT SERPL-MCNC: 1.05 MG/DL (ref 0.52–1.04)
GFR SERPL CREATININE-BSD FRML MDRD: 59 ML/MIN/{1.73_M2}
GLUCOSE SERPL-MCNC: 55 MG/DL (ref 70–99)
NT-PROBNP SERPL-MCNC: 144 PG/ML (ref 0–125)
POTASSIUM SERPL-SCNC: 4.5 MMOL/L (ref 3.4–5.3)
SODIUM SERPL-SCNC: 137 MMOL/L (ref 133–144)

## 2020-12-31 PROCEDURE — 93306 TTE W/DOPPLER COMPLETE: CPT | Mod: 26 | Performed by: INTERNAL MEDICINE

## 2020-12-31 PROCEDURE — 80048 BASIC METABOLIC PNL TOTAL CA: CPT | Performed by: PHYSICIAN ASSISTANT

## 2020-12-31 PROCEDURE — 255N000002 HC RX 255 OP 636: Performed by: PHYSICIAN ASSISTANT

## 2020-12-31 PROCEDURE — 999N000208 ECHOCARDIOGRAM COMPLETE

## 2020-12-31 PROCEDURE — 83880 ASSAY OF NATRIURETIC PEPTIDE: CPT | Performed by: PHYSICIAN ASSISTANT

## 2020-12-31 PROCEDURE — 36415 COLL VENOUS BLD VENIPUNCTURE: CPT | Performed by: PHYSICIAN ASSISTANT

## 2020-12-31 RX ADMIN — HUMAN ALBUMIN MICROSPHERES AND PERFLUTREN 9 ML: 10; .22 INJECTION, SOLUTION INTRAVENOUS at 08:27

## 2021-01-06 ENCOUNTER — VIRTUAL VISIT (OUTPATIENT)
Dept: CARDIOLOGY | Facility: CLINIC | Age: 56
End: 2021-01-06
Payer: COMMERCIAL

## 2021-01-06 ENCOUNTER — TELEPHONE (OUTPATIENT)
Dept: PULMONOLOGY | Facility: CLINIC | Age: 56
End: 2021-01-06

## 2021-01-06 DIAGNOSIS — I50.33 ACUTE ON CHRONIC HEART FAILURE WITH PRESERVED EJECTION FRACTION (HFPEF) (H): ICD-10-CM

## 2021-01-06 DIAGNOSIS — I51.7 IDIOPATHIC RIGHT VENTRICULAR DILATION: Primary | ICD-10-CM

## 2021-01-06 PROCEDURE — 99215 OFFICE O/P EST HI 40 MIN: CPT | Mod: 95 | Performed by: PHYSICIAN ASSISTANT

## 2021-01-06 NOTE — TELEPHONE ENCOUNTER
[facility] Call Center    Phone Message    May a detailed message be left on voicemail: yes     Reason for Call: Other: Pt reports that she had a CT scan done about a month ago ordered by  and that her cardiologist said there were unusual findings from it. Pt is wondering if she needs an appointment or follow-up. Please call back to discuss if pt needs a follow-up.     Action Taken: Message routed to:  [facility] ([facility] pulmonary    Travel Screening: Not Applicable
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
## 2021-01-14 ENCOUNTER — HEALTH MAINTENANCE LETTER (OUTPATIENT)
Age: 56
End: 2021-01-14

## 2021-01-15 ENCOUNTER — VIRTUAL VISIT (OUTPATIENT)
Dept: PULMONOLOGY | Facility: CLINIC | Age: 56
End: 2021-01-15
Attending: INTERNAL MEDICINE
Payer: COMMERCIAL

## 2021-01-15 DIAGNOSIS — J84.112 IDIOPATHIC PULMONARY FIBROSIS (H): ICD-10-CM

## 2021-01-15 DIAGNOSIS — I27.20 PULMONARY HYPERTENSION (H): ICD-10-CM

## 2021-01-15 DIAGNOSIS — R74.8 ELEVATED ALDOLASE LEVEL: ICD-10-CM

## 2021-01-15 DIAGNOSIS — R91.8 PULMONARY NODULES: Primary | ICD-10-CM

## 2021-01-15 DIAGNOSIS — N30.10 INTERSTITIAL CYSTITIS: ICD-10-CM

## 2021-01-15 DIAGNOSIS — N18.31 STAGE 3A CHRONIC KIDNEY DISEASE (H): ICD-10-CM

## 2021-01-15 DIAGNOSIS — J47.9 BRONCHIECTASIS WITHOUT COMPLICATION (H): ICD-10-CM

## 2021-01-15 PROBLEM — N18.30 CHRONIC KIDNEY DISEASE, STAGE 3 (H): Status: ACTIVE | Noted: 2021-01-15

## 2021-01-15 PROCEDURE — 99215 OFFICE O/P EST HI 40 MIN: CPT | Mod: 95 | Performed by: INTERNAL MEDICINE

## 2021-01-15 NOTE — PROGRESS NOTES
"Radha is a 55 year old who is being evaluated via a billable video visit.      How would you like to obtain your AVS? MyChart  If the video visit is dropped, the invitation should be resent by: Text to cell phone: telephone visit   Will anyone else be joining your video visit? No      Video Start Time: 846    Radha Gomez is a 55 year old female who is being evaluated via a billable video visit.      The patient has been notified of following:     \"This video visit will be conducted via a call between you and your physician/provider. We have found that certain health care needs can be provided without the need for an in-person physical exam.  This service lets us provide the care you need with a video conversation.  If a prescription is necessary we can send it directly to your pharmacy.  If lab work is needed we can place an order for that and you can then stop by our lab to have the test done at a later time.    Video visits are billed at different rates depending on your insurance coverage.  Please reach out to your insurance provider with any questions.    If during the course of the call the physician/provider feels a video visit is not appropriate, you will not be charged for this service.\"    Patient has given verbal consent for Video visit? Yes  How would you like to obtain your AVS? MyChart  If you are dropped from the video visit, the video invite should be resent to: Text to cell phone: [phone]  Will anyone else be joining your video visit? No        Video-Visit Details    Type of service:  Video Visit    Video Start Time:846  Video End Time: 915      Originating Location (pt. Location): Home    Distant Location (provider location):  [facility]     Platform used for Video Visit: Jennifer Romero MD      Reason for Visit  Radha Gomez is a 55 year old year old female who is being seen for RECHECK (Follow up " )    Pulmonary HPI    The patient was seen and examined by Diogenes Romero MD     ID: Radha Gomez is a 55 year old female  past medical history notable for DM, HTN, CAD ([facility] 2016, non obstructive lesions), chronic pain/methadone from interstitial cystitis and complex anxiety and psych regiment who is referred to pulmonary for further evaluation of unexplained hypoxemia following complicated admission last year.      Initial work-up hinted at possible pulmonary vascular process given prior findings of RV dysfunction (though improved) and findings of isolated diffusion defect on recent PFTS (diff include bronchiolitis though no overt obstruction).     Pulm Prob List  1. 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 GGO, ? Mosaic attenuation on prior imaging  3. Isolate diffusion defect - suggestive of pulmonary vascular process  4. Right sided cardiac dysfunction  5. Exertional dyspnea  6. Tobacco abuse   7. Coronary artery disease  8. Deconditioning    Interim History  Further work-up was pursued with repeat HRCT which showed resolution of GGO no mosaic attenuation. Mild bronchiectasis/airways disease as well several sub CM pulm nodules and mild LAD.  Additional LL atelectasis(vs fibrosis) .  Lab worked demonstrated persistent LFT elevation as well as adolase elevation otherwise ILD panel negative (borderline DYLAN noted)    She continues to have persistent GUSMAN not worse but not improved. She continues to smoke but is planning on quitting in the near term. She has followed up with Cards and pending RHC to evaluate right sided pressures.  6MWT noted for desaturation 96%>90% on RA with decrease exercise capacity      ASSESSMENT :   1. Abnormal CT Chest - atelectasis vs fibrosis  2. Elevated adolase, LFTs, - raises spectre of possible rheumatologic process (polymyositis related)   3. Isolate diffusion defect -  pulmonary vascular versus fibrosis   4. Right sided cardiac  dysfunction  5. Exertional dyspnea  6. Tobacco abuse   7. Pulmonary nodules/LAD - stable on 5 month CT, pt high risk given tobacco habit  8. Bronchiectasis - suspect smoking related  9. Coronary artery disease      PLAN  - additional rheum blood work, repeat adolase, myositis panel  - ANCA, A1AT given fam history and elevated LFTs  - referral to rheum  - agree with RHC  - repeat CT chest 6 months for nodule follow-up   - FUP after RHC and rheum    A total of 29 in direct face to face over video .     iDogenes Romero MD

## 2021-01-15 NOTE — PATIENT INSTRUCTIONS
1.  Agree with Right heart cath - follow-up with Cardiology  2. Check bronchiectasis - blood work and repeat adolase   3. Follow-up CT in 6 months to follow nodules  4. Suspect atelectasis (or fibrosis) given most symptoms exacerbated at night though  5. Absolute need for smoking cessation - plan for tapered withdrawal as soon as possible

## 2021-01-15 NOTE — LETTER
"    1/15/2021         RE: Radha Gomez  [address]  [place] [address]        Dear Colleague,    Thank you for referring your patient, Radha Gomez, to the [facility] [place]. Please see a copy of my visit note below.    Radha is a 55 year old who is being evaluated via a billable video visit.      How would you like to obtain your AVS? MyChart  If the video visit is dropped, the invitation should be resent by: Text to cell phone: telephone visit   Will anyone else be joining your video visit? No      Video Start Time: 846    Radha Gomez is a 55 year old female who is being evaluated via a billable video visit.      The patient has been notified of following:     \"This video visit will be conducted via a call between you and your physician/provider. We have found that certain health care needs can be provided without the need for an in-person physical exam.  This service lets us provide the care you need with a video conversation.  If a prescription is necessary we can send it directly to your pharmacy.  If lab work is needed we can place an order for that and you can then stop by our lab to have the test done at a later time.    Video visits are billed at different rates depending on your insurance coverage.  Please reach out to your insurance provider with any questions.    If during the course of the call the physician/provider feels a video visit is not appropriate, you will not be charged for this service.\"    Patient has given verbal consent for Video visit? Yes  How would you like to obtain your AVS? MyChart  If you are dropped from the video visit, the video invite should be resent to: Text to cell phone: [phone]  Will anyone else be joining your video visit? No        Video-Visit Details    Type of service:  Video Visit    Video Start Time:846  Video End Time: 915      Originating Location (pt. Location): Home    Distant " Location (provider location):  [facility]     Platform used for Video Visit: Jennifer Romero MD      Reason for Visit  Radha Gomez is a 55 year old year old female who is being seen for RECHECK (Follow up )    Pulmonary HPI    The patient was seen and examined by Diogenes Romero MD     ID: Radha Gomez is a 55 year old female  past medical history notable for DM, HTN, CAD ([facility] 2016, non obstructive lesions), chronic pain/methadone from interstitial cystitis and complex anxiety and psych regiment who is referred to pulmonary for further evaluation of unexplained hypoxemia following complicated admission last year.      Initial work-up hinted at possible pulmonary vascular process given prior findings of RV dysfunction (though improved) and findings of isolated diffusion defect on recent PFTS (diff include bronchiolitis though no overt obstruction).     Pulm Prob List  1. 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 GGO, ? Mosaic attenuation on prior imaging  3. Isolate diffusion defect - suggestive of pulmonary vascular process  4. Right sided cardiac dysfunction  5. Exertional dyspnea  6. Tobacco abuse   7. Coronary artery disease  8. Deconditioning    Interim History  Further work-up was pursued with repeat HRCT which showed resolution of GGO no mosaic attenuation. Mild bronchiectasis/airways disease as well several sub CM pulm nodules and mild LAD.  Additional LL atelectasis(vs fibrosis) .  Lab worked demonstrated persistent LFT elevation as well as adolase elevation otherwise ILD panel negative (borderline DYLAN noted)    She continues to have persistent GUSMAN not worse but not improved. She continues to smoke but is planning on quitting in the near term. She has followed up with Cards and pending RHC to evaluate right sided pressures.  6MWT noted for desaturation 96%>90% on RA with  decrease exercise capacity      ASSESSMENT :   1. Abnormal CT Chest - atelectasis vs fibrosis  2. Elevated adolase, LFTs, - raises spectre of possible rheumatologic process (polymyositis related)   3. Isolate diffusion defect -  pulmonary vascular versus fibrosis   4. Right sided cardiac dysfunction  5. Exertional dyspnea  6. Tobacco abuse   7. Pulmonary nodules/LAD - stable on 5 month CT, pt high risk given tobacco habit  8. Bronchiectasis - suspect smoking related  9. Coronary artery disease      PLAN  - additional rheum blood work, repeat adolase, myositis panel  - ANCA, A1AT given fam history and elevated LFTs  - referral to rheum  - agree with RHC  - repeat CT chest 6 months for nodule follow-up   - FUP after RHC and rheum    A total of 29 in direct face to face over video .     Diogenes Romero MD

## 2021-01-22 ENCOUNTER — TELEPHONE (OUTPATIENT)
Dept: PULMONOLOGY | Facility: CLINIC | Age: 56
End: 2021-01-22

## 2021-01-22 ENCOUNTER — CARE COORDINATION (OUTPATIENT)
Dept: CARDIOLOGY | Facility: CLINIC | Age: 56
End: 2021-01-22

## 2021-01-22 DIAGNOSIS — R06.00 DYSPNEA: Primary | ICD-10-CM

## 2021-01-22 NOTE — PROGRESS NOTES
Received update from scheduling that pt is now scheduled for Right Heart Cath procedure on 1/28/21 and follow up with [facility] on 2/5/21.  states that pt mentioned talking with [facility] C[facility] about needing anxiety medication prescribed for the procedure. Will route to [facility] for review.    CHRIS Fox 11:22 AM 1/22/2021

## 2021-01-25 ENCOUNTER — OFFICE VISIT (OUTPATIENT)
Dept: LAB | Facility: CLINIC | Age: 56
End: 2021-01-25
Attending: PHYSICIAN ASSISTANT
Payer: COMMERCIAL

## 2021-01-25 VITALS — HEIGHT: 63 IN | WEIGHT: 205 LBS | BODY MASS INDEX: 36.32 KG/M2

## 2021-01-25 DIAGNOSIS — R06.00 DYSPNEA: ICD-10-CM

## 2021-01-25 LAB
SARS-COV-2 RNA RESP QL NAA+PROBE: NORMAL
SPECIMEN SOURCE: NORMAL

## 2021-01-25 PROCEDURE — U0005 INFEC AGEN DETEC AMPLI PROBE: HCPCS | Performed by: PHYSICIAN ASSISTANT

## 2021-01-25 PROCEDURE — U0003 INFECTIOUS AGENT DETECTION BY NUCLEIC ACID (DNA OR RNA); SEVERE ACUTE RESPIRATORY SYNDROME CORONAVIRUS 2 (SARS-COV-2) (CORONAVIRUS DISEASE [COVID-19]), AMPLIFIED PROBE TECHNIQUE, MAKING USE OF HIGH THROUGHPUT TECHNOLOGIES AS DESCRIBED BY CMS-2020-01-R: HCPCS | Performed by: PHYSICIAN ASSISTANT

## 2021-01-25 ASSESSMENT — MIFFLIN-ST. JEOR: SCORE: 1489

## 2021-01-25 NOTE — PATIENT INSTRUCTIONS
Your BMI is Body mass index is 36.31 kg/m .  Weight management is a personal decision.  If you are interested in exploring weight loss strategies, the following discussion covers the approaches that may be successful. Body mass index (BMI) is one way to tell whether you are at a healthy weight, overweight, or obese. It measures your weight in relation to your height.  A BMI of 18.5 to 24.9 is in the healthy range. A person with a BMI of 25 to 29.9 is considered overweight, and someone with a BMI of 30 or greater is considered obese. More than two-thirds of American adults are considered overweight or obese.  Being overweight or obese increases the risk for further weight gain. Excess weight may lead to heart disease and diabetes.  Creating and following plans for healthy eating and physical activity may help you improve your health.  Weight control is part of healthy lifestyle and includes exercise, emotional health, and healthy eating habits. Careful eating habits lifelong are the mainstay of weight control. Though there are significant health benefits from weight loss, long-term weight loss with diet alone may be very difficult to achieve- studies show long-term success with dietary management in less than 10% of people. Attaining a healthy weight may be especially difficult to achieve in those with severe obesity. In some cases, medications, devices and surgical management might be considered.  What can you do?  If you are overweight or obese and are interested in methods for weight loss, you should discuss this with your provider.     Consider reducing daily calorie intake by 500 calories.     Keep a food journal.     Avoiding skipping meals, consider cutting portions instead.    Diet combined with exercise helps maintain muscle while optimizing fat loss. Strength training is particularly important for building and maintaining muscle mass. Exercise helps reduce stress, increase energy, and improves fitness.  Increasing exercise without diet control, however, may not burn enough calories to loose weight.       Start walking three days a week 10-20 minutes at a time    Work towards walking thirty minutes five days a week     Eventually, increase the speed of your walking for 1-2 minutes at time    In addition, we recommend that you review healthy lifestyles and methods for weight loss available through the National Institutes of Health patient information sites:  http://win.niddk.nih.gov/publications/index.htm    And look into health and wellness programs that may be available through your health insurance provider, employer, local community center, or josh club.    Weight management plan: Patient was referred to their PCP to discuss a diet and exercise plan.

## 2021-01-25 NOTE — PROGRESS NOTES
Radha is a 55 year old who is being evaluated via a billable video visit.      How would you like to obtain your AVS? MyChart  If the video visit is dropped, the invitation should be resent by: Text to cell phone: [phone]  Will anyone else be joining your video visit? No    Video Start Time: 9:434 AM     Video-Visit Details    Type of service:  Video Visit    Video End Time:9:56 AM    Originating Location (pt. Location): Home    Distant Location (provider location):  [facility]     Platform used for Video Visit: [employer]    .SLEEP MEDICINE VIRTUAL VIDEO FOLLOW-UP VISIT  Sleep Psychology    Patient Name: Radha Gomez  MRN:  [id number]  Date of Service: Jan 26, 2021       Subjective Report     Radha Gomez  returns for a telehealth video visit to discuss progress in implementing behavioral strategies for the management of insomnia, nightmares, shift work sleep disorder and irregular sleep-wake pattern.  Patient consent for initiation of video visit was obtained and documented prior to initiation of visit.     Radha reports marginal improvement in insomnia with continued moderate symptoms.  She estimates that she is getting between 7-7.5 hours of sleep.  She is keeping a consistent, though advanced sleep schedule between 738-4:30 AM.  She reports that sleep latency is dependent upon use of the Seroquel.  She states that she has been taking 12 mg of melatonin which has not found reduce her REM behavior.  She also states that the melatonin has worsened the intensity and a variety of nightmares.    Patient states that she attempted to image rehearsal instructions but is convinced that they are not helpful due to the wide variation of her nightmares.      We discussed returning to see Dr. Chou, revisiting mild sleep apnea as possible triggering event for some of her nocturnal behaviors as well as revisiting the possible diagnoses of REM behavior disorder..     .     Sleep Data:  "    Source of Sleep Estimates:  verbal self-report      Total score - [place]: 8 .    RAQUEL Total Score: 19       Interventions     Strategies and recommendations including maintenance of stimulus control and advancer dleep phase, PTSD and nightmares, treatment of REM behavior were discussed today.       Vital Signs     Ht 1.6 m (5' 3\")   Wt 93 kg (205 lb)   BMI 36.31 kg/m       Mental Status     Appearance:  Well kept  Orientation:  X3  Mood:  normal  Affect:  Congruent with mood  Speech/Language:  Normal  Thought Process: Intact  Associations:  Normal  Thought Content: Normal  Patient does not report any suicidal ideation, intention or plan.    Diagnostic Impressions and Plan        Chronic insomnia  RBD (REM behavioral disorder)  Nightmares    Plan:  continue current sleep schedule and plan.  Patient is not likely to further benefit from sleep psychology intervention.    Follow-up: Follow-up with Dr. Demetrius Chou to address report of parasomnia, REM behaviorr and use of melatonin which patient indicates has not helped with nocturnal behavior and has worsened her nightmares.  Follow-up with psychiatwry and her psychotherapist around ongoing treatment of PTSD and addressing PTSD related nightmare content.      Demetrius Che, Smiley, LP, [facility]  Diplomate, Behavioral Sleep Medicine  [facility]      Note: This dictation was created using voice recognition software. This document may contain an error not identified before finalizing the document. If the error changes the accuracy of the document, I would appreciate it being brought to my attention.                             "

## 2021-01-26 ENCOUNTER — VIRTUAL VISIT (OUTPATIENT)
Dept: SLEEP MEDICINE | Facility: CLINIC | Age: 56
End: 2021-01-26
Payer: COMMERCIAL

## 2021-01-26 ENCOUNTER — DOCUMENTATION ONLY (OUTPATIENT)
Dept: CARDIOLOGY | Facility: CLINIC | Age: 56
End: 2021-01-26

## 2021-01-26 DIAGNOSIS — F51.5 NIGHTMARES: ICD-10-CM

## 2021-01-26 DIAGNOSIS — I50.33 ACUTE ON CHRONIC HEART FAILURE WITH PRESERVED EJECTION FRACTION (HFPEF) (H): Primary | ICD-10-CM

## 2021-01-26 DIAGNOSIS — F51.04 CHRONIC INSOMNIA: Primary | ICD-10-CM

## 2021-01-26 DIAGNOSIS — G47.52 RBD (REM BEHAVIORAL DISORDER): ICD-10-CM

## 2021-01-26 LAB
LABORATORY COMMENT REPORT: NORMAL
SARS-COV-2 RNA RESP QL NAA+PROBE: NEGATIVE
SPECIMEN SOURCE: NORMAL

## 2021-01-26 PROCEDURE — 90832 PSYTX W PT 30 MINUTES: CPT | Mod: GT | Performed by: PSYCHOLOGIST

## 2021-01-26 RX ORDER — SODIUM CHLORIDE 9 MG/ML
INJECTION, SOLUTION INTRAVENOUS CONTINUOUS
Status: CANCELLED | OUTPATIENT
Start: 2021-01-26

## 2021-01-26 RX ORDER — ASPIRIN 81 MG/1
81 TABLET ORAL DAILY
Status: CANCELLED | OUTPATIENT
Start: 2021-01-26 | End: 2021-01-28

## 2021-01-26 RX ORDER — LIDOCAINE 40 MG/G
CREAM TOPICAL
Status: CANCELLED | OUTPATIENT
Start: 2021-01-26

## 2021-01-26 RX ORDER — POTASSIUM CHLORIDE 1500 MG/1
20 TABLET, EXTENDED RELEASE ORAL
Status: CANCELLED | OUTPATIENT
Start: 2021-01-26

## 2021-01-26 NOTE — PROGRESS NOTES
"Update from Team 8 regarding patient's request for anxiety meds - per CALVIN Jes Miranda: \"She can get IV at time of procedure if needed\"    Contacted patient to review pre-cath procedures: patient is scheduled for coronary angiogram (right)  on 1/28/2021 . Patient's arrival time is 0630 and procedure time is 0830. Patient is aware to be NPO except for medications. Patient will hold the medication metformin . Patient has arranged for transportation and 24 hour f/u care. Patient has a known contract dye allergy - no dye planned for right heart cath. Patient is  diabetic - holding metformin x 1 dose. Patient is not taking anti-coagulation medication. Patient's renal function is WNL for procedure. Patient will continue daily aspirin dose 81. Order for procedure entered.    COVID-19 test result 1/26/2021 is negative.    Travel screening completed.    COVID-19 SYMPTOM SCREENING TOOL  Do you have one of the following NEW symptoms:     Fever (subjective or >100.0)? no     New cough: no     Shortness of breath  no      Chills? no      New loss of teste or smell? no     Generalized body aches? no     New persistent headache? no     New sore throat? no        Nausea, vomiting or diarrhea?  no        Have you had a positive COVID-19 diagnostic test (nasal swab test) in the last 14 days or are you waiting on test results due to an exposure or symptoms?  no    Has anyone told you to self-quarantine due to exposure to someone with COVID-19? no    Patient notified of visitor restriction: yes    Patient informed to wear mask? yes    Patient's appointment status: Patient will be seen as scheduled on 1/28/2021             "

## 2021-01-28 ENCOUNTER — HOSPITAL ENCOUNTER (OUTPATIENT)
Facility: CLINIC | Age: 56
Discharge: HOME | End: 2021-01-28
Admitting: INTERNAL MEDICINE
Payer: COMMERCIAL

## 2021-01-28 VITALS
HEART RATE: 66 BPM | WEIGHT: 205 LBS | HEIGHT: 63 IN | RESPIRATION RATE: 20 BRPM | BODY MASS INDEX: 36.32 KG/M2 | DIASTOLIC BLOOD PRESSURE: 66 MMHG | OXYGEN SATURATION: 92 % | SYSTOLIC BLOOD PRESSURE: 109 MMHG | TEMPERATURE: 98.7 F

## 2021-01-28 DIAGNOSIS — R91.8 PULMONARY NODULES: ICD-10-CM

## 2021-01-28 DIAGNOSIS — R74.8 ELEVATED ALDOLASE LEVEL: ICD-10-CM

## 2021-01-28 DIAGNOSIS — J84.112 IDIOPATHIC PULMONARY FIBROSIS (H): ICD-10-CM

## 2021-01-28 DIAGNOSIS — I51.7 IDIOPATHIC RIGHT VENTRICULAR DILATION: ICD-10-CM

## 2021-01-28 DIAGNOSIS — N30.10 INTERSTITIAL CYSTITIS: ICD-10-CM

## 2021-01-28 DIAGNOSIS — I27.20 PULMONARY HYPERTENSION (H): ICD-10-CM

## 2021-01-28 DIAGNOSIS — I50.33 ACUTE ON CHRONIC HEART FAILURE WITH PRESERVED EJECTION FRACTION (HFPEF) (H): ICD-10-CM

## 2021-01-28 DIAGNOSIS — J47.9 BRONCHIECTASIS WITHOUT COMPLICATION (H): ICD-10-CM

## 2021-01-28 PROBLEM — Z98.890 STATUS POST CORONARY ANGIOGRAM: Status: ACTIVE | Noted: 2021-01-28

## 2021-01-28 LAB
ANION GAP SERPL CALCULATED.3IONS-SCNC: 3 MMOL/L (ref 3–14)
APTT PPP: 27 SEC (ref 22–37)
BUN SERPL-MCNC: 13 MG/DL (ref 7–30)
CALCIUM SERPL-MCNC: 9.2 MG/DL (ref 8.5–10.1)
CHLORIDE SERPL-SCNC: 106 MMOL/L (ref 94–109)
CO2 SERPL-SCNC: 29 MMOL/L (ref 20–32)
CREAT SERPL-MCNC: 0.76 MG/DL (ref 0.52–1.04)
ERYTHROCYTE [DISTWIDTH] IN BLOOD BY AUTOMATED COUNT: 15.4 % (ref 10–15)
GFR SERPL CREATININE-BSD FRML MDRD: 87 ML/MIN/{1.73_M2}
GLUCOSE SERPL-MCNC: 100 MG/DL (ref 70–99)
HCT VFR BLD AUTO: 39.3 % (ref 35–47)
HGB BLD-MCNC: 12.8 G/DL (ref 11.7–15.7)
INR PPP: 0.98 (ref 0.86–1.14)
MCH RBC QN AUTO: 30.2 PG (ref 26.5–33)
MCHC RBC AUTO-ENTMCNC: 32.6 G/DL (ref 31.5–36.5)
MCV RBC AUTO: 93 FL (ref 78–100)
PLATELET # BLD AUTO: 317 10E9/L (ref 150–450)
POTASSIUM SERPL-SCNC: 4 MMOL/L (ref 3.4–5.3)
RBC # BLD AUTO: 4.24 10E12/L (ref 3.8–5.2)
SODIUM SERPL-SCNC: 138 MMOL/L (ref 133–144)
WBC # BLD AUTO: 8.8 10E9/L (ref 4–11)

## 2021-01-28 PROCEDURE — 82103 ALPHA-1-ANTITRYPSIN TOTAL: CPT | Performed by: INTERNAL MEDICINE

## 2021-01-28 PROCEDURE — 250N000009 HC RX 250: Performed by: INTERNAL MEDICINE

## 2021-01-28 PROCEDURE — 82803 BLOOD GASES ANY COMBINATION: CPT

## 2021-01-28 PROCEDURE — 82085 ASSAY OF ALDOLASE: CPT | Performed by: INTERNAL MEDICINE

## 2021-01-28 PROCEDURE — 999N000054 HC STATISTIC EKG NON-CHARGEABLE

## 2021-01-28 PROCEDURE — 85730 THROMBOPLASTIN TIME PARTIAL: CPT

## 2021-01-28 PROCEDURE — 83516 IMMUNOASSAY NONANTIBODY: CPT | Performed by: INTERNAL MEDICINE

## 2021-01-28 PROCEDURE — 99152 MOD SED SAME PHYS/QHP 5/>YRS: CPT | Performed by: INTERNAL MEDICINE

## 2021-01-28 PROCEDURE — 93010 ELECTROCARDIOGRAM REPORT: CPT | Performed by: INTERNAL MEDICINE

## 2021-01-28 PROCEDURE — 272N000001 HC OR GENERAL SUPPLY STERILE: Performed by: INTERNAL MEDICINE

## 2021-01-28 PROCEDURE — 93451 RIGHT HEART CATH: CPT | Performed by: INTERNAL MEDICINE

## 2021-01-28 PROCEDURE — 999N000071 HC STATISTIC HEART CATH LAB OR EP LAB

## 2021-01-28 PROCEDURE — 36591 DRAW BLOOD OFF VENOUS DEVICE: CPT

## 2021-01-28 PROCEDURE — 82104 ALPHA-1-ANTITRYPSIN PHENO: CPT | Performed by: INTERNAL MEDICINE

## 2021-01-28 PROCEDURE — 250N000011 HC RX IP 250 OP 636: Performed by: INTERNAL MEDICINE

## 2021-01-28 PROCEDURE — 81332 SERPINA1 GENE: CPT | Performed by: INTERNAL MEDICINE

## 2021-01-28 PROCEDURE — 80048 BASIC METABOLIC PNL TOTAL CA: CPT

## 2021-01-28 PROCEDURE — 86235 NUCLEAR ANTIGEN ANTIBODY: CPT | Performed by: INTERNAL MEDICINE

## 2021-01-28 PROCEDURE — 85027 COMPLETE CBC AUTOMATED: CPT

## 2021-01-28 PROCEDURE — 999N000184 HC STATISTIC TELEMETRY

## 2021-01-28 PROCEDURE — 85610 PROTHROMBIN TIME: CPT

## 2021-01-28 PROCEDURE — 258N000003 HC RX IP 258 OP 636: Performed by: INTERNAL MEDICINE

## 2021-01-28 PROCEDURE — 83516 IMMUNOASSAY NONANTIBODY: CPT

## 2021-01-28 PROCEDURE — 93005 ELECTROCARDIOGRAM TRACING: CPT

## 2021-01-28 PROCEDURE — 86255 FLUORESCENT ANTIBODY SCREEN: CPT | Performed by: INTERNAL MEDICINE

## 2021-01-28 PROCEDURE — C1894 INTRO/SHEATH, NON-LASER: HCPCS | Performed by: INTERNAL MEDICINE

## 2021-01-28 RX ORDER — SODIUM CHLORIDE 9 MG/ML
INJECTION, SOLUTION INTRAVENOUS CONTINUOUS
Status: DISCONTINUED | OUTPATIENT
Start: 2021-01-28 | End: 2021-01-28 | Stop reason: HOSPADM

## 2021-01-28 RX ORDER — LIDOCAINE 40 MG/G
CREAM TOPICAL
Status: DISCONTINUED | OUTPATIENT
Start: 2021-01-28 | End: 2021-01-28 | Stop reason: HOSPADM

## 2021-01-28 RX ORDER — NALOXONE HYDROCHLORIDE 0.4 MG/ML
0.2 INJECTION, SOLUTION INTRAMUSCULAR; INTRAVENOUS; SUBCUTANEOUS
Status: DISCONTINUED | OUTPATIENT
Start: 2021-01-28 | End: 2021-01-28 | Stop reason: HOSPADM

## 2021-01-28 RX ORDER — NALOXONE HYDROCHLORIDE 0.4 MG/ML
0.4 INJECTION, SOLUTION INTRAMUSCULAR; INTRAVENOUS; SUBCUTANEOUS
Status: DISCONTINUED | OUTPATIENT
Start: 2021-01-28 | End: 2021-01-28 | Stop reason: HOSPADM

## 2021-01-28 RX ORDER — ASPIRIN 81 MG/1
81 TABLET ORAL DAILY
Status: DISCONTINUED | OUTPATIENT
Start: 2021-01-28 | End: 2021-01-28 | Stop reason: HOSPADM

## 2021-01-28 RX ORDER — POTASSIUM CHLORIDE 1500 MG/1
20 TABLET, EXTENDED RELEASE ORAL
Status: DISCONTINUED | OUTPATIENT
Start: 2021-01-28 | End: 2021-01-28 | Stop reason: HOSPADM

## 2021-01-28 RX ORDER — LANOLIN ALCOHOL/MO/W.PET/CERES
12 CREAM (GRAM) TOPICAL AT BEDTIME
COMMUNITY

## 2021-01-28 RX ORDER — FLUMAZENIL 0.1 MG/ML
0.2 INJECTION, SOLUTION INTRAVENOUS
Status: DISCONTINUED | OUTPATIENT
Start: 2021-01-28 | End: 2021-01-28 | Stop reason: HOSPADM

## 2021-01-28 RX ORDER — FENTANYL CITRATE 50 UG/ML
INJECTION, SOLUTION INTRAMUSCULAR; INTRAVENOUS
Status: DISCONTINUED | OUTPATIENT
Start: 2021-01-28 | End: 2021-01-28 | Stop reason: HOSPADM

## 2021-01-28 RX ORDER — ACETAMINOPHEN 325 MG/1
650 TABLET ORAL EVERY 4 HOURS PRN
Status: DISCONTINUED | OUTPATIENT
Start: 2021-01-28 | End: 2021-01-28 | Stop reason: HOSPADM

## 2021-01-28 RX ORDER — FENTANYL CITRATE 50 UG/ML
25-50 INJECTION, SOLUTION INTRAMUSCULAR; INTRAVENOUS
Status: DISCONTINUED | OUTPATIENT
Start: 2021-01-28 | End: 2021-01-28 | Stop reason: HOSPADM

## 2021-01-28 RX ORDER — ATROPINE SULFATE 0.1 MG/ML
0.5 INJECTION INTRAVENOUS
Status: DISCONTINUED | OUTPATIENT
Start: 2021-01-28 | End: 2021-01-28 | Stop reason: HOSPADM

## 2021-01-28 RX ADMIN — SODIUM CHLORIDE: 9 INJECTION, SOLUTION INTRAVENOUS at 07:57

## 2021-01-28 ASSESSMENT — MIFFLIN-ST. JEOR: SCORE: 1481.06

## 2021-01-28 NOTE — PROGRESS NOTES
PATIENT/VISITOR WELLNESS SCREENING    Step 1 Patient Screening    1. In the last month, have you been in contact with someone who was confirmed or suspected to have Coronavirus/COVID-19? No    2. Do you have the following symptoms?  Fever/Chills? no   Cough? No   Shortness of breath? No   New loss of taste or smell? No  Sore throat? No  Muscle or body aches? No  Headaches? No  Fatigue? No  Vomiting or diarrhea? No

## 2021-01-28 NOTE — PROGRESS NOTES
Pt up and ambulating in the halls with stand by assist at 1000.  Pt states she does not need to void when up.  Tolerated po food and fluids without nausea. Denies pain. RIJ site without bleeding or hematoma. Pt will call for ride to home.

## 2021-01-28 NOTE — PROGRESS NOTES
Care Suites Admission Nursing Note    Patient Information  Name: Radha Gomez  Age: 55 year old  Reason for admission: coronary angiogram  Care Suites arrival time: 0700        Patient Admission/Assessment   Pre-procedure assessment complete: Yes  If abnormal assessment/labs, provider notified: pending  NPO: Yes  Medications held per instructions/orders: Yes  Consent: obtained  If applicable, pregnancy test status: Not ordered  Patient oriented to room: Yes  Education/questions answered: Yes  Plan/other: per procedural plan of care    Discharge Planning  Discharge name/phone number: see epic Overnight post sedation caregiver: yes  Discharge location: home    Conchita Turpin RN

## 2021-01-28 NOTE — PROGRESS NOTES
Care Suites Discharge Nursing Note    Patient Information  Name: Radha Gomez  Age: 55 year old    Discharge Education:  Discharge instructions reviewed: Yes  Additional education/resources provided: none  Patient/patient representative verbalizes understanding: Yes  Patient discharging on new medications: No  Medication education completed: N/A    Discharge Plans:   Discharge location: home  Discharge ride contacted: Yes  Approximate discharge time: 1015    Discharge Criteria:  Discharge criteria met and vital signs stable: Yes    Patient Belongs:  Patient belongings returned to patient: Yes    Conchita Turpin RN

## 2021-01-28 NOTE — PROGRESS NOTES
Discharge instructions reviewed with pt prior to discharge to home. Pt appears to accept and understand.

## 2021-01-28 NOTE — PRE-PROCEDURE
GENERAL PRE-PROCEDURE:   Procedure:  Right heart catheterization with moderate sedation  Date/Time:  1/28/2021 8:03 AM    Written consent obtained?: Yes    Risks and benefits: Risks, benefits and alternatives were discussed    Consent given by:  Patient  Patient states understanding of procedure being performed: Yes    Patient's understanding of procedure matches consent: Yes    Procedure consent matches procedure scheduled: Yes    Expected level of sedation:  Moderate  Appropriately NPO:  Yes  ASA Class:  Class 2- mild systemic disease, no acute problems, no functional limitations  Mallampati  :  Grade 2- soft palate, base of uvula, tonsillar pillars, and portion of posterior pharyngeal wall visible  Lungs:  Lungs clear with good breath sounds bilaterally  Heart:  Normal heart sounds and rate  History & Physical reviewed:  History and physical reviewed and no updates needed  Statement of review:  I have reviewed the lab findings, diagnostic data, medications, and the plan for sedation

## 2021-01-28 NOTE — PROGRESS NOTES
Pt returns to [facility]  [facility] site without bleeding or hematoma.  Pt denies pain, nausea or vomiting. Pt tolerating po fluids.

## 2021-01-28 NOTE — DISCHARGE INSTRUCTIONS
Right Heart Cath Discharge Instructions - Neck     After you go home:      Have an adult stay with you until tomorrow.    Drink extra fluids for 2 days.    You may resume your normal diet.    No smoking       For 24 hours - due to the sedation you received:    Relax and take it easy.    Do NOT make any important or legal decisions.    Do NOT drive or operate machines at home or at work.    Do NOT drink alcohol.    Care of Neck Puncture Site:      For the first 24 hrs - check the puncture site every 1-2 hours while awake.    It is normal to have soreness at the puncture site and mild tingling in your hand for up to 3 days.    Remove the bandaid after 24 hours. If there is minor oozing, apply another bandaid and remove it after 12 hours.    You may shower tomorrow. Do NOT take a bath, or use a hot tub or pool for at least 3 days. Do NOT scrub the site. Do not use lotion or powder near the puncture site.           Activity - For 2 days:      Avoid heavy lifting or the overuse of your shoulder.      Bleeding:       If you start bleeding from the site in your neck, sit down and press gently on the site for 10 minutes.     Once bleeding stops, sit still for 2 hours.     Call [facility] as soon as you can    Call 911 right away if you have heavy bleeding or bleeding that does not stop.      Medicines:         Take your medications,  unless your provider tells you not to.      If you have stopped any medicines, check with your provider about when to restart them.    Follow Up Appointments:      Follow up with [facility] Nurse Practitioner at [facility] of patient preference in 7-10 days.    Call the clinic if:      You have increased pain or a large or growing hard lump around the site.    The site is red, swollen, hot or tender.    Blood or fluid is draining from the site.    You have chills or a fever greater than 101 F (38 C).    You have hives, a rash or unusual itching.    Any questions or  concerns.      [facility] [place]:    [phone] [facility] (7 days a week)

## 2021-01-28 NOTE — PROGRESS NOTES
Care Suites Discharge Summary    Discharge Criteria:   Discharge Criteria met per MD orders: Yes   Vital signs stable.     Pt demonstrates ability to ambulate safely: Yes.  (See discharge questionnaire for additional information)    Discharge instructions & education:   Discharge instructions reviewed with patient. Patient verbalizes  understanding.   Additional patient education provided:  none.     Medications:   Patient will be discharging on new medications- No. Patient verbalizes reason for use, start date, and side effects NA.    Items returned to patient:   Home and hospital acquired medications returned to patient NA   Listed belongings gathered and returned to patient: Yes    Patient discharged to home  with spouse driving.    Conchita Turpin RN

## 2021-01-29 LAB
ALDOLASE SERPL-CCNC: 3.5 U/L (ref 1.5–8.1)
CO2 BLDCOV-SCNC: 30 MMOL/L (ref 21–28)
PCO2 BLDV: 56 MM HG (ref 40–50)
PH BLDV: 7.33 PH (ref 7.32–7.43)
PO2 BLDV: 34 MM HG (ref 25–47)
SAO2 % BLDV FROM PO2: 59 %

## 2021-01-30 LAB
A1AT PHENOTYP SERPL-IMP: NORMAL
A1AT SERPL-MCNC: 119 MG/DL (ref 90–200)

## 2021-02-01 LAB
A1AT PHENOTYP SERPL-IMP: NORMAL
A1AT SERPL-MCNC: 119 MG/DL (ref 90–200)
A1AT SS SERPL-MCNC: NEGATIVE G/L
A1AT SZ SERPL-MCNC: NORMAL G/L
A1AT ZZ SERPL-MCNC: NEGATIVE G/L
ANCA AB PATTERN SER IF-IMP: NORMAL
C-ANCA TITR SER IF: NORMAL {TITER}
SPECIMEN SOURCE: NORMAL

## 2021-02-02 LAB — INTERPRETATION ECG - MUSE: NORMAL

## 2021-02-05 ENCOUNTER — VIRTUAL VISIT (OUTPATIENT)
Dept: CARDIOLOGY | Facility: CLINIC | Age: 56
End: 2021-02-05
Payer: COMMERCIAL

## 2021-02-05 DIAGNOSIS — I50.33 ACUTE ON CHRONIC HEART FAILURE WITH PRESERVED EJECTION FRACTION (HFPEF) (H): Primary | ICD-10-CM

## 2021-02-05 PROCEDURE — 99213 OFFICE O/P EST LOW 20 MIN: CPT | Mod: 95 | Performed by: PHYSICIAN ASSISTANT
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
## 2021-02-09 LAB
ANNOTATION COMMENT IMP: NORMAL
EJ AB SER QL: NEGATIVE
ENA JO1 AB TITR SER: 0 AU/ML (ref 0–40)
MDA5 (CADM 140) ABY: NEGATIVE
MI2 AB SER QL: NEGATIVE
NXP-2 (NUCLEAR MATRIX PROTEIN 2) ABY: NEGATIVE
OJ AB SER QL: NEGATIVE
P155/140 (TIF1-GAMMA) ANTIBODY: NEGATIVE
PL12 AB SER QL: NEGATIVE
PL7 AB SER QL: NEGATIVE
SAE1 (SUMO ACTIVATING ENZYME) ABY: NEGATIVE
SRP AB SERPL QL: NEGATIVE
TIF-1 GAMMA ANTIBODY: NEGATIVE

## 2021-03-24 ENCOUNTER — TRANSFERRED RECORDS (OUTPATIENT)
Dept: HEALTH INFORMATION MANAGEMENT | Facility: CLINIC | Age: 56
End: 2021-03-24

## 2021-05-09 ENCOUNTER — HEALTH MAINTENANCE LETTER (OUTPATIENT)
Age: 56
End: 2021-05-09

## 2021-05-11 ENCOUNTER — APPOINTMENT (OUTPATIENT)
Dept: CT IMAGING | Facility: CLINIC | Age: 56
End: 2021-05-11
Attending: EMERGENCY MEDICINE
Payer: COMMERCIAL

## 2021-05-11 ENCOUNTER — APPOINTMENT (OUTPATIENT)
Dept: GENERAL RADIOLOGY | Facility: CLINIC | Age: 56
End: 2021-05-11
Attending: EMERGENCY MEDICINE
Payer: COMMERCIAL

## 2021-05-11 ENCOUNTER — HOSPITAL ENCOUNTER (EMERGENCY)
Facility: CLINIC | Age: 56
Discharge: HOME | End: 2021-05-11
Attending: EMERGENCY MEDICINE | Admitting: EMERGENCY MEDICINE
Payer: COMMERCIAL

## 2021-05-11 VITALS
TEMPERATURE: 98.2 F | OXYGEN SATURATION: 97 % | RESPIRATION RATE: 16 BRPM | BODY MASS INDEX: 36.68 KG/M2 | HEART RATE: 60 BPM | DIASTOLIC BLOOD PRESSURE: 90 MMHG | WEIGHT: 207 LBS | HEIGHT: 63 IN | SYSTOLIC BLOOD PRESSURE: 160 MMHG

## 2021-05-11 DIAGNOSIS — F31.9 BIPOLAR I DISORDER (H): ICD-10-CM

## 2021-05-11 DIAGNOSIS — D72.829 LEUKOCYTOSIS, UNSPECIFIED TYPE: ICD-10-CM

## 2021-05-11 DIAGNOSIS — T56.891A LITHIUM TOXICITY, ACCIDENTAL OR UNINTENTIONAL, INITIAL ENCOUNTER: ICD-10-CM

## 2021-05-11 LAB
ALBUMIN SERPL-MCNC: 3.3 G/DL (ref 3.4–5)
ALBUMIN UR-MCNC: NEGATIVE MG/DL
ALP SERPL-CCNC: 170 U/L (ref 40–150)
ALT SERPL W P-5'-P-CCNC: 21 U/L (ref 0–50)
AMORPH CRY #/AREA URNS HPF: ABNORMAL /HPF
ANION GAP SERPL CALCULATED.3IONS-SCNC: 6 MMOL/L (ref 3–14)
APPEARANCE UR: CLEAR
AST SERPL W P-5'-P-CCNC: 29 U/L (ref 0–45)
BACTERIA #/AREA URNS HPF: ABNORMAL /HPF
BASOPHILS # BLD AUTO: 0.1 10E9/L (ref 0–0.2)
BASOPHILS NFR BLD AUTO: 0.3 %
BILIRUB SERPL-MCNC: 0.4 MG/DL (ref 0.2–1.3)
BILIRUB UR QL STRIP: NEGATIVE
BUN SERPL-MCNC: 12 MG/DL (ref 7–30)
CALCIUM SERPL-MCNC: 10.4 MG/DL (ref 8.5–10.1)
CHLORIDE SERPL-SCNC: 108 MMOL/L (ref 94–109)
CO2 SERPL-SCNC: 26 MMOL/L (ref 20–32)
COLOR UR AUTO: ABNORMAL
CREAT SERPL-MCNC: 1 MG/DL (ref 0.52–1.04)
DIFFERENTIAL METHOD BLD: ABNORMAL
EOSINOPHIL # BLD AUTO: 0.2 10E9/L (ref 0–0.7)
EOSINOPHIL NFR BLD AUTO: 1 %
ERYTHROCYTE [DISTWIDTH] IN BLOOD BY AUTOMATED COUNT: 14.2 % (ref 10–15)
GFR SERPL CREATININE-BSD FRML MDRD: 63 ML/MIN/{1.73_M2}
GLUCOSE SERPL-MCNC: 112 MG/DL (ref 70–99)
GLUCOSE UR STRIP-MCNC: NEGATIVE MG/DL
HCT VFR BLD AUTO: 42.2 % (ref 35–47)
HGB BLD-MCNC: 13.9 G/DL (ref 11.7–15.7)
HGB UR QL STRIP: NEGATIVE
HYALINE CASTS #/AREA URNS LPF: 22 /LPF (ref 0–2)
IMM GRANULOCYTES # BLD: 0.1 10E9/L (ref 0–0.4)
IMM GRANULOCYTES NFR BLD: 0.4 %
KETONES UR STRIP-MCNC: NEGATIVE MG/DL
LABORATORY COMMENT REPORT: NORMAL
LEUKOCYTE ESTERASE UR QL STRIP: NEGATIVE
LITHIUM SERPL-SCNC: 1.19 MMOL/L (ref 0.6–1.2)
LYMPHOCYTES # BLD AUTO: 1.7 10E9/L (ref 0.8–5.3)
LYMPHOCYTES NFR BLD AUTO: 9 %
MCH RBC QN AUTO: 30.5 PG (ref 26.5–33)
MCHC RBC AUTO-ENTMCNC: 32.9 G/DL (ref 31.5–36.5)
MCV RBC AUTO: 93 FL (ref 78–100)
MONOCYTES # BLD AUTO: 1.3 10E9/L (ref 0–1.3)
MONOCYTES NFR BLD AUTO: 6.9 %
MUCOUS THREADS #/AREA URNS LPF: PRESENT /LPF
NEUTROPHILS # BLD AUTO: 15.7 10E9/L (ref 1.6–8.3)
NEUTROPHILS NFR BLD AUTO: 82.4 %
NITRATE UR QL: NEGATIVE
NRBC # BLD AUTO: 0 10*3/UL
NRBC BLD AUTO-RTO: 0 /100
PH UR STRIP: 7 PH (ref 5–7)
PLATELET # BLD AUTO: 384 10E9/L (ref 150–450)
POTASSIUM SERPL-SCNC: 3.1 MMOL/L (ref 3.4–5.3)
PROT SERPL-MCNC: 7.1 G/DL (ref 6.8–8.8)
RBC # BLD AUTO: 4.56 10E12/L (ref 3.8–5.2)
RBC #/AREA URNS AUTO: 1 /HPF (ref 0–2)
SARS-COV-2 RNA RESP QL NAA+PROBE: NEGATIVE
SODIUM SERPL-SCNC: 140 MMOL/L (ref 133–144)
SOURCE: ABNORMAL
SP GR UR STRIP: 1.01 (ref 1–1.03)
SPECIMEN SOURCE: NORMAL
SQUAMOUS #/AREA URNS AUTO: 2 /HPF (ref 0–1)
UROBILINOGEN UR STRIP-MCNC: 0 MG/DL (ref 0–2)
WBC # BLD AUTO: 19.1 10E9/L (ref 4–11)
WBC #/AREA URNS AUTO: 2 /HPF (ref 0–5)

## 2021-05-11 PROCEDURE — 85025 COMPLETE CBC W/AUTO DIFF WBC: CPT | Performed by: EMERGENCY MEDICINE

## 2021-05-11 PROCEDURE — 71045 X-RAY EXAM CHEST 1 VIEW: CPT

## 2021-05-11 PROCEDURE — 90791 PSYCH DIAGNOSTIC EVALUATION: CPT

## 2021-05-11 PROCEDURE — 99285 EMERGENCY DEPT VISIT HI MDM: CPT | Mod: 25

## 2021-05-11 PROCEDURE — 80178 ASSAY OF LITHIUM: CPT | Performed by: EMERGENCY MEDICINE

## 2021-05-11 PROCEDURE — 81001 URINALYSIS AUTO W/SCOPE: CPT | Performed by: EMERGENCY MEDICINE

## 2021-05-11 PROCEDURE — 87040 BLOOD CULTURE FOR BACTERIA: CPT | Performed by: EMERGENCY MEDICINE

## 2021-05-11 PROCEDURE — 36415 COLL VENOUS BLD VENIPUNCTURE: CPT

## 2021-05-11 PROCEDURE — 99204 OFFICE O/P NEW MOD 45 MIN: CPT | Performed by: PSYCHIATRY & NEUROLOGY

## 2021-05-11 PROCEDURE — 87635 SARS-COV-2 COVID-19 AMP PRB: CPT | Performed by: EMERGENCY MEDICINE

## 2021-05-11 PROCEDURE — 80053 COMPREHEN METABOLIC PANEL: CPT | Performed by: EMERGENCY MEDICINE

## 2021-05-11 PROCEDURE — C9803 HOPD COVID-19 SPEC COLLECT: HCPCS

## 2021-05-11 PROCEDURE — 74176 CT ABD & PELVIS W/O CONTRAST: CPT

## 2021-05-11 ASSESSMENT — ACTIVITIES OF DAILY LIVING (ADL): HYGIENE/GROOMING: INDEPENDENT

## 2021-05-11 ASSESSMENT — ENCOUNTER SYMPTOMS
TREMORS: 1
HYPERACTIVE: 0
AGITATION: 0
SLEEP DISTURBANCE: 0
NERVOUS/ANXIOUS: 0
DECREASED CONCENTRATION: 1
COUGH: 1
DYSPHORIC MOOD: 0
HALLUCINATIONS: 0
SPEECH DIFFICULTY: 1
NERVOUS/ANXIOUS: 1
CONFUSION: 1
ABDOMINAL PAIN: 1

## 2021-05-11 ASSESSMENT — MIFFLIN-ST. JEOR: SCORE: 1493.08

## 2021-05-11 NOTE — ED PROVIDER NOTES
ED Psychiatric EmPATH Note  [facility] Emergency Department - EmPATH Unit    Radha Gomez MRN: [id number]   Age: 56 year old YOB: 1965     History     Chief Complaint   Patient presents with     Psychiatric Evaluation     Pt states she has had increased confusion after going off lithium last saturday, denies SI.  Pt reports she had been taking lithium for her bipolor for 2 weeks prior to stopping it.     The history is provided by the patient, the spouse and medical records.     Radha Gomez is a 56 year old female who comes to the EmPATH after being medically cleared for memory issues.  She has been having memory issues and confusion for several months with no etiology.  Mood wise she has been fairly stable.  She is sleeping ok, not feeling low or high and not having any suicidal or homicidal thoughts.  She is not manic or hypomanic.  She is not depressed.  She denies any hallucinations.  The memory issues got acutely worse this weekend after a week of lithium.  She started the lithium for her bipolar disorder.  She got shaky, confused, struggling to know what was going on and overall felt not well.  She had some abdominal pain and was not eating well.  She stopped her lithium 3 days ago due to this.  She also stopped her other medications because she was not eating.      Past Medical History  Past Medical History:   Diagnosis Date     Anxiety      Arthritis      Back injury      Bipolar 1 disorder (H)      Chest pain      Coronary artery disease     non-obstructive, per cath 4/2016     Depressive disorder      HLD (hyperlipidemia)      IC (interstitial cystitis) age 21    feels like a  bladder infection- very painful; symptoms since 15     Osteoarthritis      Palpitations      PTSD (post-traumatic stress disorder)      Tooth abscess -- S/P 2 teeth extracted 8/30/19 8/30/2019     Past Surgical History:   Procedure Laterality Date     ABDOMEN SURGERY      3 laproscopies     BIOPSY      bladder      COLONOSCOPY  age 47     CV RIGHT HEART CATH MEASUREMENTS RECORDED N/A 1/28/2021    Procedure: Right Heart Cath;  Surgeon: Dolores Lin MD;  Location:  HEART CARDIAC CATH LAB     ESOPHAGOSCOPY, GASTROSCOPY, DUODENOSCOPY (EGD), COMBINED  6/24/2013    Procedure: COMBINED ESOPHAGOSCOPY, GASTROSCOPY, DUODENOSCOPY (EGD), BIOPSY SINGLE OR MULTIPLE;  gastroscopy;  Surgeon: Nathalie Cotter MD;  Location:  GI     EXTRACTION(S) DENTAL N/A 8/30/2019    Procedure: EXTRACTION, TOOTH #30 AND #28;  Surgeon: Rachid Pérez DDS;  Location:  OR     GYN SURGERY      venereal warts removed     HC LEFT HEART CATHETERIZATION  4/15/16    non-obstructive CAD     INCISION AND DRAINAGE MANDIBLE, COMBINED N/A 8/30/2019    Procedure: INCISION AND DRAINAGE, MANDIBLE ABSCESS;  Surgeon: Rachid Pérez DDS;  Location:  OR     LAPAROSCOPIC CHOLECYSTECTOMY  6/25/2013    Procedure: LAPAROSCOPIC CHOLECYSTECTOMY;  LAPAROSCOPIC CHOLECYSTECTOMY.;  Surgeon: Salomón Wall MD;  Location:  OR     laporoscopy       ORTHOPEDIC SURGERY       ALPRAZolam (XANAX) 0.25 MG tablet  aspirin 81 MG EC tablet  busPIRone HCl (BUSPAR) 30 MG tablet  dulaglutide (TRULICITY) 0.75 MG/0.5ML pen  furosemide (LASIX) 20 MG tablet  lamoTRIgine (LAMICTAL) 25 MG tablet  lurasidone (LATUDA) 60 MG TABS tablet  melatonin 3 MG tablet  metFORMIN (GLUCOPHAGE) 1000 MG tablet  methadone (DOLOPHINE) 10 MG tablet  phenazopyridine (PYRIDIUM) 200 MG tablet  propranolol ER (INDERAL LA) 60 MG 24 hr capsule  QUEtiapine (SEROQUEL) 300 MG tablet  spironolactone (ALDACTONE) 25 MG tablet  venlafaxine (EFFEXOR-XR) 150 MG 24 hr capsule      Allergies   Allergen Reactions     Contrast Dye Swelling     Tongue swelling per pt report  Occurred in 1990     Abilify [Aripiprazole] Cramps     Total body- couldn't walk     Paxil [Paroxetine] Other (See Comments)     Total body pain     Ditropan [Oxybutynin Chloride]      Can't Urinate, Per Pt.     Ibuprofen       Wellbutrin [Bupropion] Palpitations     If not XL     Family History  Family History   Problem Relation Age of Onset     Depression Mother      Anxiety Disorder Mother      Substance Abuse Mother      Dementia Mother      Mental Illness Mother         sexually abused by step father      Coronary Artery Disease Mother      Diabetes Mother      Depression Father      Substance Abuse Father      Mental Illness Father         [employer] vet- blamed others, angry     Heart Failure Father      Dementia Maternal Grandmother      Suicide Sister 36        OD     Mental Illness Sister         DID, neglected, abused and in foster homes     Emphysema Sister      Bipolar Disorder Son      Depression Son      Mental Illness Son         ODD     Suicide Daughter 15        attempted X2, OD and cutting; OD     Depression Daughter      Anxiety Disorder Daughter      Mental Illness Daughter 12        [email or web address]. PTSD     Depression Sister      Anxiety Disorder Sister      Chronic Obstructive Pulmonary Disease Sister      Social History   Social History     Tobacco Use     Smoking status: Current Every Day Smoker     Packs/day: 1.00     Types: Cigarettes     Start date: 1979     Smokeless tobacco: Never Used     Tobacco comment: QUIT FOR 5 YEARS   Substance Use Topics     Alcohol use: Yes     Comment: 1 BEER PER DAY     Drug use: No      Past medical history, past surgical history, medications, allergies, family history, and social history were reviewed with the patient. No additional pertinent items.       Review of Systems   Psychiatric/Behavioral: Positive for confusion and decreased concentration. Negative for agitation, dysphoric mood, hallucinations, self-injury, sleep disturbance and suicidal ideas. The patient is not nervous/anxious and is not hyperactive.      A complete review of systems was performed with pertinent positives and negatives noted in the HPI, and all other systems negative.    Physical Examination   BP: (!)  "149/90  Pulse: 105  Temp: 97.4  F (36.3  C)  Resp: 14  Height: 160 cm (5' 3\")  Weight: 93.9 kg (207 lb)  SpO2: 96 %    Physical Exam  General:  Appears stated age.   Neuro: alert and fully oriented.  Grossly normal strength in all extremities.   Integumentary/Skin: no rash visualized, normal color    Psychiatric Examination   Appearance: awake, alert  Attitude:  cooperative  Eye Contact:  good  Mood:  good  Affect:  appropriate and in normal range  Speech:  clear, coherent  Psychomotor Behavior:  no evidence of tardive dyskinesia, dystonia, or tics  Throught Process:  logical, linear and goal oriented  Associations:  no loose associations  Thought Content:  no evidence of suicidal ideation or homicidal ideation and no evidence of psychotic thought  Insight:  good  Judgement:  intact  Oriented to:  time, person, and place  Attention Span and Concentration:  fair  Recent and Remote Memory:  fair    ED Course        Labs Ordered and Resulted from Time of ED Arrival Up to the Time of Departure from the ED   CBC WITH PLATELETS DIFFERENTIAL - Abnormal; Notable for the following components:       Result Value    WBC 19.1 (*)     Absolute Neutrophil 15.7 (*)     All other components within normal limits   COMPREHENSIVE METABOLIC PANEL - Abnormal; Notable for the following components:    Potassium 3.1 (*)     Glucose 112 (*)     Calcium 10.4 (*)     Albumin 3.3 (*)     Alkaline Phosphatase 170 (*)     All other components within normal limits   ROUTINE UA WITH MICROSCOPIC - Abnormal; Notable for the following components:    Bacteria Urine Few (*)     Squamous Epithelial /HPF Urine 2 (*)     Mucous Urine Present (*)     Hyaline Casts 22 (*)     Amorphous Crystals Few (*)     All other components within normal limits   SARS-COV-2 (COVID-19) VIRUS RT-PCR   LITHIUM LEVEL   BLOOD CULTURE   BLOOD CULTURE       Assessments & Plan (with Medical Decision Making)   Patient presenting with confusion and memory issues. Nursing notes " reviewed.     I have reviewed the DEC assessment complete by Loreta Nazraio dated 5/11/21.    Radha will be discharged home. She is not an imminent risk to herself or others. It appears that she was lithium toxic over the weekend.  Her lithium level was 1.19 which is in the normal range but much too high for someone who has not taken the lithium in 3 days.  This would be a likely explanation for her symptoms as this would fit with lithium toxicity.  This is not due to mood symptoms at this time.  She should continue to clear from the lithium toxicity over the next few days.  This does not explain the reason for her memory issues prior to taking the lithium.  This will need further work up, such as a neuropsych test or a memory work up.  This can be done outpatient with her primary care provider.  She will follow up with her primary care provider, therapist and psychiatrist.  She understands she is to restart medications except lithium.  Her  was told this as well.  She was encouraged to drink plenty of fluids and try to eat as this will help clear the lithium as well.  There were no lab signs of kidney damage, so she should be able to clear the lithium without issues.  The patient and  understand the plan and agree.      Preliminary diagnosis:  Lithium toxicity suspected  Bipolar disorder    --  J Carlos Butler MD   [facility] EMERGENCY DEPT  EmPATH Unit  5/11/2021        J Carlos Butler MD  05/11/21 1700

## 2021-05-11 NOTE — CONSULTS
"5/11/2021  Radha Gomez 1965     [facility] Mental Health Assessment:    Started at: 3:30pm   Completed at: 4:00pm   What type of assessment are you doing today? Full DA    1.  Presenting Problem:      Referral Method to ED? Community Provider and Medics     What brings the patient to the ED today? Radha presented to ED for worsening sx of confusion. She reported that she was not feeling well this morning and wanted to call her  to have him come help her, but reports that she could not remember or figure out how to dial the phone. She reported that her psychiatry clinic (Dr. Alessandro Andre with [facility]) called her to confirm an appt and while she was on the phone with them the nurse stated that she was concerned and was going to call 911 to have an ambulance come get Radha. Radha stated that she was glad this happened because she was very concerned about her memory and confusion as well. Radha reported that she started lithium last Saturday 5/1 and her and her  started noticing worsening confusion and shaking. She reported that she started to feel \"brain foggy\" and stated, \"I can't form a sentence, I can see the word, but can't get it out.\" Of note, Radha's lithium level was tested today in the ED and she was on the higher end, indicating the possibility that she was experiencing lithium toxicity over the weekend. See MD note by Dr. Rickie Butler, for further details. MD note copied below as well.     Radha noted that she has had MH concerns for over 30 years and has a dx of bipolar disorder. Radha [facility] regularly for several years. She reported that her last hypomanic episode was months ago and she spent the entire night reorganizing her clothes dresser. She stated that she has never spent too much money or engaged in any kind of reckless behavior due to her bipolar dx. Radha stated that she does not feel hypomanic now. Writer " "observation confirms this, no notable signs of hypomania present.     Writer spoke with Radha's , Oscar, via phone. Oscar stated that he has been concerned about Radha's increased confusion over the last few months. He stated that the confusion has worsened over the last week and also believes that the lithium medication was an issue for Radha. He reported that last year Radha experienced some serious medical concerns (kidney failure and congestive heart failure). He reported that she had neuropsych testing done, but the results were inconclusive and he doesn't know if the issues were long-term medication side effects or something else. He stated that earlier this year Radha called him from [place] (they live in [place]) and she reported that she didn't know where she was. Oscar reported that he now has a tracking francesca on his phone so that he can see where Radha is in case she gets lost again. Oscar described her as experiencing \"fugue states.\"     \"Radha Gomez is a 56 year old female who comes to the [facility] after being medically cleared for memory issues.  She has been having memory issues and confusion for several months with no etiology.  Mood wise she has been fairly stable.  She is sleeping ok, not feeling low or high and not having any suicidal or homicidal thoughts.  She is not manic or hypomanic.  She is not depressed.  She denies any hallucinations.  The memory issues got acutely worse this weekend after a week of lithium.  She started the lithium for her bipolar disorder.  She got shaky, confused, struggling to know what was going on and overall felt not well.  She had some abdominal pain and was not eating well.  She stopped her lithium 3 days ago due to this.  She also stopped her other medications because she was not eating.    Radha will be discharged home. She is not an imminent risk to herself or others. It appears that she was lithium toxic over the weekend.  Her lithium level was 1.19 " "which is in the normal range but much too high for someone who has not taken the lithium in 3 days.  This would be a likely explanation for her symptoms as this would fit with lithium toxicity.  This is not due to mood symptoms at this time.  She should continue to clear from the lithium toxicity over the next few days.  This does not explain the reason for her memory issues prior to taking the lithium.  This will need further work up, such as a neuropsych test or a memory work up.  This can be done outpatient with her primary care provider.  She will follow up with her primary care provider, therapist and psychiatrist.  She understands she is to restart medications except lithium.  Her  was told this as well.  She was encouraged to drink plenty of fluids and try to eat as this will help clear the lithium as well.  There were no lab signs of kidney damage, so she should be able to clear the lithium without issues.  The patient and  understand the plan and agree.\" Dr. Rickie Butler MD    Has this happened before? No    Duration of presenting problem: experiencing confusion over the last few months, worsening confusion over the last week    Additional Stressors: medical concerns about a year ago and Radha reported that her son  years ago which was difficult     2.  Risk Assessment:  Suicide and Self-Harm    ESS-6  1.a. Over the past 2 weeks, have you had thoughts of killing yourself? No   1.b. Have you ever attempted to kill yourself and, if yes, when did this last happen? No  2. Recent or current suicide plan? No  3. Recent or current intent to act on ideation? No  4. Lifetime psychiatric hospitalization? Yes,   5. Pattern of excessive substance use? No  6. Current irritability, agitation, or aggression? No  ESS-6 Score: 1    SI: N/A  Plan: No  Intent: No   Prior Attempts: No     Protective Factors: not thoughts of SI/SIB/HI - Radha's  is very supportive and Radha is well connected with " her outpatient providers    Hopes and goals for the future: spend time with family     Coping Skills: What helps and doesn't help? Working with her therapist, talking with her     Additional Risk Factors Related to Safety and Suicide: No    Is the patient engaged in self injurious behaviors? No     Risk to Others    Aggressive/Assaultive/Homicidal Risk Factors: No     Duty to Warn? No     Was a Child Protection Report Made? No       Was a Adult Protection Report Made? No        Sexually inappropriate behavior? No        Vulnerability to sexual exploitation? No     Additional information:       3. Mental Health Symptoms and Substance Use  Current Symptoms and Mental Health History    GAIN Short Screener (GAIN-SS) administered? NA    Attention, Hyperactivity, and Impulsivity Symptoms      Patient reported symptoms related to hyperactivity, inattention, or impulsivity? No     Anxiety Symptoms    Patient reported anxiety symptoms? No       Behavioral Difficulties    Patient reported behavioral difficulties? No     Mood Symptoms    Patient reported mood disorder symptoms? No     Eating Disorders and Appetite Disturbance      Patient reported appetite symptoms? No     Interpersonal Functioning     Patient reported difficulties that may be associated with personality and interpersonal functioning? No  No sx reported today, per chart review there is a hx of personality disorder    Learning Disabilities/Cognitive/Developmental Disorders    Patient reported concerns related to learning disabilities, cognitive challenges, and/or developmental disorders? No     General Cognitive Impairments    Patient reported symptoms of cognitive impairments? Yes: Language Disturbance and Memory  If yes, complete Mini-Cog Assessment.  Did not complete today due to documented hx of neuropysch Elliot was eager to finish the assessment in order to meet with the attending provider.     Sleep Disturbance    Patient reported  difficulties with sleep? Yes: Other: Radha reported that she either doesn't sleep at all (awake until 4am) or she sleeps all of the time (12+ hours)      Psychosis Symptoms    Patient reported symptoms of psychosis? No      Trauma and Post-Traumatic Stress Disorder    Physical Abuse: No   Emotional/Psychological Abuse: No  Sexual Abuse: No  Loss of a friend or family member to suicide: No  Other Traumatic Event: No     Patient reported trauma related symptoms? No     Impact of Mental Health on Functioning      Negative Impact Score: currently there is little impact on Radha's functioning due to her MH, functioning is being impacted by neurocognitive concerns  Subjective Impact on functioning: difficult to work, gets lost while driving  How do symptoms vary from baseline? Worsening confusion    Current and Historical Substance Use Note:    IIs there a history of, or current, substance use? No     Have you been to chemical dependency treatment or detox before? No     CAGE-AID    Have you felt you ought to cut down on your drinking or drug use? No     Have people annoyed you by criticizing your drinking or drug use? No   Have you felt bad or guilty about your drinking or drug use? No  Have you ever had a drink or used drugs first thing in the morning to steady your nerves or to get rid of a hangover? No   CAGE-AID Score: 0/4    Drug screen completed? No   BAL/Breathalyzer completed? No       Mental Status Exam:    Affect: Appropriate  Appearance: Appropriate   Attention Span/Concentration: Attentive    Eye Contact: Engaged  Fund of Knowledge: Appropriate   Language /Speech Content: Fluent  Language /Speech Volume: Normal   Language /Speech Rate/Productions: Normal   Recent Memory: Variable  Remote Memory: Intact  Mood: Anxious and Normal   Orientation:   Person: Yes   Place: Yes  Time of Day: Yes   Date: Yes   Situation (Do they understand why they are here?): Yes   Psychomotor Behavior: Normal   Thought Content: Clear  and Other: good insight into why she is in the ED. Has been experiencing episodes of confusion lately.   Thought Form: Goal Directed and Intact    4. Social and Environmental Conditions   Is the patient their own guardian? Yes    Living Situation: With others: with  Oscar    Support system and quality of connections: Oscar and their daughter is supportive. Radha is well connected with her psych provider and therapist    Income source: Employment: [email or web address]     Issues with employment or education: No    Legal Concerns  Do you have any history of or current involvement with the legal system? No    Spiritual and Cultural Influences  Do you have any Buddhism beliefs that are important in your life? No     Do you have any cultural influences in your life that impact your mental health care? No        5. Psychiatric History, Medical History, and Current Care      Patient Mental Health Services   Does the patient have a history of mental health concerns/diagnoses? Yes dx of bipolar disorder for about 30 years     Current Providers  Primary Care Provider: Yes Dr. Caren Parikh, [facility]   Psychiatrist: Yes Dr. Alessandro Andre with [facility] in [place]   Therapist: Yes Michelle Tilley LP, [facility] with [facility]   : No   ARMHS: No   ACT Team: No   Other: No    History of Commitment? No  History of Psychiatric Hospitalizations? Yes in 2015   History of programmatic care? No    Family Mental Health History   Family History of Mental Health or Chemical Dependency Issues? No     Development and Physical Health Challenges  Delays or concerns meeting developmental milestones? No  Current psychotropic medications? Yes started lithium Saturday 5/1, but stopped taking Saturday 5/8 due to concerns about confusion    Medication Compliant? Yes   Recent medication changes? Yes    History of concussion or TBI? No     Additional Information:     6. Collateral Information and  "Collaboration    Collaboration with medical staff:Referral Information:   Medical Records, Psychiatry and Nursing     Collateral Information/Sources: Family:  Oscar Writer spoke with Radha's , Oscar, via phone. Oscar stated that he has been concerned about Radha's increased confusion over the last few months. He stated that the confusion has worsened over the last week and also believes that the lithium medication was an issue for Radha. He reported that last year Radha experienced some serious medical concerns (kidney failure and congestive heart failure). He reported that she had neuropsych testing done, but the results were inconclusive and he doesn't know if the issues were long-term medication side effects or something else. He stated that earlier this year Radha called him from [place] (they live in [place]) and she reported that she didn't know where she was. Oscar reported that he now has a tracking francesca on his phone so that he can see where Radha is in case she gets lost again. Oscar described her as experiencing \"fugue states.\"     7. Assessment and Diagnosis  Assessment of patient strengths and vulnerabilities    Strengths, Protective Factors, & Community Resources: Radha's  and their daughter are supportive. Radha is well connected with her psych provider and therapist    Patient skills, abilities, and coping skills (what is going well?): regularly sees therapist, actively seeks support as needed    Patient vulnerabilities: current concerns about neurocognitive issues that are impacting her memory     Diagnosis - By History - F31.9 Bipolar disorder, unspecified    8.Therapeutic Methodologies Utilized in Assessment    Psychotherapy techniques and/or interventions used: Establishing rapport, Active listening, Assess dimensions of crisis, Establish a discharge plan and Safety planning    9. Patient Care/Treatment Plan  Summary of Patient Presentation and needs  What are the basic needs " for this patient in this moment? Neuropsych evaluation, stop taking lithium      Consultations :  Attending provider consulted? Yes  Attending Name: Dr. Butler   Attending concurs with disposition? Yes     Recommended disposition: Individual Therapy, Medication Management and Other: neuropsych testing     Does the patient agree with the recommended level of care? Yes    Final disposition: Individual therapy , Medication management and Other: neuropsych testing    Disposition Details:     If Inpatient, is patient admitted voluntary? N/A   Patient aware of potential for transfer if there is not appropriate placement? NA  Patient is willing to travel outside of the [facility] for placement? NA   Central Intake Notified? NA    10. Patient Care Document: Safety and After Care Planning:          Safety Plan Provided? Yes:   If I am feeling unsafe or I am in a crisis, I will:  Contact my established care providers  Call the National Suicide Prevention Lifeline: [phone]  Go to the nearest emergency room  Call [phone]    Warning signs that I or other people might notice when a crisis is developing for me: my memory worsens, I can't get my words out, can't form a sentence, I feel shakier  Things I am able to do on my own to cope or help me feel better: practice coping skills learned in therapy  Things that I am able to do with others to cope or help me better: talk with my , talk with my therapist   Things I can use or do for distraction: try to eat a good meal and get good sleep  Changes I can make to support my mental health and wellness: take my medications as prescribed, keep seeing my psychiatrist, therapist and PCP  People in my life that I can ask for help: my , my daughter, my therapy and psychiatry clinic    Your [facility] has a mental health crisis team you can call 24/7: [facility] Crisis Line Number: [phone]    Follow-Up Plans and Providers: follow-up with your regular PCP, psychiatry provider and  therapist. Try to get good sleep, eat a good meal and drink lots of water.     Follow-Up Plan:  After care plan provided to the patient/guardian by: ISHAN Potter   After care plan provided to any additional sources/parties? Yes  Oscar    Duration of face to face time with patient in minutes: .50 hrs    CPT code(s) utilized: 01273 - Psychotherapy for Crisis - 60 (30-74*) min      ISHAN Potter

## 2021-05-11 NOTE — PROGRESS NOTES
Affect flat, mood calm. Patient agreeable to discharge plan. Discharge instructions reviewed with patient including follow-up care plan. Medications: none. Educated to stop taking lithium per MD recommendation, and resume all other home medications as prescribed. Reviewed safety plan and outpatient resources. Denies SI and HI. All belongings that were brought into the hospital have been returned to patient. Escorted off the unit at 1730 accompanied by [facility] staff. Discharged to home with ride from .       Rachel Husain RN

## 2021-05-11 NOTE — ED NOTES
56 year old female received from ED due to . Reports no SI/HI. Denies hallucinations. Patient presents with flat affect, anxious in mood.She becomes more anxious when she has confusion episodes. Patient feels like this unit is shelter and she is not comfortable.  She started having increased symptoms of shakiness and unsteady gait along with more confusion on Friday.  She also has not eaten since Friday and says this is because her eating pattern is not stable. She did not take her meds then until last night but stopped the lithium.  Currently she is walking without assist. She is not shaky and has some word finding difficulty. Nursing and risk assessments completed. Assessments reviewed with [facility]HP and physician. Video monitoring in progress, patient informed.  Admission information reviewed with patient. Patient given a tour of [facility] and instructions on using the facility. Questions regarding [facility] addressed. Pt search completed and belongings inventoried.

## 2021-05-11 NOTE — ED PROVIDER NOTES
"  History   Chief Complaint:  Psychiatric Evaluation    HPI   Radha Gomez is a 56 year old female with history of anxiety, bipolar 1 disorder, borderline personality disorder, and post traumatic stress disorder who presents with psychiatric evaluation. The patient has a chronic history of mental health issues and confusion dating back to the fall of 2020. On 3/30/2021 she had an MRI of her brain done which was unremarkably of any acute disease. The patient reports has been on several mood stabilizers and was most recently put on the mood stabilizer lithium a couple of weeks ago. This past weekend she began feeling increasingly confused and \"foggy\", citing that she is unable to drive well and can not dial her phone. This weekend she developed tremors and stopped taking her lithium on Saturday (5/8/2021). Her  reports that the patient was slurring words and developed some abdominal pain which prevented her from eating. This abdominal pain is still present here in the ED. She also notes having a cough for the past 2 days.The patient notes seeing a therapist once every three weeks. She also notes she has not taken any of her medications for the past 3 days. She denies suicidal ideation, drug use, hallucinations, falls, and recent trauma. Of note, her and her  have received their COVID vaccinations.    MRI Brain 3/30/2021  1. No evidence of acute infarct.    2. Moderate nonspecific T2 and FLAIR hyperintense foci in the cerebral white matter and lakesha, likely secondary to chronic small vessel ischemic changes.     3. Mild cerebral volume loss.    4. 1.1 x 0.9 x 0.9 cm enhancing partially calcified extra-axial dural based mass along the superior left frontal falx likely representing a benign meningioma. No perilesional brain edema.       Review of Systems   Respiratory: Positive for cough.    Gastrointestinal: Positive for abdominal pain.   Neurological: Positive for tremors and speech difficulty. " "  Psychiatric/Behavioral: Positive for confusion. Negative for hallucinations and suicidal ideas. The patient is nervous/anxious.    All other systems reviewed and are negative.        Allergies:  Abilify   Paxil   Contrast Dye  Ditropan   Wellbutrin    Aripiprazole  Vortioxetine  Oxybutynin    Medications:    Xanax  Augmentin  Aspirin, 81 mg  Lipitor  Buspar  Lasix  Aldactone  Lamictal  Trulicity  Glycate  Norco  Latuda  Glucophage  Methadone  Omeprazole  Pyridium  Minipress  Inderal  Seroquel  Zantac  Zoloft  Effexor     Past Medical History:    Anxiety  Arthritis  Bipolar 1 disorder  Borderline personality disorder  Coronary artery disease  Depression  Hyperlipidemia  Hypertension  Interstitial cystitis  Osteoarthritis  PTSD  Diabetes mellitus type II     Past Surgical History:    Laproscopy x3  Bladder biopsy  EGD  Dental extractions  Venereal wart removal  Left heart catheter  Incision & drainage, mandible abscess  Cholecystectomy  Orthopedic surgery      Family History:    Depression  Anxiety  Substance abuse  Dementia  CAD  Diabetes  Heart failure  Suicide  Mental illness    Social History:  The patient presents with her   The patient does not use street drugs.  Physical Exam     Patient Vitals for the past 24 hrs:   BP Temp Temp src Pulse Resp SpO2 Height Weight   05/11/21 1455 (!) 160/90 98.2  F (36.8  C) Oral 60 16 97 % -- --   05/11/21 1016 (!) 149/90 97.4  F (36.3  C) Temporal 105 14 96 % 1.6 m (5' 3\") 93.9 kg (207 lb)       Physical Exam  GENERAL: well developed, pleasant  HEAD: atraumatic  EYES: pupils reactive, extraocular muscles intact, conjunctivae normal  ENT:  mucus membranes moist  NECK:  trachea midline, normal range of motion  RESPIRATORY: no tachypnea, breath sounds clear to auscultation   CVS: normal S1/S2, no murmurs, intact distal pulses  ABDOMEN: soft, lower abdominal pain central and right lower quadrant, nondistention  MUSCULOSKELETAL: no deformities  SKIN: warm and dry, no acute " rashes or ulceration  NEURO: GCS 15, cranial nerves intact, alert and oriented x3  PSYCH:  Mood/affect normal  Emergency Department Course   Imaging:    X-ray Chest Port, 1 view:  No pleural fluid or pneumothorax. No airspace disease or   edema. There is a newly identified rounded 8 mm opacity over the left   apex. This was not seen on the comparison chest CT from 5 months   earlier. Chest CT is recommended. Normal size of the heart.   Result per radiology.     CT-scan Abdomen/Pelvis w/o contrast:  1.  No colonic diverticulosis or diverticulitis.   2.  Mild stranding adjacent to the ascending colon with mild adjacent   stranding. Colitis is possible. Trace free fluid in the pelvis may be   related.   3.  Normal appendix.   4.  Reticular interstitial opacities at the lung bases could be edema.   Result per radiology    Laboratory:    CBC: WBC: 19.1 (H), HGB: 13.9, PLT: 384  CMP: Glucose 112 (H), Potassium: 3.1 (L), Calcium: 10.4 (H), Albumin: 3.3 (L), Alkaline Phosphatase: 170 (H), o/w WNL (Creatinine: 1.00)  Lithium Level: 1.19  UA: Bacteria: Few, Squamous Epithelial: 2 (H), Mucous: Present, Hyaline Casts: 22 (H), Amorphous Crystals: Few, o/w Negative  Asymptomatic COVID19 Virus PCR by nasopharyngeal swab Negative  Blood cultures pending x2    Emergency Department Course:    Reviewed:    I reviewed the patient's nursing notes, vitals, past medical records, Care Everywhere.     Assessments:    1037: I performed an exam of the patient and obtained history, as documented above.    1410: I rechecked the patient and updated them on findings. They are amenable to transfer to [facility].       Consults:   1411: I spoke with Psychiatrist Dr. Rashid regarding the patient.    Disposition:  The patient was transferred to [facility].       Impression & Plan   Medical Decision Making:  Patient presents with recent concerns for cognitive issues as well as recently starting lithium to help with mood stabilizer for the last 2 to 3 weeks  and was feeling like she may be having side effects.  This over the weekend.  She also had some mild abdominal pain and diarrhea that is improved.  Patient has had some cognitive issues for some time and is had an MRI in March that was negative.  Certainly could be medication driven but basic labs show leukocytosis.  Given the leukocytosis and reports of abdominal pain still has some mild abdominal pain on exam did a CT that showed some mild stranding that could be colitis which would be supportive care.  Leukocytosis could be resulting from this versus lithium.  I did speak with psychiatry noted that the lithium can cause leukocytosis.  Patient is not suicidal or homicidal.  She is here with her .  Of her work-up from an infectious standpoint is negative.  It is negative.  Patient is comfortable going over to [facility] for ongoing psychiatric evaluation.  She has tried other mood stabilizers with mixed effects.  Feel she is medically cleared from the leukocytosis and her symptoms of fogginess are still due to underlying mental health related issues.    Covid-19  Radha Gomez was evaluated during a global COVID-19 pandemic, which necessitated consideration that the patient might be at risk for infection with the SARS-CoV-2 virus that causes COVID-19.   Applicable protocols for evaluation were followed during the patient's care.   COVID-19 was considered as part of the patient's evaluation. The plan for testing is:  a test was obtained during this visit.    Diagnosis:    ICD-10-CM    1. Leukocytosis, unspecified type  D72.829    2. Bipolar affective disorder, current episode mixed, current episode severity unspecified (H)  F31.60        Discharge Medications:  New Prescriptions    No medications on file       Scribe Disclosure:  Steffi UNDERWOOD, am serving as a scribe at 10:18 AM on 5/11/2021 to document services personally performed by Troy Neil MD based on my observations and the provider's  statements to me.          Troy Neil MD  05/11/21 [id number]

## 2021-05-11 NOTE — ED TRIAGE NOTES
Pt states she has had increased confusion after going off lithium last saturday, denies SI.  Pt reports she had been taking lithium for her bipolor for 2 weeks prior to stopping it.

## 2021-05-11 NOTE — ED NOTES
Bed: ED17  Expected date:   Expected time:   Means of arrival:   Comments:  Neftali - Luzma3  crisis eta 1010

## 2021-05-17 LAB
BACTERIA SPEC CULT: NO GROWTH
SPECIMEN SOURCE: NORMAL

## 2021-06-15 DIAGNOSIS — I50.33 ACUTE ON CHRONIC HEART FAILURE WITH PRESERVED EJECTION FRACTION (HFPEF) (H): ICD-10-CM

## 2021-06-15 RX ORDER — SPIRONOLACTONE 25 MG/1
25 TABLET ORAL DAILY
Qty: 90 TABLET | Refills: 0 | Status: SHIPPED | OUTPATIENT
Start: 2021-06-15 | End: 2021-09-16

## 2021-08-23 DIAGNOSIS — I50.30 HEART FAILURE WITH PRESERVED EJECTION FRACTION, NYHA CLASS I (H): ICD-10-CM

## 2021-08-23 RX ORDER — FUROSEMIDE 20 MG
60 TABLET ORAL DAILY
Qty: 270 TABLET | Refills: 1 | Status: SHIPPED | OUTPATIENT
Start: 2021-08-23 | End: 2022-01-04

## 2021-08-29 ENCOUNTER — HEALTH MAINTENANCE LETTER (OUTPATIENT)
Age: 56
End: 2021-08-29

## 2021-09-16 DIAGNOSIS — I50.33 ACUTE ON CHRONIC HEART FAILURE WITH PRESERVED EJECTION FRACTION (HFPEF) (H): ICD-10-CM

## 2021-09-16 RX ORDER — SPIRONOLACTONE 25 MG/1
25 TABLET ORAL DAILY
Qty: 90 TABLET | Refills: 3 | Status: SHIPPED | OUTPATIENT
Start: 2021-09-16 | End: 2022-02-25

## 2021-09-16 NOTE — TELEPHONE ENCOUNTER
Spironolactone 25mg Rx refill request received from [facility] pharmacy in [place]. Pt is overdue for cardiology follow up and BMP that ROD Couch ordered in Feb 2021 when pt was doing well on current medications, including spironolactone 25mg daily.     Rx has been sent to pt's pharmacy as requested and message sent to scheduling to call pt to schedule follow up with Dr. Aguilar or ROD Cuoch RN 4:35 PM 9/16/2021

## 2021-10-24 ENCOUNTER — HEALTH MAINTENANCE LETTER (OUTPATIENT)
Age: 56
End: 2021-10-24

## 2021-12-19 ENCOUNTER — HEALTH MAINTENANCE LETTER (OUTPATIENT)
Age: 56
End: 2021-12-19

## 2022-01-04 ENCOUNTER — TELEPHONE (OUTPATIENT)
Dept: CARDIOLOGY | Facility: CLINIC | Age: 57
End: 2022-01-04
Payer: COMMERCIAL

## 2022-01-04 DIAGNOSIS — I50.30 HEART FAILURE WITH PRESERVED EJECTION FRACTION, NYHA CLASS I (H): ICD-10-CM

## 2022-01-04 RX ORDER — FUROSEMIDE 20 MG
60 TABLET ORAL DAILY
Qty: 270 TABLET | Refills: 0 | Status: SHIPPED | OUTPATIENT
Start: 2022-01-04 | End: 2022-06-07

## 2022-01-04 NOTE — TELEPHONE ENCOUNTER
Pt called to request lab orders for Dr Aguilar visit 2/3/22 be faxed to her PCP in [facility]. Pt has orders for a BMP. Message to in-clinic RN to send. Pt updated.

## 2022-01-11 NOTE — CONSULTS
ORAL & MAXILLOFACIAL SURGERY CONSULTATION  Name: Radha Gomez  MRN: [id number]  : 1965  Date of Service: 2019      ASSESSMENT:  54 year old female with right mandibular cellulitis vs. abscess. Periapical abscess tooth #30 and retained root #28.     RECOMMENDATIONS:  - To OR for extraction of #30 and #28, incision and drainage, right mandible. No current airway compromise, fever, or leukocytosis suggestive of systemic disease, will continue to monitor.    - Pain control, appreciate Hospitalist management given chronic pain needs.    - Continue IV unasyn while inpatient, follow cultures and sensitivities    - Monitor airway, continuous pulse oximetry    - Post-operatively, may advance to soft diet as tolerated.     - Gauze pack to right mandibular surgical site with pressure for 30 minutes if any new bleeding.     - Continue penrose drain in place for 2-3 days post-op. OMS to remove.     Thank you for your consultation. OMS will continue to follow.  Anticipate discharge in ~2 days pending airway stability and pain management.    Rachid Pérez, PRAKASH  [facility]  [address] [place], [place] [address], [address]  [place] [address]  Clinic/On-call Phone: [phone]  Clinic Fax: [phone]          CHIEF COMPLAINT:    The right side of my face is swollen.    HISTORY OF PRESENT ILLNESS:      54 year old female with a history of anxiety, chronic pain management (on methadone), bipolar disorder, CAD, HTN, PTSD who presents with 4 days of right mandibular pain and 2 days of right mandibular swelling. Patient seen with DDS yesterday, referred to ER and started on clindamycin. Patient presented today with increasing swelling. Unasyn administered in ED today. Afebrile, no dysphagia or odynophagia, no trismus. No nausea/vomiting, chest pain, or shortness of breath. Patient denies paresthesia of the lip/chin. Swelling extending below jaw and into neck per patient. Patient points to tooth #30 as the  "\"painful tooth.\"        PAST MEDICAL HISTORY:  Past Medical History:   Diagnosis Date     Anxiety      Arthritis      Back injury      Bipolar 1 disorder (H)      Borderline personality disorder (H)      Chest pain      Coronary artery disease     non-obstructive, per cath 4/2016     Depressive disorder      HLD (hyperlipidemia)      HTN (hypertension)     mild     IC (interstitial cystitis) age 21    feels like a  bladder infection- very painful; symptoms since 15     Osteoarthritis      Palpitations      PTSD (post-traumatic stress disorder)        PAST SURGICAL HISTORY:  Past Surgical History:   Procedure Laterality Date     ABDOMEN SURGERY      3 laproscopies     BIOPSY      bladder     COLONOSCOPY  age 47     ESOPHAGOSCOPY, GASTROSCOPY, DUODENOSCOPY (EGD), COMBINED  6/24/2013    Procedure: COMBINED ESOPHAGOSCOPY, GASTROSCOPY, DUODENOSCOPY (EGD), BIOPSY SINGLE OR MULTIPLE;  gastroscopy;  Surgeon: Nathalie Cotter MD;  Location:  GI     GYN SURGERY      venereal warts removed     HC LEFT HEART CATHETERIZATION  4/15/16    non-obstructive CAD     LAPAROSCOPIC CHOLECYSTECTOMY  6/25/2013    Procedure: LAPAROSCOPIC CHOLECYSTECTOMY;  LAPAROSCOPIC CHOLECYSTECTOMY.;  Surgeon: Salomón Wall MD;  Location:  OR     laporoscopy       ORTHOPEDIC SURGERY         PTA MEDICATIONS:  Current Facility-Administered Medications   Medication     [Auto Hold] acetaminophen (TYLENOL) Suppository 650 mg     [Auto Hold] acetaminophen (TYLENOL) tablet 650 mg     [Auto Hold] ampicillin-sulbactam (UNASYN) 3 g vial to attach to  mL bag     [Auto Hold] bisacodyl (DULCOLAX) Suppository 10 mg     chlorhexidine (PERIDEX) 0.12 % solution 10 mL     [Auto Hold] HYDROmorphone (PF) (DILAUDID) injection 0.3-0.5 mg     [Auto Hold] labetalol (NORMODYNE/TRANDATE) injection 10 mg     [Auto Hold] lidocaine (LMX4) cream     [Auto Hold] lidocaine 1 % 0.1-1 mL     [Auto Hold] magnesium sulfate 4 g in 100 mL sterile water (premade)     [Auto " Hold] melatonin tablet 1 mg     [Auto Hold] naloxone (NARCAN) injection 0.1-0.4 mg     [Auto Hold] ondansetron (ZOFRAN-ODT) ODT tab 4 mg    Or     [Auto Hold] ondansetron (ZOFRAN) injection 4 mg     Patient RECEIVING antibiotic to treat a different condition and it provides ADEQUATE COVERAGE for this surgical procedure.     [Auto Hold] polyethylene glycol (MIRALAX/GLYCOLAX) Packet 17 g     [Auto Hold] potassium chloride (KLOR-CON) Packet 20-40 mEq     [Auto Hold] potassium chloride 10 mEq in 100 mL intermittent infusion with 10 mg lidocaine     [Auto Hold] potassium chloride 10 mEq in 100 mL sterile water intermittent infusion (premix)     [Auto Hold] potassium chloride 20 mEq in 50 mL intermittent infusion     [Auto Hold] potassium chloride ER (K-DUR/KLOR-CON M) CR tablet 20-40 mEq     [Auto Hold] senna-docusate (SENOKOT-S/PERICOLACE) 8.6-50 MG per tablet 1 tablet    Or     [Auto Hold] senna-docusate (SENOKOT-S/PERICOLACE) 8.6-50 MG per tablet 2 tablet     [Auto Hold] sodium chloride (PF) 0.9% PF flush 3 mL     [Auto Hold] sodium chloride (PF) 0.9% PF flush 3 mL     sodium chloride 0.9% infusion                  ALLERGIES:     Allergies   Allergen Reactions     Abilify [Aripiprazole] Cramps     Total body- couldn't walk     Paxil [Paroxetine] Other (See Comments)     Total body pain     Contrast Dye Swelling     Ditropan [Oxybutynin Chloride]      Can't Urinate, Per Pt.     Wellbutrin [Bupropion] Palpitations     If not XL        FAMILY HISTORY:  Family History   Problem Relation Age of Onset     Depression Mother      Anxiety Disorder Mother      Substance Abuse Mother      Dementia Mother      Mental Illness Mother         sexually abused by step father      Coronary Artery Disease Mother      Diabetes Mother      Depression Father      Substance Abuse Father      Mental Illness Father         [employer] vet- blamed others, angry     Heart Failure Father      Dementia Maternal Grandmother      Suicide Sister 36         OD     Mental Illness Sister         DID, neglected, abused and in foster homes     Bipolar Disorder Son      Depression Son      Mental Illness Son         ODD     Suicide Daughter 15        attempted X2, OD and cutting; OD     Depression Daughter      Anxiety Disorder Daughter      Mental Illness Daughter 12        trichitillimania. PTSD     Depression Sister      Anxiety Disorder Sister      Depression Sister      Anxiety Disorder Sister      Suicide Sister 38        post partum- lost the son-     Substance Abuse Sister        SOCIAL HISTORY    Social History     Socioeconomic History     Marital status:      Spouse name: Not on file     Number of children: Not on file     Years of education: Not on file     Highest education level: Not on file   Occupational History     Not on file   Social Needs     Financial resource strain: Not on file     Food insecurity:     Worry: Not on file     Inability: Not on file     Transportation needs:     Medical: Not on file     Non-medical: Not on file   Tobacco Use     Smoking status: Former Smoker     Packs/day: 1.00     Years: 35.00     Pack years: 35.00     Types: Cigarettes     Last attempt to quit: 2012     Years since quittin.1     Smokeless tobacco: Never Used   Substance and Sexual Activity     Alcohol use: No     Drug use: No     Sexual activity: Yes     Partners: Male     Birth control/protection: Surgical   Lifestyle     Physical activity:     Days per week: Not on file     Minutes per session: Not on file     Stress: Not on file   Relationships     Social connections:     Talks on phone: Not on file     Gets together: Not on file     Attends [employer] service: Not on file     Active member of club or organization: Not on file     Attends meetings of clubs or organizations: Not on file     Relationship status: Not on file     Intimate partner violence:     Fear of current or ex partner: Not on file     Emotionally abused: Not on file      Physically abused: Not on file     Forced sexual activity: Not on file   Other Topics Concern     Parent/sibling w/ CABG, MI or angioplasty before 65F 55M? Not Asked      Service Not Asked     Blood Transfusions Not Asked     Caffeine Concern No     Comment: minimal.      Occupational Exposure Not Asked     Hobby Hazards Not Asked     Sleep Concern Not Asked     Stress Concern Not Asked     Weight Concern Not Asked     Special Diet Not Asked     Back Care Not Asked     Exercise No     Bike Helmet Not Asked     Seat Belt Not Asked     Self-Exams Not Asked   Social History Narrative     Not on file       REVIEW OF SYSTEMS: As noted in the HPI      OBJECTIVE/PHYSICAL EXAMINATION:  Vitals: Blood pressure 130/73, pulse 65, temperature 97.4  F (36.3  C), temperature source Oral, resp. rate 18, weight 106.9 kg (235 lb 10.8 oz), SpO2 96 %.  Constitutional: Lying in bed, Uncomfortable, mild distress.  HEENT: Right buccal and submandibular edema, overlying skin erythema.       Ears: External ears are normal.       Eyes: Pupils equal round and reactive to light, Extraocular eye movement intact      Nose: External alae are normal      Mouth:   Right mandibular buccal vestibular swelling. Tender, no fluctuance. No drainage. Tooth #30 exquisitely tender to palpation/percussion.  The dentition is in fair repair. Retained root tip #28.  Occlusion is stable and reproducible. No intraoral lacerations. Floor of mouth soft nontender, nondistended, mandibular torus present bilaterally.  Tongue is non elevated, Uvula is midline, no deviation. No lateral pharyngeal swelling. Oral Hygiene is good, Maximum interincisal opening is >45mm. Tolerating secretions.   Neck: Right submandibular swelling, extending into the superior neck, no induration into neck region.   Cardiovascular: Regular rate and rhythm  Pulmonary: Non-labored on room air.   GI: deferred  : deferred  Musculoskeletal: deferred  Neurologic: AOx3, EOMI, PERRL, facial  "sensation/movement symmetric (V2/V3), hearing intact to finger rub bilaterally, uvula midline, tongue midline on protrusion.    LABORATORY, PATHOLOGY, AND RADIOLOGY DATA:  Lab results:   CBC RESULTS:   Recent Labs   Lab Test 08/30/19  1638   WBC 8.2   RBC 3.73*   HGB 11.0*   HCT 32.8*   MCV 88   MCH 29.5   MCHC 33.5   RDW 14.0          Last Basic Metabolic Panel:  Lab Results   Component Value Date     08/30/2019      Lab Results   Component Value Date    POTASSIUM 4.5 08/30/2019     Lab Results   Component Value Date    CHLORIDE 111 08/30/2019     Lab Results   Component Value Date    MALACHI 8.5 08/30/2019     Lab Results   Component Value Date    CO2 25 08/30/2019     Lab Results   Component Value Date    BUN 17 08/30/2019     Lab Results   Component Value Date    CR 1.12 08/30/2019     Lab Results   Component Value Date     08/30/2019            Imaging:  CT reviewed independently. Periapical lucency teeth #14,30. Retained root tip #28. Soft tissue swelling right buccal, submandibular regions. See formal report.       \"IMPRESSION:  1. Tooth #30 periapical abscess with overlying inflammatory change  extending into the right neck, consistent with cellulitis. No evidence  of drainable soft tissue abscess.  2. Tooth #14 periapical abscess.  3. Right level Ib lymphadenopathy, likely reactive.     OLLIE CHURCHILL MD\"      SIGNATURE:  Rachid Pérez DDS  [facility]  [address], [address]  [place] [address]  Clinic/On-call Phone: [phone]  Clinic Fax: [phone]      " SSKI Counseling:  I discussed with the patient the risks of SSKI including but not limited to thyroid abnormalities, metallic taste, GI upset, fever, headache, acne, arthralgias, paraesthesias, lymphadenopathy, easy bleeding, arrhythmias, and allergic reaction.

## 2022-01-13 ENCOUNTER — TELEPHONE (OUTPATIENT)
Dept: CARDIOLOGY | Facility: CLINIC | Age: 57
End: 2022-01-13
Payer: COMMERCIAL

## 2022-01-13 NOTE — TELEPHONE ENCOUNTER
Received bmp from [facility] lab, drawn for OV 2/3/2022 which was canceled and moved to April.  Cr=1.31 and GFR=48    Called patient who states she has been taking lasix 80mg daily consistently all winter. She is down to 194# and lost 10# in the last week. Patient verified she is also taking the spironolactone 25mg daily.    Patient to see Dr. Aguilar for the first time on 4/19/2022.    Will message CALVIN Jes Miranda to review    Labs sent to scan and for grid entry

## 2022-01-13 NOTE — TELEPHONE ENCOUNTER
Hard to review and make recs as I haven't seen the patient in a year and do not have access to her full BMP at this time. Her creatinine is up from 5/2021 ...Why did she lose 10 lbs in the past week?

## 2022-01-13 NOTE — TELEPHONE ENCOUNTER
Spoke with patient, she states she has a lot of anxiety and tends to go either full-on binge eating or drops to eating very little. Recently she has not been eating much and feels good that she dropped some weight.  Patient states she does not normally have peripheral edema, so does not use that as her sign of fluid overload. She states her breathing is about the same.    Will message CALVIN Jes Miranda with update

## 2022-01-14 NOTE — TELEPHONE ENCOUNTER
Ok. Creat up a little at 1.3 but BUN WNL. Can re-check BMP when she sees Dr. Aguilar in clinic. No changes in the interim.       1510 called patient with update. She states she is going to be seeing a nephrologist at [facility] this month. If no labs done in April by her other doctors then can arrange to do bmp for Dr. Aguilar.

## 2022-02-13 ENCOUNTER — HEALTH MAINTENANCE LETTER (OUTPATIENT)
Age: 57
End: 2022-02-13

## 2022-02-18 ENCOUNTER — TELEPHONE (OUTPATIENT)
Dept: CARDIOLOGY | Facility: CLINIC | Age: 57
End: 2022-02-18
Payer: COMMERCIAL

## 2022-02-18 ENCOUNTER — NURSE TRIAGE (OUTPATIENT)
Dept: CARDIOLOGY | Facility: CLINIC | Age: 57
End: 2022-02-18
Payer: COMMERCIAL

## 2022-02-18 DIAGNOSIS — I50.33 ACUTE ON CHRONIC HEART FAILURE WITH PRESERVED EJECTION FRACTION (HFPEF) (H): Primary | ICD-10-CM

## 2022-02-18 NOTE — TELEPHONE ENCOUNTER
Patient called to report she is having weight gain of 2+ pounds off and on . Today she weighed herself when she got up and it was 172.4 lb. She took 80 mg of Lasix and has peed a lot. Now her weight is 175 lb. She has had it go from 172 to 177 also. This has been going on in the setting of her stoppin gher Methadone and having diarrhea for the past 3 weeks secondary to that. She said she used to only eat once a day but for the past 3 weeks she has been having 3 meals a day: yogurt for breakfast, soup for supper and something small for lunch. She said she has a large Yeti and she fills it with ice, then 1/3 cup of water and the rest orange juice. She drinks 2 of those a day. She is a smoker but she has a new dry cough- not ron a smoker's cough. She denies any swelling, she is a little SOB but she is very weak from the diarrhea. Overall she has lost 23 pounds in the last 4 weeks. She would like a call back regarding the weight gain. I will ask Jes Miranda' nurse team to call her.

## 2022-02-18 NOTE — TELEPHONE ENCOUNTER
Message from triage team to contact the patient.    Called patient to continue the triage discussion about her fluid status.  Patient states her pain clinic provider retired. He had approved her methadone/norco therapy for 23 years. The new provider recommended changing the plan and patient hung up on her and refuses to discuss any further care at that clinic.  Patient states she had been having diarrhea for a couple of weeks. After the disagreement with the pain clinic, she decided to stop both the methadone and norco on her own. Since then the explosive diarrhea has continued and the patient has lost 23 pounds in the last 3 weeks. She believes it could take 2 months for the side effects to decrease.  Patient states now her weight is bouncing daily from 2-5 pounds difference. Today she started at 172, ate yogurt and weighed in again at 175#.  Yesterday she went from 172 to 177 in a few hours.  These rapid changes have distressed the patient and she wants an explanation. She is taking lasix 80mg daily and cannot understand why her weight is not stable.  Patient notes after her shower today she felt SOB.  Patient states she is not ready to see her PCP to discuss the diarrhea. She does not want to use OTC imodium as this makes her constipated. Patient did not want to discuss any GI issues she is having, she states she just wants her weight to balance.    Will message [facility] Jes Miranda to review

## 2022-02-18 NOTE — TELEPHONE ENCOUNTER
Called patient to review recommendation for bmp from CALVIN Jes Miranda. Patient prefers to set this up at her PCP clinic. Order sent to Dr. Parikh's office.

## 2022-02-18 NOTE — TELEPHONE ENCOUNTER
Everyone gains 2-3 lbs from morning to evening. With this type of diarrhea, I would expect to see more dramatic variations in her weights during the day, like she is describing.  I would advise AGAINST her checking her weight multiples times per day. Once daily, first thing in the morning, is more than enough.   With her substantial weight loss, she needs a BMP at this time to ensure her renal function and electrolytes remain in balance. Please have her schedule this as soon as she is able. I will let her know if she needs to make changes to her furosemide based on that.   She should follow up with Dr. Aguilar as scheduled in April (long overdue).  Thank you.

## 2022-02-18 NOTE — TELEPHONE ENCOUNTER
"  Answer Assessment - Initial Assessment Questions  1. REASON FOR CALL or QUESTION: \"What is your reason for calling today?\" or \"How can I best help you?\" or \"What question do you have that I can help answer?\"      Weight gain of greater than 2 pounds today - has been happening off and on    Protocols used: NO PROTOCOL AVAILABLE - INFORMATION ONLY-A-[place]    "

## 2022-02-25 ENCOUNTER — TELEPHONE (OUTPATIENT)
Dept: CARDIOLOGY | Facility: CLINIC | Age: 57
End: 2022-02-25
Payer: COMMERCIAL

## 2022-02-25 DIAGNOSIS — I50.33 ACUTE ON CHRONIC HEART FAILURE WITH PRESERVED EJECTION FRACTION (HFPEF) (H): ICD-10-CM

## 2022-02-25 RX ORDER — SPIRONOLACTONE 25 MG/1
TABLET ORAL
Qty: 1 TABLET | Refills: 0 | COMMUNITY
Start: 2022-02-25 | End: 2022-04-19

## 2022-02-25 NOTE — TELEPHONE ENCOUNTER
Patient called with Jes SIMPSON recommendations and agrees with decreasing the spironolactone to 12.5 mg daily.  And lab repeated in April. Orders placed.  Patient will call with any weight fluctuation or any questions or concerns prior.

## 2022-02-25 NOTE — TELEPHONE ENCOUNTER
Sodium and chloride are slightly low, which is probably related to some dehydration from a combination of her diarrhea and diuretic therapy. I would recommend she reduce the spironolactone from 25 to 12.5 mg daily, and repeat labs when she sees Dr. Aguilar in April. With any more significant weight fluctuations, she should contact us.

## 2022-04-10 ENCOUNTER — HEALTH MAINTENANCE LETTER (OUTPATIENT)
Age: 57
End: 2022-04-10

## 2022-04-18 NOTE — PROGRESS NOTES
HPI and Plan:                 CURRENT MEDICATIONS:  Current Outpatient Medications   Medication Sig Dispense Refill     ALPRAZolam (XANAX) 0.25 MG tablet Take 2 mg by mouth daily as needed for anxiety 2mg per day       aspirin 81 MG EC tablet Take 1 tablet (81 mg) by mouth daily 90 tablet 3     busPIRone HCl (BUSPAR) 30 MG tablet Take by mouth 2 times daily        dulaglutide (TRULICITY) 0.75 MG/0.5ML pen Inject 0.75 mg Subcutaneous every 7 days       furosemide (LASIX) 20 MG tablet Take 3 tablets (60 mg) by mouth daily 270 tablet 0     lamoTRIgine (LAMICTAL) 25 MG tablet 150 mg daily        lurasidone (LATUDA) 60 MG TABS tablet Take 80 mg by mouth daily with food        melatonin 3 MG tablet Take 12 mg by mouth At Bedtime       metFORMIN (GLUCOPHAGE) 1000 MG tablet Take 1,000 mg by mouth 2 times daily (with meals)       methadone (DOLOPHINE) 10 MG tablet Take 10 mg by mouth 2 times daily       phenazopyridine (PYRIDIUM) 200 MG tablet Take 200 mg by mouth 3 times daily as needed for irritation       propranolol ER (INDERAL LA) 60 MG 24 hr capsule Take 1 capsule (60 mg) by mouth daily 90 capsule 3     QUEtiapine (SEROQUEL) 300 MG tablet Take 300 mg by mouth every evening       spironolactone (ALDACTONE) 25 MG tablet Take 1/2 tablet ( 12.5 mg ) spironolactone daily. 1 tablet 0     venlafaxine (EFFEXOR-XR) 150 MG 24 hr capsule Take 300 mg by mouth daily         ALLERGIES     Allergies   Allergen Reactions     Contrast Dye Swelling     Tongue swelling per pt report  Occurred in 1990     Abilify [Aripiprazole] Cramps     Total body- couldn't walk     Paxil [Paroxetine] Other (See Comments)     Total body pain     Ditropan [Oxybutynin Chloride]      Can't Urinate, Per Pt.     Ibuprofen      Wellbutrin [Bupropion] Palpitations     If not XL       PAST MEDICAL HISTORY:  Past Medical History:   Diagnosis Date     Anxiety      Arthritis      Back injury      Bipolar 1 disorder (H)      Chest pain      Coronary artery disease      non-obstructive, per cath 4/2016     Depressive disorder      HLD (hyperlipidemia)      IC (interstitial cystitis) age 21    feels like a  bladder infection- very painful; symptoms since 15     Osteoarthritis      Palpitations      PTSD (post-traumatic stress disorder)      Tooth abscess -- S/P 2 teeth extracted 8/30/19 8/30/2019       PAST SURGICAL HISTORY:  Past Surgical History:   Procedure Laterality Date     ABDOMEN SURGERY      3 laproscopies     BIOPSY      bladder     COLONOSCOPY  age 47     CV RIGHT HEART CATH MEASUREMENTS RECORDED N/A 1/28/2021    Procedure: Right Heart Cath;  Surgeon: Dolores Lin MD;  Location:  HEART CARDIAC CATH LAB     ESOPHAGOSCOPY, GASTROSCOPY, DUODENOSCOPY (EGD), COMBINED  6/24/2013    Procedure: COMBINED ESOPHAGOSCOPY, GASTROSCOPY, DUODENOSCOPY (EGD), BIOPSY SINGLE OR MULTIPLE;  gastroscopy;  Surgeon: Nathalie Cotter MD;  Location:  GI     EXTRACTION(S) DENTAL N/A 8/30/2019    Procedure: EXTRACTION, TOOTH #30 AND #28;  Surgeon: Rachid Pérez DDS;  Location:  OR     GYN SURGERY      venereal warts removed     HC LEFT HEART CATHETERIZATION  4/15/16    non-obstructive CAD     INCISION AND DRAINAGE MANDIBLE, COMBINED N/A 8/30/2019    Procedure: INCISION AND DRAINAGE, MANDIBLE ABSCESS;  Surgeon: Rachid Pérez DDS;  Location:  OR     LAPAROSCOPIC CHOLECYSTECTOMY  6/25/2013    Procedure: LAPAROSCOPIC CHOLECYSTECTOMY;  LAPAROSCOPIC CHOLECYSTECTOMY.;  Surgeon: Salomón Wall MD;  Location:  OR     laporoscopy       ORTHOPEDIC SURGERY         FAMILY HISTORY:  Family History   Problem Relation Age of Onset     Depression Mother      Anxiety Disorder Mother      Substance Abuse Mother      Dementia Mother      Mental Illness Mother         sexually abused by step father      Coronary Artery Disease Mother      Diabetes Mother      Depression Father      Substance Abuse Father      Mental Illness Father         [employer] vet- blamed others, angry      Heart Failure Father      Dementia Maternal Grandmother      Suicide Sister 36        OD     Mental Illness Sister         DID, neglected, abused and in foster homes     Emphysema Sister      Bipolar Disorder Son      Depression Son      Mental Illness Son         ODD     Suicide Daughter 15        attempted X2, OD and cutting; OD     Depression Daughter      Anxiety Disorder Daughter      Mental Illness Daughter 12        trichitillimania. PTSD     Depression Sister      Anxiety Disorder Sister      Chronic Obstructive Pulmonary Disease Sister        SOCIAL HISTORY:  Social History     Socioeconomic History     Marital status:    Tobacco Use     Smoking status: Current Every Day Smoker     Packs/day: 1.00     Types: Cigarettes     Start date: 1979     Smokeless tobacco: Never Used     Tobacco comment: QUIT FOR 5 YEARS   Substance and Sexual Activity     Alcohol use: Yes     Comment: 1 BEER PER DAY     Drug use: No     Sexual activity: Yes     Partners: Male     Birth control/protection: Surgical   Other Topics Concern     Caffeine Concern No     Comment: minimal.      Exercise No       Review of Systems:  Skin:          Eyes:         ENT:         Respiratory:          Cardiovascular:         Gastroenterology:        Genitourinary:         Musculoskeletal:         Neurologic:         Psychiatric:         Heme/Lymph/Imm:         Endocrine:           Physical Exam:  Vitals: There were no vitals taken for this visit.    Constitutional:  cooperative, alert and oriented, well developed, well nourished, in no acute distress        Skin:  warm and dry to the touch, no apparent skin lesions or masses noted          Head:  normocephalic, no masses or lesions        Eyes:  pupils equal and round        Lymph:      ENT:  no pallor or cyanosis, dentition good        Neck:  carotid pulses are full and equal bilaterally        Respiratory:  clear to auscultation         Cardiac: regular rhythm                pulses  full and equal                                        GI:  abdomen soft        Extremities and Muscular Skeletal:  no edema              Neurological:  no gross motor deficits        Psych:  Alert and Oriented x 3        CC  No referring provider defined for this encounter.

## 2022-04-19 ENCOUNTER — OFFICE VISIT (OUTPATIENT)
Dept: CARDIOLOGY | Facility: CLINIC | Age: 57
End: 2022-04-19
Payer: COMMERCIAL

## 2022-04-19 VITALS
OXYGEN SATURATION: 97 % | DIASTOLIC BLOOD PRESSURE: 71 MMHG | WEIGHT: 175 LBS | HEART RATE: 68 BPM | HEIGHT: 63 IN | BODY MASS INDEX: 31.01 KG/M2 | SYSTOLIC BLOOD PRESSURE: 106 MMHG

## 2022-04-19 DIAGNOSIS — I50.30 HEART FAILURE WITH PRESERVED EJECTION FRACTION, NYHA CLASS I (H): Primary | ICD-10-CM

## 2022-04-19 PROCEDURE — 99213 OFFICE O/P EST LOW 20 MIN: CPT | Performed by: INTERNAL MEDICINE

## 2022-04-19 RX ORDER — TRIAMCINOLONE ACETONIDE 1 MG/G
CREAM TOPICAL 2 TIMES DAILY
COMMUNITY
Start: 2021-12-01

## 2022-04-19 RX ORDER — HYDROCODONE BITARTRATE AND ACETAMINOPHEN 5; 325 MG/1; MG/1
1-2 TABLET ORAL EVERY 6 HOURS PRN
COMMUNITY
Start: 2022-01-19

## 2022-04-19 RX ORDER — TRAZODONE HYDROCHLORIDE 150 MG/1
150 TABLET ORAL DAILY
COMMUNITY

## 2022-04-19 RX ORDER — ONDANSETRON 4 MG/1
4 TABLET, ORALLY DISINTEGRATING ORAL EVERY 8 HOURS PRN
COMMUNITY
Start: 2021-04-25

## 2022-04-19 RX ORDER — SOLIFENACIN SUCCINATE 5 MG/1
1 TABLET, FILM COATED ORAL DAILY
COMMUNITY
Start: 2021-08-18 | End: 2022-08-18

## 2022-04-19 RX ORDER — MUPIROCIN 20 MG/G
OINTMENT TOPICAL
COMMUNITY
Start: 2021-09-15

## 2022-04-19 NOTE — PROGRESS NOTES
Service Date: 04/19/2022    HISTORY OF PRESENT ILLNESS:  I had the pleasure of seeing Ms. Gomez in followup at the [facility] today.  She is a very pleasant 57-year-old female who I saw as an inpatient at [facility] in 07/2020.  At that time, she was admitted with a tick-borne illness and was found to have hypoxemic respiratory failure with evidence of right ventricular dysfunction on echocardiography.  She also had evidence of acute renal insufficiency as well as transaminitis that was thought to be due to NSAID use.    Of note, she had previously undergone coronary angiography in April of 2016, which demonstrated minimal nonobstructive coronary artery disease.    The patient had subsequently followed with Jes Miranda PA-C, most recently in 02/2021.  She did have a cardiovascular MRI in August of 2020 to follow up on her RV dysfunction noted as an inpatient.  This was essentially normal and specifically right ventricular systolic function was noted to be within normal limits.  She was placed on low-dose furosemide at the time due to evidence of mild recurrent fluid overload.    Due to repeated episodes of weight gain and lower extremity edema, we actually referred her for right heart catheterization on 01/21/2021.  This was essentially normal in that both right and left sided filling pressures were within normal limits.  An echocardiogram prior to the right heart catheterization demonstrated normal right ventricular systolic function with no evidence of pulmonary hypertension.  RV was mildly dilated, however.    Since then, she has done quite well, although in the last few weeks, she has noted vertigo associated with frequent falls.  She denies any syncopal events, but does notice dizziness prior to falling.  She also complains of generalized weakness prior to falling.  Her primary care physician referred her to Physical Therapy and also recommended a decrease in her spironolactone  dosage.  A Zio Patch monitor was also ordered to evaluate for symptomatic arrhythmias.  The patient denies any chest discomfort, palpitations, PND, orthopnea today.  She is still taking 60 mg of furosemide daily as well as Aldactone 12.5 mg daily and propranolol for anxiety.    PHYSICAL EXAMINATION:  Dictated below.    IMPRESSION:    1.  History of probable stress related right ventricular dysfunction, which has normalized based on her most recent echocardiogram and right heart catheterization numbers from .  2.  Frequent falls in the context of vertigo and dizziness.  She has been experiencing chronic diarrhea and there may be an element of dehydration secondary to her diuretic use in this setting.    The patient presents with a history of frequent falls associated with dizziness and vertigo over the past few weeks.  She has also been experiencing chronic diarrhea for which a colonoscopy is planned, and I think in this context, dehydration can certainly be a contributor.  I have asked her to discontinue her Aldactone and to cut her furosemide to 20 mg daily.  We did discuss discontinuation of furosemide entirely, but the patient states that she is concerned about recurrent fluid overload and therefore, we have compromised and are continuing to take low-dose furosemide 20 mg daily.    I will follow up on the results of her Zio Patch monitor and will also obtain an echocardiogram for reassessment of left and right ventricular systolic function.    It was a pleasure seeing her in followup today.    Maggie Aguilar MD        D: 2022   T: 2022   MT: LEXI    Name:     RONNIE PACHECO  MRN:      [id number]        Account:      [id number]   :      1965           Service Date: 2022       Document: [id number]

## 2022-04-19 NOTE — LETTER
4/19/2022    Amanda J. Christ, MD [facility] [place] [address]    [place] [address]    RE: Radha Gomez       Dear Colleague,     I had the pleasure of seeing Radha Gomez in the [facility].  HPI and Plan:                 CURRENT MEDICATIONS:  Current Outpatient Medications   Medication Sig Dispense Refill     ALPRAZolam (XANAX) 0.25 MG tablet Take 2 mg by mouth daily as needed for anxiety 2mg per day       aspirin 81 MG EC tablet Take 1 tablet (81 mg) by mouth daily 90 tablet 3     busPIRone HCl (BUSPAR) 30 MG tablet Take by mouth 2 times daily        dulaglutide (TRULICITY) 0.75 MG/0.5ML pen Inject 0.75 mg Subcutaneous every 7 days       furosemide (LASIX) 20 MG tablet Take 3 tablets (60 mg) by mouth daily 270 tablet 0     lamoTRIgine (LAMICTAL) 25 MG tablet 150 mg daily        lurasidone (LATUDA) 60 MG TABS tablet Take 80 mg by mouth daily with food        melatonin 3 MG tablet Take 12 mg by mouth At Bedtime       metFORMIN (GLUCOPHAGE) 1000 MG tablet Take 1,000 mg by mouth 2 times daily (with meals)       methadone (DOLOPHINE) 10 MG tablet Take 10 mg by mouth 2 times daily       phenazopyridine (PYRIDIUM) 200 MG tablet Take 200 mg by mouth 3 times daily as needed for irritation       propranolol ER (INDERAL LA) 60 MG 24 hr capsule Take 1 capsule (60 mg) by mouth daily 90 capsule 3     QUEtiapine (SEROQUEL) 300 MG tablet Take 300 mg by mouth every evening       spironolactone (ALDACTONE) 25 MG tablet Take 1/2 tablet ( 12.5 mg ) spironolactone daily. 1 tablet 0     venlafaxine (EFFEXOR-XR) 150 MG 24 hr capsule Take 300 mg by mouth daily         ALLERGIES     Allergies   Allergen Reactions     Contrast Dye Swelling     Tongue swelling per pt report  Occurred in 1990     Abilify [Aripiprazole] Cramps     Total body- couldn't walk     Paxil [Paroxetine] Other (See Comments)     Total body pain     Ditropan [Oxybutynin Chloride]      Can't Urinate, Per Pt.      Ibuprofen      Wellbutrin [Bupropion] Palpitations     If not XL       PAST MEDICAL HISTORY:  Past Medical History:   Diagnosis Date     Anxiety      Arthritis      Back injury      Bipolar 1 disorder (H)      Chest pain      Coronary artery disease     non-obstructive, per cath 4/2016     Depressive disorder      HLD (hyperlipidemia)      IC (interstitial cystitis) age 21    feels like a  bladder infection- very painful; symptoms since 15     Osteoarthritis      Palpitations      PTSD (post-traumatic stress disorder)      Tooth abscess -- S/P 2 teeth extracted 8/30/19 8/30/2019       PAST SURGICAL HISTORY:  Past Surgical History:   Procedure Laterality Date     ABDOMEN SURGERY      3 laproscopies     BIOPSY      bladder     COLONOSCOPY  age 47     CV RIGHT HEART CATH MEASUREMENTS RECORDED N/A 1/28/2021    Procedure: Right Heart Cath;  Surgeon: Dolores Lin MD;  Location:  HEART CARDIAC CATH LAB     ESOPHAGOSCOPY, GASTROSCOPY, DUODENOSCOPY (EGD), COMBINED  6/24/2013    Procedure: COMBINED ESOPHAGOSCOPY, GASTROSCOPY, DUODENOSCOPY (EGD), BIOPSY SINGLE OR MULTIPLE;  gastroscopy;  Surgeon: Nathalie Cotter MD;  Location:  GI     EXTRACTION(S) DENTAL N/A 8/30/2019    Procedure: EXTRACTION, TOOTH #30 AND #28;  Surgeon: Rachid Pérez DDS;  Location:  OR     GYN SURGERY      venereal warts removed     HC LEFT HEART CATHETERIZATION  4/15/16    non-obstructive CAD     INCISION AND DRAINAGE MANDIBLE, COMBINED N/A 8/30/2019    Procedure: INCISION AND DRAINAGE, MANDIBLE ABSCESS;  Surgeon: Rachid Pérez DDS;  Location:  OR     LAPAROSCOPIC CHOLECYSTECTOMY  6/25/2013    Procedure: LAPAROSCOPIC CHOLECYSTECTOMY;  LAPAROSCOPIC CHOLECYSTECTOMY.;  Surgeon: Salomón Wall MD;  Location:  OR     laporoscopy       ORTHOPEDIC SURGERY         FAMILY HISTORY:  Family History   Problem Relation Age of Onset     Depression Mother      Anxiety Disorder Mother      Substance Abuse Mother      Dementia  Mother      Mental Illness Mother         sexually abused by step father      Coronary Artery Disease Mother      Diabetes Mother      Depression Father      Substance Abuse Father      Mental Illness Father         [employer] vet- blamed others, angry     Heart Failure Father      Dementia Maternal Grandmother      Suicide Sister 36        OD     Mental Illness Sister         DID, neglected, abused and in foster homes     Emphysema Sister      Bipolar Disorder Son      Depression Son      Mental Illness Son         ODD     Suicide Daughter 15        attempted X2, OD and cutting; OD     Depression Daughter      Anxiety Disorder Daughter      Mental Illness Daughter 12        trichitillimania. PTSD     Depression Sister      Anxiety Disorder Sister      Chronic Obstructive Pulmonary Disease Sister        SOCIAL HISTORY:  Social History     Socioeconomic History     Marital status:    Tobacco Use     Smoking status: Current Every Day Smoker     Packs/day: 1.00     Types: Cigarettes     Start date: 1979     Smokeless tobacco: Never Used     Tobacco comment: QUIT FOR 5 YEARS   Substance and Sexual Activity     Alcohol use: Yes     Comment: 1 BEER PER DAY     Drug use: No     Sexual activity: Yes     Partners: Male     Birth control/protection: Surgical   Other Topics Concern     Caffeine Concern No     Comment: minimal.      Exercise No       Review of Systems:  Skin:          Eyes:         ENT:         Respiratory:          Cardiovascular:         Gastroenterology:        Genitourinary:         Musculoskeletal:         Neurologic:         Psychiatric:         Heme/Lymph/Imm:         Endocrine:           Physical Exam:  Vitals: There were no vitals taken for this visit.    Constitutional:  cooperative, alert and oriented, well developed, well nourished, in no acute distress        Skin:  warm and dry to the touch, no apparent skin lesions or masses noted          Head:  normocephalic, no masses or lesions         Eyes:  pupils equal and round        Lymph:      ENT:  no pallor or cyanosis, dentition good        Neck:  carotid pulses are full and equal bilaterally        Respiratory:  clear to auscultation         Cardiac: regular rhythm                pulses full and equal                                        GI:  abdomen soft        Extremities and Muscular Skeletal:  no edema              Neurological:  no gross motor deficits        Psych:  Alert and Oriented x 3        CC  No referring provider defined for this encounter.    Thank you for allowing me to participate in the care of your patient.      Sincerely,     Maggie Aguilar MD     [facility]

## 2022-05-05 ENCOUNTER — HOSPITAL ENCOUNTER (OUTPATIENT)
Dept: CARDIOLOGY | Facility: CLINIC | Age: 57
Discharge: HOME | End: 2022-05-05
Attending: INTERNAL MEDICINE | Admitting: INTERNAL MEDICINE
Payer: COMMERCIAL

## 2022-05-05 ENCOUNTER — TELEPHONE (OUTPATIENT)
Dept: CARDIOLOGY | Facility: CLINIC | Age: 57
End: 2022-05-05

## 2022-05-05 DIAGNOSIS — I50.30 HEART FAILURE WITH PRESERVED EJECTION FRACTION, NYHA CLASS I (H): ICD-10-CM

## 2022-05-05 LAB — LVEF ECHO: NORMAL

## 2022-05-05 PROCEDURE — 93306 TTE W/DOPPLER COMPLETE: CPT | Mod: 26 | Performed by: INTERNAL MEDICINE

## 2022-05-05 PROCEDURE — 93306 TTE W/DOPPLER COMPLETE: CPT

## 2022-05-05 NOTE — TELEPHONE ENCOUNTER
Echo completed today as below, ordered by Dr Aguilar at [facility] 4/19 for reassessment of left and right ventricular systolic function. Routed to provider to review. Note also mentions getting a ziopatch for dizziness but no order/is not pending in results.     Left ventricular size, global systolic function, and wall motion are normal,  estimated LVEF 60-65%.  Right ventricle is mildly dilated relative to LV size. The right ventricular  systolic function is normal.  No significant valvular abnormalities.  This study was compared to a TTE from 12/31/2020. There has been no  significant change.

## 2022-05-06 NOTE — TELEPHONE ENCOUNTER
"Spoke to patient, states she already wore a heart monitor for 2wks that was ordered by her PCP, returned it 2wks ago and has not gotten her results yet. Per [facility], radha summary/interpretation from 4/28 is available as below. Routed to Dr Aguilar for review. Full report requested from PCP.     \"Summary:   1. Very frequent symptoms reported (> 20)   2. Symptoms mostly occurred in association with sinus rhythm without   ectopy; rare/occasional PACs or PVCs were present with some.   3. Zio is unremarkable   Signature: Samm Steele MD     Indication: (R29.6) Repeated falls     Enrollment Period: 14 days 0 hours   04/06/22,06:19pm to 04/20/22, 05:54pm     Analysis Time (after artifact removed): 12 days 15 hours     Total Triggers: 19   Total Diaries: 15     Findings within +/- 45 sec of triggered events or diary entries: Sinus   59-90 bpm, SVE(s), VE(s)     Longest Ventricular Bigeminy Episode: 0 s   Longest Ventricular Trigeminy Episode: 0 s     Findings:   Patient had a min HR of 49 bpm, max sinus HR of 120 bpm, and avg HR of 65   bpm. Predominant underlying rhythm was Sinus  Rhythm.     3 Supraventricular Tachycardia runs occurred, the run with the fastest   interval lasting 14 beats with a max rate of 129 bpm  (avg 121 bpm); the   run   with the fastest interval was also the longest.     Isolated SVEs were rare (<1.0%), SVE Couplets were rare (<1.0%), and SVE   Triplets were rare (<1.0%).     Isolated VEs were rare (<1.0%), and no VE Couplets or VE Triplets were   present. \"     "

## 2022-05-06 NOTE — TELEPHONE ENCOUNTER
No significant abnormalities on echo and Zio patch. Is she feeling any better since her diuretics were decreased? Thanks.

## 2022-05-10 ENCOUNTER — MYC MEDICAL ADVICE (OUTPATIENT)
Dept: CARDIOLOGY | Facility: CLINIC | Age: 57
End: 2022-05-10
Payer: COMMERCIAL

## 2022-05-10 NOTE — TELEPHONE ENCOUNTER
Attempted to call patient, message left that she can return the call or send an update on My Chart.     My Chart message:  Good Morning Ms. Gomez,    Dr. Aguilar has reviewed your test results.  Dr. Aguilar's reply is - No significant abnormalities on echo and Zio patch. Should be available for review in My Chart.     Are you feeling any better since her diuretics were decreased?  Please reply with update either with phone call or My Chart messages addressed to Dr. Aguilar.    Thank you,  Team 2 Nurses.

## 2022-05-11 NOTE — TELEPHONE ENCOUNTER
My Chart message received 5-11-22  No, not feeling better.  Still diarrhea and dizziness,  unstable as well.

## 2022-05-11 NOTE — TELEPHONE ENCOUNTER
I think she should try holding the furosemide completely while the diarrhea is still ongoing. Otherwise I think continuing the GI workup is appropriate. Thanks.     My Chart message reply sent to Patient.

## 2022-06-07 ENCOUNTER — TELEPHONE (OUTPATIENT)
Dept: CARDIOLOGY | Facility: CLINIC | Age: 57
End: 2022-06-07
Payer: COMMERCIAL

## 2022-06-07 DIAGNOSIS — I50.30 HEART FAILURE WITH PRESERVED EJECTION FRACTION, NYHA CLASS I (H): ICD-10-CM

## 2022-06-07 RX ORDER — FUROSEMIDE 20 MG
60 TABLET ORAL DAILY
Qty: 1 TABLET | Refills: 0 | COMMUNITY
Start: 2022-06-07

## 2022-06-07 NOTE — TELEPHONE ENCOUNTER
Message from patient: she has been on vacation, so did not respond to previous calls. She states she is still having the same issues with diarrhea and dizziness. She sees a GI specialist on Friday and neurology next week. She sees a neuropsych person in September.    10:30 called patient back to review Dr. Aguilar's last recommendation on 5/11/2022 to hold her lasix while she has the diarrhea. Patient states she missed that message and has still been losing weight, so will stop the lasix for now.

## 2022-07-31 ENCOUNTER — HEALTH MAINTENANCE LETTER (OUTPATIENT)
Age: 57
End: 2022-07-31

## 2022-10-15 ENCOUNTER — HEALTH MAINTENANCE LETTER (OUTPATIENT)
Age: 57
End: 2022-10-15

## 2022-10-24 ENCOUNTER — HOSPITAL ENCOUNTER (EMERGENCY)
Facility: CLINIC | Age: 57
Discharge: HOME | End: 2022-10-24
Attending: EMERGENCY MEDICINE | Admitting: EMERGENCY MEDICINE
Payer: COMMERCIAL

## 2022-10-24 VITALS
OXYGEN SATURATION: 96 % | SYSTOLIC BLOOD PRESSURE: 157 MMHG | HEART RATE: 64 BPM | RESPIRATION RATE: 20 BRPM | WEIGHT: 180 LBS | TEMPERATURE: 97.4 F | BODY MASS INDEX: 31.89 KG/M2 | DIASTOLIC BLOOD PRESSURE: 88 MMHG

## 2022-10-24 DIAGNOSIS — R51.9 ACUTE NONINTRACTABLE HEADACHE, UNSPECIFIED HEADACHE TYPE: ICD-10-CM

## 2022-10-24 DIAGNOSIS — R42 DIZZINESS: ICD-10-CM

## 2022-10-24 LAB
ALBUMIN SERPL-MCNC: 3.7 G/DL (ref 3.4–5)
ALP SERPL-CCNC: 115 U/L (ref 40–150)
ALT SERPL W P-5'-P-CCNC: 76 U/L (ref 0–50)
ANION GAP SERPL CALCULATED.3IONS-SCNC: 3 MMOL/L (ref 3–14)
AST SERPL W P-5'-P-CCNC: 33 U/L (ref 0–45)
BASOPHILS # BLD AUTO: 0.1 10E3/UL (ref 0–0.2)
BASOPHILS NFR BLD AUTO: 1 %
BILIRUB SERPL-MCNC: 0.2 MG/DL (ref 0.2–1.3)
BUN SERPL-MCNC: 16 MG/DL (ref 7–30)
CALCIUM SERPL-MCNC: 9.2 MG/DL (ref 8.5–10.1)
CHLORIDE BLD-SCNC: 105 MMOL/L (ref 94–109)
CO2 SERPL-SCNC: 26 MMOL/L (ref 20–32)
CREAT SERPL-MCNC: 1.04 MG/DL (ref 0.52–1.04)
EOSINOPHIL # BLD AUTO: 0.1 10E3/UL (ref 0–0.7)
EOSINOPHIL NFR BLD AUTO: 1 %
ERYTHROCYTE [DISTWIDTH] IN BLOOD BY AUTOMATED COUNT: 11.9 % (ref 10–15)
GFR SERPL CREATININE-BSD FRML MDRD: 62 ML/MIN/1.73M2
GLUCOSE BLD-MCNC: 102 MG/DL (ref 70–99)
HCT VFR BLD AUTO: 38.6 % (ref 35–47)
HGB BLD-MCNC: 12.3 G/DL (ref 11.7–15.7)
IMM GRANULOCYTES # BLD: 0 10E3/UL
IMM GRANULOCYTES NFR BLD: 0 %
LYMPHOCYTES # BLD AUTO: 3.3 10E3/UL (ref 0.8–5.3)
LYMPHOCYTES NFR BLD AUTO: 33 %
MCH RBC QN AUTO: 30.3 PG (ref 26.5–33)
MCHC RBC AUTO-ENTMCNC: 31.9 G/DL (ref 31.5–36.5)
MCV RBC AUTO: 95 FL (ref 78–100)
MONOCYTES # BLD AUTO: 0.7 10E3/UL (ref 0–1.3)
MONOCYTES NFR BLD AUTO: 7 %
NEUTROPHILS # BLD AUTO: 5.9 10E3/UL (ref 1.6–8.3)
NEUTROPHILS NFR BLD AUTO: 58 %
NRBC # BLD AUTO: 0 10E3/UL
NRBC BLD AUTO-RTO: 0 /100
PLATELET # BLD AUTO: 193 10E3/UL (ref 150–450)
POTASSIUM BLD-SCNC: 4.7 MMOL/L (ref 3.4–5.3)
PROT SERPL-MCNC: 7.6 G/DL (ref 6.8–8.8)
RBC # BLD AUTO: 4.06 10E6/UL (ref 3.8–5.2)
SODIUM SERPL-SCNC: 134 MMOL/L (ref 133–144)
WBC # BLD AUTO: 10.1 10E3/UL (ref 4–11)

## 2022-10-24 PROCEDURE — 250N000011 HC RX IP 250 OP 636: Performed by: EMERGENCY MEDICINE

## 2022-10-24 PROCEDURE — 80053 COMPREHEN METABOLIC PANEL: CPT | Performed by: EMERGENCY MEDICINE

## 2022-10-24 PROCEDURE — 85025 COMPLETE CBC W/AUTO DIFF WBC: CPT | Performed by: EMERGENCY MEDICINE

## 2022-10-24 PROCEDURE — 99284 EMERGENCY DEPT VISIT MOD MDM: CPT | Mod: 25

## 2022-10-24 PROCEDURE — 36415 COLL VENOUS BLD VENIPUNCTURE: CPT | Performed by: EMERGENCY MEDICINE

## 2022-10-24 PROCEDURE — 250N000013 HC RX MED GY IP 250 OP 250 PS 637: Performed by: EMERGENCY MEDICINE

## 2022-10-24 PROCEDURE — 258N000003 HC RX IP 258 OP 636: Performed by: EMERGENCY MEDICINE

## 2022-10-24 PROCEDURE — 96375 TX/PRO/DX INJ NEW DRUG ADDON: CPT

## 2022-10-24 PROCEDURE — 96374 THER/PROPH/DIAG INJ IV PUSH: CPT

## 2022-10-24 PROCEDURE — 96361 HYDRATE IV INFUSION ADD-ON: CPT

## 2022-10-24 PROCEDURE — 82040 ASSAY OF SERUM ALBUMIN: CPT | Performed by: EMERGENCY MEDICINE

## 2022-10-24 RX ORDER — MECLIZINE HYDROCHLORIDE 25 MG/1
25 TABLET ORAL ONCE
Status: COMPLETED | OUTPATIENT
Start: 2022-10-24 | End: 2022-10-24

## 2022-10-24 RX ORDER — METOCLOPRAMIDE HYDROCHLORIDE 5 MG/ML
10 INJECTION INTRAMUSCULAR; INTRAVENOUS ONCE
Status: COMPLETED | OUTPATIENT
Start: 2022-10-24 | End: 2022-10-24

## 2022-10-24 RX ORDER — DIPHENHYDRAMINE HYDROCHLORIDE 50 MG/ML
25 INJECTION INTRAMUSCULAR; INTRAVENOUS ONCE
Status: COMPLETED | OUTPATIENT
Start: 2022-10-24 | End: 2022-10-24

## 2022-10-24 RX ADMIN — MECLIZINE HYDROCHLORIDE 25 MG: 25 TABLET ORAL at 12:11

## 2022-10-24 RX ADMIN — DIPHENHYDRAMINE HYDROCHLORIDE 25 MG: 50 INJECTION, SOLUTION INTRAMUSCULAR; INTRAVENOUS at 14:04

## 2022-10-24 RX ADMIN — SODIUM CHLORIDE 500 ML: 9 INJECTION, SOLUTION INTRAVENOUS at 14:03

## 2022-10-24 RX ADMIN — METOCLOPRAMIDE 10 MG: 5 INJECTION, SOLUTION INTRAMUSCULAR; INTRAVENOUS at 14:06

## 2022-10-24 ASSESSMENT — ENCOUNTER SYMPTOMS
HEADACHES: 1
NUMBNESS: 0
DIZZINESS: 1
WEAKNESS: 0
ABDOMINAL PAIN: 0
FEVER: 0
SHORTNESS OF BREATH: 0

## 2022-10-24 ASSESSMENT — ACTIVITIES OF DAILY LIVING (ADL): ADLS_ACUITY_SCORE: 35

## 2022-10-24 NOTE — ED TRIAGE NOTES
Patient woke up this morning and said that this is a horrible headache, dizziness and balance issues. She went to urgent care and they did an EKG, she was then sent here due to her headache and dizziness.

## 2022-10-24 NOTE — ED PROVIDER NOTES
History   Chief Complaint:  Headache       The history is provided by the patient.      Radha Gomez is a 57 year old female who presents with headache. Earlier this morning, she woke up with extreme dizziness and it worsened when she closed her eyes. Additionally, she developed a headache. She was initially seen at urgent care where she had an EKG done, but was sent to the ED for further assessment. Her dizziness feels better at bedside, but she continues to have  A 9/10 headache. She has tried taking 650 mg of Tylenol for pain management with no relief. She normally has a headache on a daily basis, but her current one feels more severe; though she reports that she has had worse headaches during her life. Also, she has been experiencing intermittent dizziness since 9 months ago and has undergone imaging for it (see below), but has never had a diagnosis. She denies recent head trauma or falls. She denies weakness or numbness in the extremities.     MRI brain with and without contrast 7/18/2022    IMPRESSION:     1. No evidence for acute infarct.   2. Stable moderate chronic microvascular ischemic changes within the cerebral white matter and lakesha.   3. Stable mild volume loss.   4. Stable 1.1 cm x 0.9 cm x 0.9 cm enhancing extra-axial mass along the anterior falx on the left consistent with a meningioma.       Review of Systems   Constitutional: Negative for fever.   Respiratory: Negative for shortness of breath.    Cardiovascular: Negative for chest pain.   Gastrointestinal: Negative for abdominal pain.   Neurological: Positive for dizziness and headaches. Negative for weakness and numbness.   All other systems reviewed and are negative.    Allergies:  Contrast Dye  Abilify [Aripiprazole]  Paxil [Paroxetine]  Ditropan [Oxybutynin Chloride]  Ibuprofen  Wellbutrin [Bupropion]    Medications:  Xanax   Asprin   Buspar   Trulicity   Lasix   Norco   Lamictal   Latuda   Metformin   Methadone   Dolophine   Zofran    Pyridium   Inderal   Seroquel   Effexor   Medical cannabis  Mirabegron   Venlafaxine   Desyrel   Depakote   Xifaxan     Past Medical History:     Major depressive disorder, recurrent episode, severe (H)  Palpitations  Anxiety  PTSD (post-traumatic stress disorder)  Bipolar 1 disorder (H)  Interstitial cystitis  Coronary artery disease  HLD (hyperlipidemia)  Chronic pain -- on Methadone  DM 2 -- Hgb A1C 6.0 on 5/29/19  Night terrors, adult  Disorder of initiating and maintaining sleep  Acute on chronic heart failure with preserved ejection fraction (HFpEF) (H)  Morbid obesity (H)  Nocturnal hypoxemia  Idiopathic right ventricular dilation  Chronic kidney disease, stage 3 (H)  Social anxiety     Past Surgical History:    Bladder biopsy   R heart catheterization   EGD w/ biopsy   Dental extraction   Venereal warts excision   Cholecystectomy   Unspecified orthopedic surgery    Unspecified back surgery     Family History:    Mother - depression, anxiety disorder, substance abuse, dementia, mental illness, CAD, DM   Father - depression, substance abuse, mental illness, heart failure   Sister - suicide, depression, anxiety disorder, COPD, emphysema   Daughter - suicide, depression, anxiety disorder, trichotillomania   Son - bipolar disorder, depression, ODD     Social History:  The patient presents to the ED alone via private vehicle   PCP: Caren Parikh   Hx of alcohol use, tobacco use, and marijuana use     Physical Exam     Patient Vitals for the past 24 hrs:   BP Temp Temp src Pulse Resp SpO2 Weight   10/24/22 1159 -- -- -- -- -- -- 81.6 kg (180 lb)   10/24/22 1158 (!) 155/81 97.4  F (36.3  C) Temporal 61 12 94 % --       Physical Exam  General: Alert and cooperative with exam. Patient in mild distress. Normal mentation.  Head:  Scalp is NC/AT  Eyes:  No scleral icterus, PERRL, EOMI   ENT:  The external nose and ears are normal. The oropharynx is normal and without erythema; mucus membranes are moist. Uvula  midline, no evidence of deep space infection.  Neck:  Normal range of motion without rigidity.  CV:  Regular rate and rhythm    No pathologic murmur   Resp:  Breath sounds are clear bilaterally    Non-labored, no retractions or accessory muscle use  GI:  Abdomen is soft, no distension, no tenderness. No peritoneal signs  MS:  No lower extremity edema     No midline cervical, thoracic, or lumbar tenderness  Skin:  Warm and dry, No rash or lesions noted.  Neuro: Oriented x 3. No gross motor deficits.    Strength and sensation grossly intact in all 4 extremities.      Cranial nerves 2-12 intact.    GCS: 15    Normal finger to nose and heel to shin testing    Gait normal    No nystagmus present    Emergency Department Course   Laboratory:  Labs Ordered and Resulted from Time of ED Arrival to Time of ED Departure   COMPREHENSIVE METABOLIC PANEL - Abnormal       Result Value    Sodium 134      Potassium 4.7      Chloride 105      Carbon Dioxide (CO2) 26      Anion Gap 3      Urea Nitrogen 16      Creatinine 1.04      Calcium 9.2      Glucose 102 (*)     Alkaline Phosphatase 115      AST 33      ALT 76 (*)     Protein Total 7.6      Albumin 3.7      Bilirubin Total 0.2      GFR Estimate 62     CBC WITH PLATELETS AND DIFFERENTIAL    WBC Count 10.1      RBC Count 4.06      Hemoglobin 12.3      Hematocrit 38.6      MCV 95      MCH 30.3      MCHC 31.9      RDW 11.9      Platelet Count 193      % Neutrophils 58      % Lymphocytes 33      % Monocytes 7      % Eosinophils 1      % Basophils 1      % Immature Granulocytes 0      NRBCs per 100 WBC 0      Absolute Neutrophils 5.9      Absolute Lymphocytes 3.3      Absolute Monocytes 0.7      Absolute Eosinophils 0.1      Absolute Basophils 0.1      Absolute Immature Granulocytes 0.0      Absolute NRBCs 0.0        Emergency Department Course:    Reviewed:  I reviewed nursing notes, vitals and past medical history    Assessments:  1339 I obtained history and examined the patient as  noted above.   1548 I rechecked the patient and explained findings. I discussed plan for discharge home.    Interventions:  1211 Antivert 25 mg PO  1403  ml I V  1406 Reglan 10 mg IV   1406 Benadryl 25 mg IV     Disposition:  The patient was discharged to home.     Impression & Plan   Medical Decision Making:  Patient is a 57-year-old female who presents with 9-month history of of intermittent dizziness as well as a headache.  Patient's medical history and records were reviewed.  On evaluation patient notes the dizziness has significantly improved.  Neurologic exam is nonfocal and presentation is not consistent with SAH; there is no indication for advanced head imaging.  Patient notes that she has carbon monoxide detectors at home and no sick contacts.  No clinical evidence of CNS infection.  Basic labs without significant findings.  No emergent cause for patient's headache or intermittent dizziness could be determined.  She was provided medications as noted above with significant improvement.  Recommended close follow-up with PCP for continued evaluation.  Return precautions discussed.  Patient discharged home.    Diagnosis:    ICD-10-CM    1. Dizziness  R42       2. Acute nonintractable headache, unspecified headache type  R51.9           Scribe Disclosure:  I, Bull Lee, am serving as a scribe at 1:37 PM on 10/24/2022 to document services personally performed by Ashish Castellanos DO based on my observations and the provider's statements to me.        Ashish Castellanos,   10/24/22 8441

## 2022-11-27 ENCOUNTER — HEALTH MAINTENANCE LETTER (OUTPATIENT)
Age: 57
End: 2022-11-27

## 2023-01-24 ENCOUNTER — HOSPITAL ENCOUNTER (OUTPATIENT)
Dept: CT IMAGING | Facility: CLINIC | Age: 58
Discharge: HOME | End: 2023-01-24
Attending: INTERNAL MEDICINE | Admitting: INTERNAL MEDICINE
Payer: COMMERCIAL

## 2023-01-24 DIAGNOSIS — R19.7 DIARRHEA, UNSPECIFIED TYPE: ICD-10-CM

## 2023-01-24 DIAGNOSIS — R10.30 LOWER ABDOMINAL PAIN: ICD-10-CM

## 2023-01-24 PROCEDURE — 74176 CT ABD & PELVIS W/O CONTRAST: CPT

## 2023-03-26 ENCOUNTER — HEALTH MAINTENANCE LETTER (OUTPATIENT)
Age: 58
End: 2023-03-26

## 2023-08-03 NOTE — ED NOTES
[facility]   Olanzapine Counseling- I discussed with the patient the common side effects of olanzapine including but are not limited to: lack of energy, dry mouth, increased appetite, sleepiness, tremor, constipation, dizziness, changes in behavior, or restlessness.  Explained that teenagers are more likely to experience headaches, abdominal pain, pain in the arms or legs, tiredness, and sleepiness.  Serious side effects include but are not limited: increased risk of death in elderly patients who are confused, have memory loss, or dementia-related psychosis; hyperglycemia; increased cholesterol and triglycerides; and weight gain.

## 2023-08-20 ENCOUNTER — HEALTH MAINTENANCE LETTER (OUTPATIENT)
Age: 58
End: 2023-08-20

## 2024-01-07 ENCOUNTER — HEALTH MAINTENANCE LETTER (OUTPATIENT)
Age: 59
End: 2024-01-07

## 2024-02-27 ENCOUNTER — TELEPHONE (OUTPATIENT)
Dept: CARDIOLOGY | Facility: CLINIC | Age: 59
End: 2024-02-27
Payer: COMMERCIAL

## 2024-02-27 NOTE — TELEPHONE ENCOUNTER
"Voicemail received from patient saying she was transferred to this line after being told by scheduling that Dr Aguilar has no appointments. She notes she has not been since since 4/2022 and is concerned that she is retaining fluid.     Spoke to patient, says she has not been eating much over the last month due to loss of appetite. She said that she has been actively suicidal over the last month and thinks its related to that. She does have a plan but declined to share what it was. She says she is safe at home and does not have any weapons. She is alone with her pets,  is not there. She communicated with her psychiatrist about 5 hours ago and she states they were considering adjusting her meds vs hospitalization. She says the  were at her house last week because of her SI, says they \"told her how much they care about her\" but she says that she doesn't believe anyone cares about her and that she is all alone. Nursing leadership messaged while patient was still on the line and 911 called for well check. Attempted to keep patient on the line but she hung up, says she doesn't want cardiology dealing with this, wants us to treat her heart.     Called patient back and she answered, she says she \"doesn't want to have to wait 3 mos before she can be seen and end up comatose in the hospital.\" Discussed with patient that we would not make her wait that long to be seen, but that if she is fearful for her life due to her symptoms, then she needs to go to the ED. She is concerned that she is retaining water because her weight is only down 2lbs. When she does eat, its primarily yogurt, not many salty foods. Drinking two 16oz cans of pop per day, if that. Does not drink any water. She denies any shortness of breath, edema, orthopnea, PND. She said she is not taking lasix as she ran out of pills and it was never refilled. She does endorse frequent lightheadedness/dizziness, no obvious trigger per patient. She says her clothes " feel more loose. She is taking all of her other medications as prescribed. No chest pain. She fell off her bed this past weekend when she was trying to take off her pants, says she has been falling often. Discussed that she will need an appointment to be seen.     Nursing manager continued to interface with EMS while this writer was on the phone. Patient refused to speak any longer on the phone and hung up. EMS en route. Nursing leadership updated/aware. Called patient's psychiatrist office to inform them of the situation, spoke to triage RN and they will relay the message to the provider. Will follow along in the chart (admission vs outpatient management?) and arrange cardiology follow up pending EMS/psychiatry follow up.

## 2024-03-01 ENCOUNTER — TELEPHONE (OUTPATIENT)
Dept: CARDIOLOGY | Facility: CLINIC | Age: 59
End: 2024-03-01
Payer: COMMERCIAL

## 2024-03-01 ENCOUNTER — HOSPITAL ENCOUNTER (EMERGENCY)
Facility: CLINIC | Age: 59
Discharge: HOME | End: 2024-03-01
Attending: EMERGENCY MEDICINE | Admitting: EMERGENCY MEDICINE
Payer: COMMERCIAL

## 2024-03-01 VITALS
DIASTOLIC BLOOD PRESSURE: 64 MMHG | TEMPERATURE: 98.7 F | WEIGHT: 208 LBS | OXYGEN SATURATION: 91 % | SYSTOLIC BLOOD PRESSURE: 107 MMHG | RESPIRATION RATE: 18 BRPM | BODY MASS INDEX: 36.86 KG/M2 | HEIGHT: 63 IN | HEART RATE: 73 BPM

## 2024-03-01 DIAGNOSIS — Z71.1 WORRIED WELL: ICD-10-CM

## 2024-03-01 LAB
ALBUMIN SERPL BCG-MCNC: 4.2 G/DL (ref 3.5–5.2)
ALP SERPL-CCNC: 88 U/L (ref 40–150)
ALT SERPL W P-5'-P-CCNC: 16 U/L (ref 0–50)
ANION GAP SERPL CALCULATED.3IONS-SCNC: 12 MMOL/L (ref 7–15)
AST SERPL W P-5'-P-CCNC: 21 U/L (ref 0–45)
ATRIAL RATE - MUSE: 68 BPM
BASOPHILS # BLD AUTO: 0 10E3/UL (ref 0–0.2)
BASOPHILS NFR BLD AUTO: 0 %
BILIRUB SERPL-MCNC: 0.2 MG/DL
BUN SERPL-MCNC: 25.4 MG/DL (ref 8–23)
CALCIUM SERPL-MCNC: 8.7 MG/DL (ref 8.6–10)
CHLORIDE SERPL-SCNC: 104 MMOL/L (ref 98–107)
CREAT SERPL-MCNC: 0.93 MG/DL (ref 0.51–0.95)
DEPRECATED HCO3 PLAS-SCNC: 24 MMOL/L (ref 22–29)
DIASTOLIC BLOOD PRESSURE - MUSE: NORMAL MMHG
EGFRCR SERPLBLD CKD-EPI 2021: 70 ML/MIN/1.73M2
EOSINOPHIL # BLD AUTO: 0.2 10E3/UL (ref 0–0.7)
EOSINOPHIL NFR BLD AUTO: 2 %
ERYTHROCYTE [DISTWIDTH] IN BLOOD BY AUTOMATED COUNT: 12.9 % (ref 10–15)
GLUCOSE SERPL-MCNC: 108 MG/DL (ref 70–99)
HCT VFR BLD AUTO: 36 % (ref 35–47)
HGB BLD-MCNC: 11.9 G/DL (ref 11.7–15.7)
IMM GRANULOCYTES # BLD: 0.1 10E3/UL
IMM GRANULOCYTES NFR BLD: 1 %
INTERPRETATION ECG - MUSE: NORMAL
LYMPHOCYTES # BLD AUTO: 2.6 10E3/UL (ref 0.8–5.3)
LYMPHOCYTES NFR BLD AUTO: 25 %
MCH RBC QN AUTO: 31.5 PG (ref 26.5–33)
MCHC RBC AUTO-ENTMCNC: 33.1 G/DL (ref 31.5–36.5)
MCV RBC AUTO: 95 FL (ref 78–100)
MONOCYTES # BLD AUTO: 0.7 10E3/UL (ref 0–1.3)
MONOCYTES NFR BLD AUTO: 7 %
NEUTROPHILS # BLD AUTO: 6.6 10E3/UL (ref 1.6–8.3)
NEUTROPHILS NFR BLD AUTO: 65 %
NRBC # BLD AUTO: 0 10E3/UL
NRBC BLD AUTO-RTO: 0 /100
NT-PROBNP SERPL-MCNC: 452 PG/ML (ref 0–900)
P AXIS - MUSE: 65 DEGREES
PLATELET # BLD AUTO: 277 10E3/UL (ref 150–450)
POTASSIUM SERPL-SCNC: 4.4 MMOL/L (ref 3.4–5.3)
PR INTERVAL - MUSE: 148 MS
PROT SERPL-MCNC: 7.1 G/DL (ref 6.4–8.3)
QRS DURATION - MUSE: 88 MS
QT - MUSE: 408 MS
QTC - MUSE: 433 MS
R AXIS - MUSE: 14 DEGREES
RBC # BLD AUTO: 3.78 10E6/UL (ref 3.8–5.2)
SODIUM SERPL-SCNC: 140 MMOL/L (ref 135–145)
SYSTOLIC BLOOD PRESSURE - MUSE: NORMAL MMHG
T AXIS - MUSE: 65 DEGREES
VENTRICULAR RATE- MUSE: 68 BPM
WBC # BLD AUTO: 10.2 10E3/UL (ref 4–11)

## 2024-03-01 PROCEDURE — 83880 ASSAY OF NATRIURETIC PEPTIDE: CPT | Performed by: EMERGENCY MEDICINE

## 2024-03-01 PROCEDURE — 85025 COMPLETE CBC W/AUTO DIFF WBC: CPT | Performed by: EMERGENCY MEDICINE

## 2024-03-01 PROCEDURE — 99284 EMERGENCY DEPT VISIT MOD MDM: CPT

## 2024-03-01 PROCEDURE — 80053 COMPREHEN METABOLIC PANEL: CPT | Performed by: EMERGENCY MEDICINE

## 2024-03-01 PROCEDURE — 36415 COLL VENOUS BLD VENIPUNCTURE: CPT | Performed by: EMERGENCY MEDICINE

## 2024-03-01 PROCEDURE — 93005 ELECTROCARDIOGRAM TRACING: CPT

## 2024-03-01 ASSESSMENT — ACTIVITIES OF DAILY LIVING (ADL)
ADLS_ACUITY_SCORE: 37
ADLS_ACUITY_SCORE: 35
ADLS_ACUITY_SCORE: 37
ADLS_ACUITY_SCORE: 37

## 2024-03-01 ASSESSMENT — COLUMBIA-SUICIDE SEVERITY RATING SCALE - C-SSRS
2. HAVE YOU ACTUALLY HAD ANY THOUGHTS OF KILLING YOURSELF IN THE PAST MONTH?: YES
6. HAVE YOU EVER DONE ANYTHING, STARTED TO DO ANYTHING, OR PREPARED TO DO ANYTHING TO END YOUR LIFE?: YES
BASED ON RESPONSES TO C-SSRS QS 1-6, WHAT IS THE PATIENT'S OVERALL RISK RATING FOR SUICIDE: MODERATE RISK
3. HAVE YOU BEEN THINKING ABOUT HOW YOU MIGHT KILL YOURSELF?: NO
5. HAVE YOU STARTED TO WORK OUT OR WORKED OUT THE DETAILS OF HOW TO KILL YOURSELF? DO YOU INTEND TO CARRY OUT THIS PLAN?: NO
4. HAVE YOU HAD THESE THOUGHTS AND HAD SOME INTENTION OF ACTING ON THEM?: NO
1. IN THE PAST MONTH, HAVE YOU WISHED YOU WERE DEAD OR WISHED YOU COULD GO TO SLEEP AND NOT WAKE UP?: YES

## 2024-03-01 NOTE — TELEPHONE ENCOUNTER
10:30 AM Call from patient wanting to know why she hasn't been called to set up an appointment. Reviewed with patient that the scheduling team has been working on this and will ask the supervisor to give her a call. Verified her call back # as [phone].  Message sent to nursing supervisor    11:00 reply from supervisor: scheduling has been trying to reach her since 2/28 to set up a visit but kept getting a busy signal/no option to leave a message.  They will reach out again this AM.  Update: still unable to get through to patient's phone - rapid busy signal.

## 2024-03-01 NOTE — ED NOTES
Pt left without receive paper work or talking with provider about results     Pt states she will follow up with her PCP on Monday about blood work results

## 2024-03-01 NOTE — LETTER
March 4, 2024       TO: Radha Gomez  [address]  [place] [address]       Dear Radha Gomez,    We have been trying to reach you to set up a cardiology visit however we keep getting a busy signal on your phone and cannot reach you/leave a message.     Please call [phone] to schedule your appointment with any available provider.    Thank you for allowing [facility] to be a part of your health care team and we look forward to seeing you soon.    Thank you,    [facility]

## 2024-03-01 NOTE — ED TRIAGE NOTES
Pt states that she is here for fluid retention;  does not note any swelling however states that she did not the last time either.  She says she has had a loss of appetite and is only eating yogurt but has not lost any weight.

## 2024-03-01 NOTE — ED PROVIDER NOTES
"  History     Chief Complaint:  Generalized Weakness       The history is provided by the patient and the spouse.      Radha Gomez is a 58 year old female with history of coronary artery disease, diabetes mellitus type 2 and chronic kidney disease  who presents to the ED for concern for retaining fluid. The patient reports she had an episode in 2017 where she was experiencing difficulty breathing and \"heart, kidney, and lungs shut down\". She states she has been on lasix since then but recently went off of it because they thought she did not need it anymore. She reports she has been suicidal, not eating, and drinking 2 beers a day but has not lost any weight so she is now concerned that she is retaining water. She states she has not been able to get a cardiologist appointment soon and was recommended to come here today due to her symptoms. She notes she has not seen a cardiologist in 2-3 years and she was taken off of Lasix but thinks she needs to be back on it. Spouse reports the patient started Zoloft yesterday switching from Effexor.  Her mental health has been a problem recently including more suicidal ideation earlier this week but is not actively suicidal at this time and does not have concerns about her mental health.  Her  states that he does not have any acute concerns about her mental health or her safety.  She emphasizes she does not want to be seen today for mental health because she has a therapist and a psychiatrist she sees.  She saw her psychiatrist the other day for the symptoms.  She adds that if she accumulates more medical bills she will feel like a burden. She denies experiencing trouble breathing, leg swelling, cough, chest pain, or fever. She notes her mother  of congestive heart failure.     Independent Historian:   Spouse/Partner - They report supplemental history.     Review of External Notes:   Psychiatry note by Dr. John Marcelo dated 2024 for her mental health and " "increased suicidality.      Medications:    Hydrocodone-acetaminophen   Phenazopyridine  Divalproex  Gabapentin  Trazodone  Propranolol  Lorazepam   Venlafaxine  Sertraline  Asenapine maleate     Past Medical History:    Anxiety  Arthritis  Bipolar 1 disorder   Coronary artery disease  Osteoarthritis  PTSD (post-traumatic stress disorder)  Major depressive disorder, recurrent episode, severe   Anxiety  Interstitial cystitis  HLD (hyperlipidemia)  Drug-induced diabetes mellitus   Night terrors, adult  Disorder of initiating and maintaining sleep  Acute on chronic heart failure with preserved ejection fraction   Morbid obesity   Nocturnal hypoxemia  Idiopathic right ventricular dilation  Chronic kidney disease, stage 3   Social anxiety disorder   Benign essential tremor   Chronic diarrhea   Methadone use   Chronic low back pain   Lumbar radiculopathy   Diabetes mellitus type II, uncontrolled   Golfer's elbow   Lateral epicondylitis   Osteoarthritis   Gastroesophageal reflux disease   Facet Syndrome     Past Surgical History:    Cv right heart cath measurements recorded   Incision and drainage mandible, combined   Extraction(s) dental   Hc left heart catheterization   Laparoscopic cholecystectomy   Esophagoscopy, gastroscopy, duodenoscopy (egd), combined   Laparoscopies  Biopsy Bladder  Colonoscopy  Gyn surgery   Orthopedic surgery      Physical Exam   Patient Vitals for the past 24 hrs:   BP Temp Temp src Pulse Resp SpO2 Height Weight   03/01/24 1333 107/64 -- -- 73 -- 91 % -- --   03/01/24 1332 107/64 -- -- 74 18 92 % -- --   03/01/24 1236 117/51 98.7  F (37.1  C) Oral 89 16 95 % 1.6 m (5' 3\") 94.3 kg (208 lb)        Physical Exam  Constitutional: Well appearing.  HEENT: Atraumatic. Moist mucous membranes.  Neck: Soft.  Supple.   Cardiac: Regular rate and rhythm.  No murmur or rub.  Respiratory: Clear to auscultation bilaterally.  No respiratory distress.  No wheezing, rhonchi, or rales.  Abdomen: Soft and nontender. "  No rebound or guarding.  Nondistended.  Musculoskeletal: No edema.  Normal range of motion.  Neurologic: Alert and oriented x3.  Normal tone and bulk.  Skin: No rashes.  No edema.  Psych: Normal affect.  Normal behavior.  No active suicidal ideation.  No suicidal intent.  No odd or manic behavior.  No pressured or tangential speech.            Emergency Department Course   ECG  ECG results from 03/01/24   EKG 12-lead, tracing only     Value    Systolic Blood Pressure     Diastolic Blood Pressure     Ventricular Rate 68    Atrial Rate 68    DE Interval 148    QRS Duration 88        QTc 433    P Axis 65    R AXIS 14    T Axis 65    Interpretation ECG      Sinus rhythm  Low voltage QRS  Borderline ECG  When compared with ECG of 24-OCT-2022 11:59,  Sinus rhythm has replaced Junctional rhythm  Confirmed by GENERATED REPORT, COMPUTER ([id number]),  María Butler ([id number]) on 3/1/2024 3:24:51 PM  ECG taken at 1423     Laboratory:  Labs Ordered and Resulted from Time of ED Arrival to Time of ED Departure   COMPREHENSIVE METABOLIC PANEL - Abnormal       Result Value    Sodium 140      Potassium 4.4      Carbon Dioxide (CO2) 24      Anion Gap 12      Urea Nitrogen 25.4 (*)     Creatinine 0.93      GFR Estimate 70      Calcium 8.7      Chloride 104      Glucose 108 (*)     Alkaline Phosphatase 88      AST 21      ALT 16      Protein Total 7.1      Albumin 4.2      Bilirubin Total 0.2     CBC WITH PLATELETS AND DIFFERENTIAL - Abnormal    WBC Count 10.2      RBC Count 3.78 (*)     Hemoglobin 11.9      Hematocrit 36.0      MCV 95      MCH 31.5      MCHC 33.1      RDW 12.9      Platelet Count 277      % Neutrophils 65      % Lymphocytes 25      % Monocytes 7      % Eosinophils 2      % Basophils 0      % Immature Granulocytes 1      NRBCs per 100 WBC 0      Absolute Neutrophils 6.6      Absolute Lymphocytes 2.6      Absolute Monocytes 0.7      Absolute Eosinophils 0.2      Absolute Basophils 0.0      Absolute Immature  Granulocytes 0.1      Absolute NRBCs 0.0     NT PROBNP INPATIENT - Normal    N terminal Pro BNP Inpatient 452        Emergency Department Course & Assessments:    Interventions:  Medications - No data to display       Independent Interpretation (X-rays, CTs, rhythm strip):  None    Assessments/Consultations/Discussion of Management or Tests:  ED Course as of 03/01/24 1650   Fri Mar 01, 2024   1411 I obtained history and examined the patient as noted above.        Social Determinants of Health affecting care:   None    Disposition:  Patient eloped.     Impression & Plan    Medical Decision Making:  Radha Gomez is a 58-year-old woman who is afebrile and hemodynamically stable.  She is in no respiratory distress with no signs of volume overload on exam.  Blood workup is unrevealing.  She has not been on diuretic for multiple years.  She has no symptoms of CHF such as edema, shortness of breath, or weight gain.  She is mainly concerned she is not losing weight as she is not eating or drinking much due to her mental health.  I see no indication to start her on Lasix at this time.  She has been having increased mental health concerns and saw her primary care psychiatrist the other day to address the symptoms.  She is not actively suicidal with no intent and  does not have any acute concerns.  I do note her suicide risk screening.  I do not believe she is holdable at this time and she does not want a DEC evaluation.  She does not appear in imminent danger to herself or others at this time and I do not think I can hold her against her well to have a DEC evaluation.  I went to speak with her about her results and she had eloped and told the nursing staff that she was leaving.  She states she is can follow-up with her primary care physician.  She was hesitant to do blood work from the onset and was thinking about just leaving prior to my evaluation according to nursing staff.  She was of sound mind and will  follow-up with her primary care physician.      Diagnosis:    ICD-10-CM    1. Worried well  Z71.1            Discharge Medications:  Discharge Medication List as of 3/1/2024  4:20 PM             Scribe Disclosure:  I, Mary Souza, am serving as a scribe at 2:26 PM on 3/1/2024 to document services personally performed by Yared Munguia MD based on my observations and the provider's statements to me.     3/1/2024   Yared Munguia MD Salay, Nicholas J, MD  03/04/24 0948

## 2024-03-04 NOTE — TELEPHONE ENCOUNTER
Multiple attempts made by scheduling to contact patient however no answer/persistent busy signal, no option to leave a message. Letter sent to call for an appointment.

## 2024-03-19 ENCOUNTER — HOSPITAL ENCOUNTER (OUTPATIENT)
Facility: CLINIC | Age: 59
Discharge: HOME | End: 2024-03-19
Attending: INTERNAL MEDICINE
Payer: COMMERCIAL

## 2024-03-19 ASSESSMENT — ACTIVITIES OF DAILY LIVING (ADL)
ADLS_ACUITY_SCORE: 37

## 2024-03-20 ASSESSMENT — ACTIVITIES OF DAILY LIVING (ADL)
ADLS_ACUITY_SCORE: 37

## 2024-03-21 ASSESSMENT — ACTIVITIES OF DAILY LIVING (ADL)
ADLS_ACUITY_SCORE: 37

## 2024-03-22 ASSESSMENT — ACTIVITIES OF DAILY LIVING (ADL)
ADLS_ACUITY_SCORE: 37

## 2024-03-23 ASSESSMENT — ACTIVITIES OF DAILY LIVING (ADL)
ADLS_ACUITY_SCORE: 37

## 2024-03-24 ASSESSMENT — ACTIVITIES OF DAILY LIVING (ADL)
ADLS_ACUITY_SCORE: 37

## 2024-05-26 ENCOUNTER — HEALTH MAINTENANCE LETTER (OUTPATIENT)
Age: 59
End: 2024-05-26

## 2024-10-13 ENCOUNTER — HEALTH MAINTENANCE LETTER (OUTPATIENT)
Age: 59
End: 2024-10-13

## 2025-01-25 ENCOUNTER — HEALTH MAINTENANCE LETTER (OUTPATIENT)
Age: 60
End: 2025-01-25

## 2025-05-03 ENCOUNTER — HEALTH MAINTENANCE LETTER (OUTPATIENT)
Age: 60
End: 2025-05-03

## 2025-06-14 ENCOUNTER — HEALTH MAINTENANCE LETTER (OUTPATIENT)
Age: 60
End: 2025-06-14

## 2025-08-16 ENCOUNTER — HEALTH MAINTENANCE LETTER (OUTPATIENT)
Age: 60
End: 2025-08-16

## (undated) DEVICE — ESU ELEC BLADE 2.75" COATED/INSULATED E1455

## (undated) DEVICE — SOL NACL 0.9% IRRIG 1000ML BOTTLE 2F7124

## (undated) DEVICE — DRAIN JACKSON PRATT RESERVOIR 100ML SU130-1305

## (undated) DEVICE — GLOVE PROTEXIS BLUE W/NEU-THERA 8.0  2D73EB80

## (undated) DEVICE — LINEN TOWEL PACK X5 5464

## (undated) DEVICE — DRSG STERI STRIP 1/4X3" R1541

## (undated) DEVICE — GLOVE PROTEXIS MICRO 7.0  2D73PM70

## (undated) DEVICE — CATH ANGIO INFINITI JR4 4FRX100CM 538421

## (undated) DEVICE — SU CHROMIC 3-0 PS-2 27" 1638H

## (undated) DEVICE — INTRO GLIDESHEATH SLENDER 6FR 10X45CM 60-1060

## (undated) DEVICE — GOWN LG DISP 9515

## (undated) DEVICE — NDL 27GA 1.25" 305136

## (undated) DEVICE — KIT SURGICAL TURNOVER FVSD-01D

## (undated) DEVICE — TOTE ANGIO CORP PC15AT SAN32CC83O

## (undated) DEVICE — GLOVE PROTEXIS W/NEU-THERA 8.0  2D73TE80

## (undated) DEVICE — ESU ELEC NDL 1" E1552

## (undated) DEVICE — DRAPE STERI TOWEL LG 1010

## (undated) DEVICE — DRAIN PENROSE 0.25"X18" LATEX FREE GR201

## (undated) DEVICE — DECANTER VIAL 2006S

## (undated) DEVICE — MEDITRACE MULTIFUNTION ADULT RADIOTRANSPARENT ELECTRODE FOR ZOLL

## (undated) DEVICE — KIT HAND CONTROL ANGIOTOUCH ACIST 65CM AT-P65

## (undated) DEVICE — Device

## (undated) DEVICE — SPONGE PACK VAGINAL 2X36"

## (undated) DEVICE — INTRODUCER CATH VASC 5FRX10CM  MPIS-501-NT-U-SST

## (undated) DEVICE — BUR DENTAL 1.75X75MM STRAIGHT 560

## (undated) DEVICE — PEN MARKING SKIN

## (undated) DEVICE — INTRO SHEATH 7FRX10CM PINNACLE RSS702

## (undated) DEVICE — PACK HEAD NECK SEN15HNFSF

## (undated) DEVICE — CATH ANGIO INFINITI JL4 4FRX100CM 538420

## (undated) DEVICE — SOL WATER IRRIG 1000ML BOTTLE 2F7114

## (undated) DEVICE — BUR SIDE CUT 51MM CARBIDE 5091-217

## (undated) DEVICE — ESU GROUND PAD UNIVERSAL W/O CORD

## (undated) DEVICE — GLOVE PROTEXIS MICRO 8.0  2D73PM80

## (undated) DEVICE — MANIFOLD KIT ANGIO AUTOMATED 014613

## (undated) DEVICE — SYR ANGIOGRAPHY MULTIUSE KIT ACIST 014612

## (undated) DEVICE — SUCTION CANISTER MEDIVAC LINER 3000ML W/LID 65651-530

## (undated) DEVICE — BLADE KNIFE SURG 15 371115

## (undated) DEVICE — DRSG GAUZE 4X4" 3033

## (undated) RX ORDER — DIAZEPAM 5 MG
TABLET ORAL
Status: DISPENSED
Start: 2020-08-11

## (undated) RX ORDER — HEPARIN SODIUM 1000 [USP'U]/ML
INJECTION, SOLUTION INTRAVENOUS; SUBCUTANEOUS
Status: DISPENSED
Start: 2021-01-28

## (undated) RX ORDER — PROPOFOL 10 MG/ML
INJECTION, EMULSION INTRAVENOUS
Status: DISPENSED
Start: 2019-08-30

## (undated) RX ORDER — HYDROMORPHONE HYDROCHLORIDE 1 MG/ML
INJECTION, SOLUTION INTRAMUSCULAR; INTRAVENOUS; SUBCUTANEOUS
Status: DISPENSED
Start: 2019-08-30

## (undated) RX ORDER — FENTANYL CITRATE 50 UG/ML
INJECTION, SOLUTION INTRAMUSCULAR; INTRAVENOUS
Status: DISPENSED
Start: 2019-08-30

## (undated) RX ORDER — LIDOCAINE HYDROCHLORIDE AND EPINEPHRINE BITARTRATE 20; .01 MG/ML; MG/ML
INJECTION, SOLUTION SUBCUTANEOUS
Status: DISPENSED
Start: 2019-08-30

## (undated) RX ORDER — CHLORHEXIDINE GLUCONATE ORAL RINSE 1.2 MG/ML
SOLUTION DENTAL
Status: DISPENSED
Start: 2019-08-30

## (undated) RX ORDER — FENTANYL CITRATE 50 UG/ML
INJECTION, SOLUTION INTRAMUSCULAR; INTRAVENOUS
Status: DISPENSED
Start: 2021-01-28

## (undated) RX ORDER — HEPARIN SODIUM 200 [USP'U]/100ML
INJECTION, SOLUTION INTRAVENOUS
Status: DISPENSED
Start: 2021-01-28

## (undated) RX ORDER — LIDOCAINE HYDROCHLORIDE 10 MG/ML
INJECTION, SOLUTION EPIDURAL; INFILTRATION; INTRACAUDAL; PERINEURAL
Status: DISPENSED
Start: 2021-01-28